# Patient Record
Sex: FEMALE | Race: WHITE | NOT HISPANIC OR LATINO | Employment: FULL TIME | URBAN - METROPOLITAN AREA
[De-identification: names, ages, dates, MRNs, and addresses within clinical notes are randomized per-mention and may not be internally consistent; named-entity substitution may affect disease eponyms.]

---

## 2018-12-03 ENCOUNTER — OFFICE VISIT (OUTPATIENT)
Dept: OBGYN | Facility: CLINIC | Age: 28
End: 2018-12-03
Payer: COMMERCIAL

## 2018-12-03 VITALS
DIASTOLIC BLOOD PRESSURE: 90 MMHG | HEIGHT: 64 IN | BODY MASS INDEX: 29.53 KG/M2 | TEMPERATURE: 98.4 F | SYSTOLIC BLOOD PRESSURE: 130 MMHG | WEIGHT: 173 LBS | HEART RATE: 101 BPM

## 2018-12-03 DIAGNOSIS — R03.0 ELEVATED BLOOD PRESSURE READING WITHOUT DIAGNOSIS OF HYPERTENSION: ICD-10-CM

## 2018-12-03 DIAGNOSIS — Z01.419 ENCOUNTER FOR GYNECOLOGICAL EXAMINATION WITHOUT ABNORMAL FINDING: Primary | ICD-10-CM

## 2018-12-03 DIAGNOSIS — Z12.4 SCREENING FOR MALIGNANT NEOPLASM OF CERVIX: ICD-10-CM

## 2018-12-03 PROCEDURE — 87491 CHLMYD TRACH DNA AMP PROBE: CPT | Performed by: OBSTETRICS & GYNECOLOGY

## 2018-12-03 PROCEDURE — G0145 SCR C/V CYTO,THINLAYER,RESCR: HCPCS | Performed by: OBSTETRICS & GYNECOLOGY

## 2018-12-03 PROCEDURE — 87591 N.GONORRHOEAE DNA AMP PROB: CPT | Performed by: OBSTETRICS & GYNECOLOGY

## 2018-12-03 PROCEDURE — 99385 PREV VISIT NEW AGE 18-39: CPT | Performed by: OBSTETRICS & GYNECOLOGY

## 2018-12-03 NOTE — PROGRESS NOTES
OB GYN CLINIC VISIT  12/3/2018  CC: annual exam    HPI:  Chanel is a 28 year old  female who presents for annual exam.     Menses are regular q 28-30 days and normal lasting 4 days.  Menses flow: normal and light.  Patient's last menstrual period was 2018.. Using condoms/withdrawal for contraception. Would be okay with pregnancy at this time. She is currently considering pregnancy. Plans to start trying in summer.   Besides routine health maintenance, she has no other health concerns today.  She and her  are trying to lose weight.  Started meal planning and have a plan to increase activity and exercise.    Menses regular. Had IUD 0074-5217. Tried OCPs.     GYNECOLOGIC HISTORY:  Menarche: 16  Age at first intercourse: 18 Number of lifetime partners: more than 6  Chanel is sexually active with 1male partner(s) and is currently in monogamous relationship with .    History sexually transmitted infections:No STD history  STI testing offered?  Accepted  JOSE JUAN exposure: No  History of abnormal Pap smear: NO - age 21-29 PAP every 3 years recommended  Family history of breast CA: No  Family history of uterine/ovarian CA: No  Family history of colon CA: No    HEALTH MAINTENANCE:  Cholesterol: (No results found for: CHOL History of abnormal lipids: No  Mammo: 0 . History of abnormal Mammo: Not applicable, 0.  Regular Self Breast Exams: No  Calcium/Vitamin D intake: source:   Adequate? Yes  TSH: (No results found for: TSH )  Pap; (No results found for: PAP )    HISTORY:  Obstetric History       T0      L0     SAB0   TAB0   Ectopic0   Multiple0   Live Births0         No past medical history on file.  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  Social History     Marital status:      Spouse name: N/A     Number of children: N/A     Years of education: N/A     Social History Main Topics     Smoking status: Former Smoker     Types: Cigarettes     Smokeless tobacco: Never Used     Alcohol use Yes     Drug use: Yes     Sexual activity: Yes     Partners: Male     Other Topics Concern     None     Social History Narrative     None     No current outpatient prescriptions on file.   No Known Allergies    Past medical, surgical, social and family history were reviewed and updated in EPIC.    ROS:   C:     NEGATIVE for fever, chills, change in weight  I:       NEGATIVE for worrisome rashes, moles or lesions  E:     NEGATIVE for vision changes or irritation  E/M: NEGATIVE for ear, mouth and throat problems  R:     NEGATIVE for significant cough or SOB  CV:   NEGATIVE for chest pain, palpitations or peripheral edema  GI:     NEGATIVE for nausea, abdominal pain, heartburn, or change in bowel habits  :   NEGATIVE for frequency, dysuria, hematuria, vaginal discharge, or irregular bleeding  M:     NEGATIVE for significant arthralgias or myalgia  N:      NEGATIVE for weakness, dizziness or paresthesias  E:      NEGATIVE for temperature intolerance, skin/hair changes  P:      NEGATIVE for changes in mood or affect.    EXAM:  /90 (BP Location: Left arm, Patient Position: Sitting, Cuff Size: Adult Regular)  Pulse 101  Temp 98.4  F (36.9  C) (Oral)  Ht 5' 4\" (1.626 m)  Wt 173 lb (78.5 kg)  LMP 11/12/2018  BMI 29.7 kg/m2   BMI: Body mass index is 29.7 kg/(m^2).  Constitutional: healthy, alert and no distress  Head: Normocephalic. No masses, lesions, tenderness or abnormalities  Neck: Neck supple. Trachea midline. " No adenopathy. Thyroid symmetric, normal size.   Cardiovascular: RRR.   Respiratory: Negative.   Breast: No nodularity, asymmetry or nipple discharge bilaterally.  Gastrointestinal: Abdomen soft, non-tender, non-distended. No masses, organomegaly.  :  Vulva:  No external lesions, normal female hair distribution, no inguinal adenopathy.    Urethra:  Midline, non-tender, well supported, no discharge  Vagina:  Moist, pink, no abnormal discharge, no lesions  Uterus:  Normal size, non-tender, freely mobile  Ovaries:  No masses appreciated, non-tender, mobile  Rectal Exam: deferred  Musculoskeletal: extremities normal  Skin: raised 5mm dark mole on top of left foot, no suspicious lesions or rashes  Psychiatric: Affect appropriate, cooperative,mentation appears normal.     COUNSELING:   Reviewed preventive health counseling, as reflected in patient instructions       Regular exercise       Healthy diet/nutrition       Contraception       Family planning       Safe sex practices/STD prevention   reports that she has quit smoking. Her smoking use included Cigarettes. She has never used smokeless tobacco.    Body mass index is 29.7 kg/(m^2).  Weight management plan: Discussed healthy diet and exercise guidelines  FRAX Risk Assessment    ASSESSMENT:  28 year old female with satisfactory annual exam    1. Encounter for gynecological examination without abnormal finding  Recommend patient start taking prenatal vitamins prior to trying to conceive  - Pap imaged thin layer screen reflex to HPV if ASCUS - recommend age 25 - 29  - NEISSERIA GONORRHOEA PCR  - CHLAMYDIA TRACHOMATIS PCR    2. Screening for malignant neoplasm of cervix  - Pap imaged thin layer screen reflex to HPV if ASCUS - recommend age 25 - 29    3. Elevated blood pressure reading without diagnosis of hypertension  Patient plans to work on diet and increase exercise.  She will check her blood pressure at work.      Brenda Hooks MD

## 2018-12-03 NOTE — MR AVS SNAPSHOT
"              After Visit Summary   12/3/2018    Chanel Vázquez    MRN: [id number]           Patient Information     Date Of Birth          1990        Visit Information        Provider Department      12/3/2018 12:30 PM Brenda Hooks MD [facility] [place]        Today's Diagnoses     Encounter for gynecological examination without abnormal finding    -  1    Screening for malignant neoplasm of cervix        Elevated blood pressure reading without diagnosis of hypertension           Follow-ups after your visit        Who to contact     If you have questions or need follow up information about today's clinic visit or your schedule please contact [facility] [place] at [phone].  Normal or non-critical lab and imaging results will be communicated to you by [employer], letter or phone within 4 business days after the clinic has received the results. If you do not hear from us within 7 days, please contact the clinic through [employer] or phone. If you have a critical or abnormal lab result, we will notify you by phone as soon as possible.  Submit refill requests through [employer] or call your pharmacy and they will forward the refill request to us. Please allow 3 business days for your refill to be completed.          Additional Information About Your Visit        MyChart Information     [employer] lets you send messages to your doctor, view your test results, renew your prescriptions, schedule appointments and more. To sign up, go to [email or web address] . Click on \"Log in\" on the left side of the screen, which will take you to the Welcome page. Then click on \"Sign up Now\" on the right side of the page.     You will be asked to enter the access code listed below, as well as some personal information. Please follow the directions to create your username and password.     Your access code is: [email or web address]  Expires: 3/3/2019  2:27 PM     Your access code will  in 90 days. If you " "need help or a new code, please call your [facility] clinic or [phone].        Care EveryWhere ID     This is your Care EveryWhere ID. This could be used by other organizations to access your [facility] medical records  [id number]        Your Vitals Were     Pulse Temperature Height Last Period BMI (Body Mass Index)       101 98.4  F (36.9  C) (Oral) 5' 4\" (1.626 m) 11/12/2018 29.7 kg/m2        Blood Pressure from Last 3 Encounters:   12/03/18 130/90    Weight from Last 3 Encounters:   12/03/18 173 lb (78.5 kg)   07/29/02 78 lb (35.4 kg) (56 %)*     * Growth percentiles are based on [facility] 2-20 Years data.              We Performed the Following     CHLAMYDIA TRACHOMATIS PCR     NEISSERIA GONORRHOEA PCR     Pap imaged thin layer screen reflex to HPV if ASCUS - recommend age 25 - 29        Primary Care Provider Fax #    Physician No Ref-Primary [phone]       No address on file        Equal Access to Services     ELIJAH MEREDITH : Hadii aad ku hadasho Soomaali, waaxda luqadaha, qaybta kaalmada adeegyada, hanny gaviria . So [facility] [phone].    ATENCIÓN: Si habla español, tiene a longoria disposición servicios gratuitos de asistencia lingüística. Llame al [phone].    We comply with applicable federal civil rights laws and [place] laws. We do not discriminate on the basis of race, color, national origin, age, disability, sex, sexual orientation, or gender identity.            Thank you!     Thank you for choosing [facility]  [place] your [place]. Our goal is always to provide you with excellent care. Hearing back from our patients is one way we can continue to improve our services. Please take a few minutes to complete the written survey that you may receive in the mail after your visit with us. Thank you!             Your Updated Medication List - Protect others around you: Learn how to safely use, store and throw away your medicines at www.disposemymeds.org.      Notice  As of " 12/3/2018  2:27 PM    You have not been prescribed any medications.

## 2018-12-03 NOTE — LETTER
December 6, 2018      Chanel Vázquez  [address] [place] [address]        Dear ,    We are writing to inform you of your test results.    Your test results fall within the expected range(s) or remain unchanged from previous results.  Please continue with current treatment plan.    Resulted Orders   Pap imaged thin layer screen reflex to HPV if ASCUS - recommend age 25 - 29   Result Value Ref Range    PAP NIL     Copath Report         Patient Name: CHANEL VÁZQUEZ  MR#: [id number]  Specimen #: [id number]  Collected: 12/3/2018  Received: 12/4/2018  Reported: 12/5/2018 10:32  Ordering Phy(s): NANY SMITH    For improved result formatting, select 'View Enhanced Report Format' under   Linked Documents section.    SPECIMEN/STAIN PROCESS:  Pap imaged thin layer prep screening (Surepath, FocalPoint with guided   screening)       Pap-Cyto x 1, Pap with reflex to HPV if ASCUS x 1    SOURCE: Cervical, endocervical  ----------------------------------------------------------------   Pap imaged thin layer prep screening (Surepath, FocalPoint with guided   screening)  SPECIMEN ADEQUACY:  Satisfactory for evaluation.  -Transformation zone component present.    CYTOLOGIC INTERPRETATION:    Negative for intraepithelial lesion or malignancy    Electronically signed out by:  SHIRA Luu  (ASCP)    Processed and screened at [facility]    CLINICAL HISTORY:  LMP : 11/12/2018    Papanicolaou Test Limitations:  Cervical cytology is a screening test with   limited sensitivity; regular  screening is critical for cancer prevention; Pap tests are primarily   effective for the diagnosis/prevention of  squamous cell carcinoma, not adenocarcinomas or other cancers.    TESTING LAB LOCATION:  [address]  [phone]    COLLECTION SITE:  Client:  [facility],  [place]  Location: [facility] (B)     NEISSERIA GONORRHOEA PCR   Result Value Ref Range    Specimen Descrip Cervix     N Gonorrhea PCR Negative NEG^Negative      Comment:      Negative for N. gonorrhoeae rRNA by transcription mediated amplification.  A negative result by transcription mediated amplification does not preclude   the presence of N. gonorrhoeae infection because results are dependent on   proper and adequate collection, absence of inhibitors, and sufficient rRNA to   be detected.     CHLAMYDIA TRACHOMATIS PCR   Result Value Ref Range    Specimen Description Cervix     Chlamydia Trachomatis PCR Negative NEG^Negative      Comment:      Negative for C. trachomatis rRNA by transcription mediated amplification.  A negative result by transcription mediated amplification does not preclude   the presence of C. trachomatis infection because results are dependent on   proper and adequate collection, absence of inhibitors, and sufficient rRNA to   be detected.         If you have any questions or concerns, please call the clinic at the number listed above.       Sincerely,        Brenda Hooks MD

## 2018-12-03 NOTE — LETTER
December 6, 2018      Chanel Gurpreet  [address] [place] [address]    Dear ,      I am happy to inform you that your recent cervical cancer screening test (PAP smear) was normal.      Preventative screenings such as this help to ensure your health for years to come. You should repeat a pap smear in 3 years, unless otherwise directed.      You will still need to return to the clinic every year for your annual exam and other preventive tests.     If you have additional questions regarding this result, please call our registered nurse, Belén at [phone].      Sincerely,      Brenda Hooks MD/[facility]

## 2018-12-05 LAB
C TRACH DNA SPEC QL NAA+PROBE: NEGATIVE
COPATH REPORT: NORMAL
N GONORRHOEA DNA SPEC QL NAA+PROBE: NEGATIVE
PAP: NORMAL
SPECIMEN SOURCE: NORMAL
SPECIMEN SOURCE: NORMAL

## 2019-01-22 ENCOUNTER — OFFICE VISIT (OUTPATIENT)
Dept: OPTOMETRY | Facility: CLINIC | Age: 29
End: 2019-01-22
Payer: COMMERCIAL

## 2019-01-22 DIAGNOSIS — H52.223 REGULAR ASTIGMATISM OF BOTH EYES: Primary | ICD-10-CM

## 2019-01-22 PROCEDURE — 92004 COMPRE OPH EXAM NEW PT 1/>: CPT | Performed by: OPTOMETRIST

## 2019-01-22 PROCEDURE — 92015 DETERMINE REFRACTIVE STATE: CPT | Performed by: OPTOMETRIST

## 2019-01-22 RX ORDER — MULTIVITAMIN
1 TABLET ORAL DAILY
COMMUNITY
End: 2019-10-31

## 2019-01-22 ASSESSMENT — EXTERNAL EXAM - LEFT EYE: OS_EXAM: NORMAL

## 2019-01-22 ASSESSMENT — VISUAL ACUITY
METHOD: SNELLEN - LINEAR
OS_SC: 20/20
OD_SC+: -1
OD_SC: 20/30
OS_SC+: -1
OS_SC: 20/30
OD_SC: 20/20

## 2019-01-22 ASSESSMENT — CONF VISUAL FIELD
OD_NORMAL: 1
METHOD: COUNTING FINGERS
OS_NORMAL: 1

## 2019-01-22 ASSESSMENT — REFRACTION_MANIFEST
OD_CYLINDER: +0.50
OS_SPHERE: -1.00
OD_SPHERE: -1.00
OD_SPHERE: -0.75
OS_CYLINDER: +0.75
OS_CYLINDER: +0.50
METHOD_AUTOREFRACTION: 1
OD_CYLINDER: +0.50
OS_AXIS: 015
OD_AXIS: 153
OS_SPHERE: -1.00
OS_AXIS: 1
OD_AXIS: 155

## 2019-01-22 ASSESSMENT — KERATOMETRY
OS_AXISANGLE2_DEGREES: 179
OD_AXISANGLE2_DEGREES: 161
OD_K2POWER_DIOPTERS: 41.75
OS_K2POWER_DIOPTERS: 41.50
OS_K1POWER_DIOPTERS: 41.75
OD_K1POWER_DIOPTERS: 42.00

## 2019-01-22 ASSESSMENT — EXTERNAL EXAM - RIGHT EYE: OD_EXAM: NORMAL

## 2019-01-22 ASSESSMENT — REFRACTION_WEARINGRX
OD_SPHERE: -0.75
OS_SPHERE: -1.00
OS_AXIS: 007
OD_CYLINDER: +0.50
OD_AXIS: 180
OS_CYLINDER: +0.75
SPECS_TYPE: SVL

## 2019-01-22 ASSESSMENT — CUP TO DISC RATIO
OD_RATIO: 0.3
OS_RATIO: 0.3

## 2019-01-22 ASSESSMENT — SLIT LAMP EXAM - LIDS
COMMENTS: NORMAL
COMMENTS: NORMAL

## 2019-01-22 ASSESSMENT — TONOMETRY
OD_IOP_MMHG: 18
IOP_METHOD: APPLANATION
OS_IOP_MMHG: 18

## 2019-01-22 NOTE — PROGRESS NOTES
Chief Complaint   Patient presents with     Annual Eye Exam    New to Eye Dept       Last Eye Exam: At least 3 years ago  Dilated Previously: Yes    What are you currently using to see?  Glasses, wears randomly for driving and distance tasks        Distance Vision Acuity: Noticed gradual change in both eyes    Near Vision Acuity: Satisfied with vision while reading and using computer unaided    Eye Comfort: good  Do you use eye drops? 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       OBJECTIVE: See Ophthalmology exam    ASSESSMENT:    ICD-10-CM    1. Regular astigmatism of both eyes H52.223 REFRACTION     EYE EXAM (SIMPLE-NONBILLABLE)      PLAN:     Patient Instructions   Patient was advised of today's exam findings.  Optional to fill new glasses prescription, minimal change  Return in 1-2 years for eye exam    Sherly Childs O.D.  [facility]   [address] [place], [place] [id number] [phone]

## 2019-01-22 NOTE — PATIENT INSTRUCTIONS
Patient was advised of today's exam findings.  Optional to fill new glasses prescription, minimal change  Return in 1-2 years for eye exam    Sherly Childs O.D.  [facility]   [address] [place] [address] [phone]

## 2019-07-24 ENCOUNTER — OFFICE VISIT (OUTPATIENT)
Dept: OBGYN | Facility: CLINIC | Age: 29
End: 2019-07-24
Payer: COMMERCIAL

## 2019-07-24 VITALS
WEIGHT: 174 LBS | BODY MASS INDEX: 29.87 KG/M2 | SYSTOLIC BLOOD PRESSURE: 122 MMHG | TEMPERATURE: 97.9 F | DIASTOLIC BLOOD PRESSURE: 83 MMHG | HEART RATE: 64 BPM

## 2019-07-24 DIAGNOSIS — Z01.419 ENCOUNTER FOR GYNECOLOGICAL EXAMINATION WITHOUT ABNORMAL FINDING: Primary | ICD-10-CM

## 2019-07-24 DIAGNOSIS — Z31.69 ENCOUNTER FOR PRECONCEPTION CONSULTATION: ICD-10-CM

## 2019-07-24 PROCEDURE — 36415 COLL VENOUS BLD VENIPUNCTURE: CPT | Performed by: OBSTETRICS & GYNECOLOGY

## 2019-07-24 PROCEDURE — 99395 PREV VISIT EST AGE 18-39: CPT | Performed by: OBSTETRICS & GYNECOLOGY

## 2019-07-24 PROCEDURE — 86706 HEP B SURFACE ANTIBODY: CPT | Performed by: OBSTETRICS & GYNECOLOGY

## 2019-07-24 PROCEDURE — 86765 RUBEOLA ANTIBODY: CPT | Performed by: OBSTETRICS & GYNECOLOGY

## 2019-07-24 PROCEDURE — 86762 RUBELLA ANTIBODY: CPT | Performed by: OBSTETRICS & GYNECOLOGY

## 2019-07-24 NOTE — PROGRESS NOTES
GYN CLINIC VISIT  2019   CC: annual exam    Chanel is a 29 year old  female who presents for annual exam.     Menses are regular q 28-30 days and normal lasting 4 days.  Menses flow: normal and light.  Patient's last menstrual period was 2019.. Using none for contraception.  She is currently considering pregnancy. IUD removed 3+ years ago. Tried OCP for a few months, did not like it. Now planning for pregnancy. States she was found to not have immunity to Hep B, then got the immunization series but never got rechecked. Works in PICU. Wants to get titers for rubella and measles prior to conceiving.   Besides routine health maintenance, she has no other health concerns today .  GYNECOLOGIC HISTORY:  Menarche: 16  Age at first intercourse: 18 Number of lifetime partners:+6  Chanel is sexually active with 1male partner(s) and is currently in monogamous relationship.    History sexually transmitted infections:No STD history  STI testing offered?  Declined  JOSE JUAN exposure: No  History of abnormal Pap smear: NO - age 21-29 PAP every 3 years recommended  Family history of breast CA: No  Family history of uterine/ovarian CA: No    Family history of colon CA: No    HEALTH MAINTENANCE:  Cholesterol: (No results found for: CHOL History of abnormal lipids: No  Mammo: no . History of abnormal Mammo: Not applicable.  Regular Self Breast Exams: No  Calcium/Vitamin D intake: source:  dairy Adequate? Yes  TSH: (No results found for: TSH )  Pap; (  Lab Results   Component Value Date    PAP NIL 2018    )    HISTORY:  OB History    Para Term  AB Living   0 0 0 0 0 0   SAB TAB Ectopic Multiple Live Births   0 0 0 0 0     History reviewed. No pertinent past medical history.  Past Surgical History:   Procedure Laterality Date     AS REPAIR CRUCIATE LIGAMENT,KNEE  2010     ELBOW SURGERY       Family History   Problem Relation Age of Onset     Depression Mother      Anxiety Disorder Mother      Asthma  Father      Arthritis Father      Depression Brother      Anxiety Disorder Brother      Hyperlipidemia Brother      Diabetes Maternal Grandmother      Diabetes Maternal Grandfather      Heart Disease Maternal Grandfather      Diabetes Paternal Grandmother      Cancer Other         Paternal Grandfather     Glaucoma No family hx of      Macular Degeneration No family hx of      Social History   Works as RN in PICU at [employer]  Socioeconomic History     Marital status:      Spouse name: None     Number of children: None     Years of education: None     Highest education level: None   Occupational History     None   Social Needs     Financial resource strain: None     Food insecurity:     Worry: None     Inability: None     Transportation needs:     Medical: None     Non-medical: None   Tobacco Use     Smoking status: Former Smoker     Types: Cigarettes     Smokeless tobacco: Never Used   Substance and Sexual Activity     Alcohol use: Yes     Drug use: Yes     Sexual activity: Yes     Partners: Male   Lifestyle     Physical activity:     Days per week: None     Minutes per session: None     Stress: None   Relationships     Social connections:     Talks on phone: None     Gets together: None     Attends [employer] service: None     Active member of club or organization: None     Attends meetings of clubs or organizations: None     Relationship status: None     Intimate partner violence:     Fear of current or ex partner: None     Emotionally abused: None     Physically abused: None     Forced sexual activity: None   Other Topics Concern     None   Social History Narrative     None       Current Outpatient Medications:      multiple vitamin  tablet TABS, Take 1 tablet by mouth daily, Disp: , Rfl:    No Known Allergies    Past medical, surgical, social and family history were reviewed and updated in EPIC.    ROS:   C:     NEGATIVE for fever, chills, change in weight  I:       NEGATIVE for worrisome rashes, moles or  lesions  E:     NEGATIVE for vision changes or irritation  E/M: NEGATIVE for ear, mouth and throat problems  R:     NEGATIVE for significant cough or SOB  CV:   NEGATIVE for chest pain, palpitations or peripheral edema  GI:     NEGATIVE for nausea, abdominal pain, heartburn, or change in bowel habits  :   NEGATIVE for frequency, dysuria, hematuria, vaginal discharge, or irregular bleeding  M:     NEGATIVE for significant arthralgias or myalgia  N:      NEGATIVE for weakness, dizziness or paresthesias  E:      NEGATIVE for temperature intolerance, skin/hair changes  P:      NEGATIVE for changes in mood or affect.    EXAM:  /83   Pulse 64   Temp 97.9  F (36.6  C) (Oral)   Wt 78.9 kg (174 lb)   LMP 07/21/2019   BMI 29.87 kg/m     BMI: Body mass index is 29.87 kg/m .  Constitutional: healthy, alert and no distress  Head: Normocephalic. No masses, lesions, tenderness or abnormalities  Neck: Neck supple. Trachea midline. No adenopathy. Thyroid symmetric, normal size.   Cardiovascular: RRR.   Respiratory: Negative.   Breast: No nodularity, asymmetry or nipple discharge bilaterally.  Gastrointestinal: Abdomen soft, non-tender, non-distended. No masses, organomegaly.  :  Vulva:  No external lesions, normal female hair distribution, no inguinal adenopathy.    Urethra:  Midline, non-tender, well supported, no discharge  Vagina:  Moist, pink, no abnormal discharge, no lesions  Uterus:  Normal size, anteverted , non-tender, freely mobile  Ovaries:  No masses appreciated, non-tender, mobile  Rectal Exam: deferred  Musculoskeletal: extremities normal  Skin: no suspicious lesions or rashes  Psychiatric: Affect appropriate, cooperative,mentation appears normal.     COUNSELING:   Reviewed preventive health counseling, as reflected in patient instructions       Regular exercise       Healthy diet/nutrition       Family planning   reports that she has quit smoking. Her smoking use included cigarettes. She has never used  smokeless tobacco.    Body mass index is 29.87 kg/m .    FRAX Risk Assessment    ASSESSMENT:  29 year old female with satisfactory annual exam  1. Encounter for gynecological examination without abnormal finding  Last pap NIL on 12/3/18. Due for repeat in 2021.    2. Encounter for preconception consultation  Discussed fertile window and timing of intercourse. Patient already tracking her cycle. Recommend she start PNV prior to conceiving. Recommend she avoid alcohol and travel to [place] areas.   - Hepatitis B Surface Antibody  - Rubella Antibody IgG Quantitative  - Rubeola Antibody IgG    Brenda Hooks MD

## 2019-07-25 LAB
HBV SURFACE AB SERPL IA-ACNC: 21.54 M[IU]/ML
MEV IGG SER QL IA: 1.7 AI (ref 0–0.8)
RUBV IGG SERPL IA-ACNC: 11 IU/ML

## 2019-10-07 ENCOUNTER — PRENATAL OFFICE VISIT (OUTPATIENT)
Dept: NURSING | Facility: CLINIC | Age: 29
End: 2019-10-07
Payer: COMMERCIAL

## 2019-10-07 VITALS
SYSTOLIC BLOOD PRESSURE: 114 MMHG | TEMPERATURE: 98 F | HEART RATE: 73 BPM | BODY MASS INDEX: 28.85 KG/M2 | HEIGHT: 64 IN | WEIGHT: 169 LBS | DIASTOLIC BLOOD PRESSURE: 80 MMHG

## 2019-10-07 DIAGNOSIS — Z23 NEED FOR TDAP VACCINATION: ICD-10-CM

## 2019-10-07 DIAGNOSIS — Z34.00 SUPERVISION OF NORMAL FIRST PREGNANCY: Primary | ICD-10-CM

## 2019-10-07 LAB
ABO + RH BLD: NORMAL
ABO + RH BLD: NORMAL
ALBUMIN UR-MCNC: NEGATIVE MG/DL
APPEARANCE UR: CLEAR
BILIRUB UR QL STRIP: NEGATIVE
BLD GP AB SCN SERPL QL: NORMAL
BLOOD BANK CMNT PATIENT-IMP: NORMAL
COLOR UR AUTO: YELLOW
ERYTHROCYTE [DISTWIDTH] IN BLOOD BY AUTOMATED COUNT: 11.9 % (ref 10–15)
GLUCOSE UR STRIP-MCNC: NEGATIVE MG/DL
HBV SURFACE AG SERPL QL IA: NONREACTIVE
HCG UR QL: POSITIVE
HCT VFR BLD AUTO: 39.4 % (ref 35–47)
HGB BLD-MCNC: 13.5 G/DL (ref 11.7–15.7)
HGB UR QL STRIP: NEGATIVE
HIV 1+2 AB+HIV1 P24 AG SERPL QL IA: NONREACTIVE
KETONES UR STRIP-MCNC: NEGATIVE MG/DL
LEUKOCYTE ESTERASE UR QL STRIP: ABNORMAL
MCH RBC QN AUTO: 31.6 PG (ref 26.5–33)
MCHC RBC AUTO-ENTMCNC: 34.3 G/DL (ref 31.5–36.5)
MCV RBC AUTO: 92 FL (ref 78–100)
NITRATE UR QL: NEGATIVE
PH UR STRIP: 6.5 PH (ref 5–7)
PLATELET # BLD AUTO: 171 10E9/L (ref 150–450)
RBC # BLD AUTO: 4.27 10E12/L (ref 3.8–5.2)
SOURCE: ABNORMAL
SP GR UR STRIP: <=1.005 (ref 1–1.03)
SPECIMEN EXP DATE BLD: NORMAL
UROBILINOGEN UR STRIP-ACNC: 0.2 EU/DL (ref 0.2–1)
WBC # BLD AUTO: 6.8 10E9/L (ref 4–11)

## 2019-10-07 PROCEDURE — 36415 COLL VENOUS BLD VENIPUNCTURE: CPT | Performed by: ADVANCED PRACTICE MIDWIFE

## 2019-10-07 PROCEDURE — 86900 BLOOD TYPING SEROLOGIC ABO: CPT | Performed by: ADVANCED PRACTICE MIDWIFE

## 2019-10-07 PROCEDURE — 81025 URINE PREGNANCY TEST: CPT | Performed by: ADVANCED PRACTICE MIDWIFE

## 2019-10-07 PROCEDURE — 85027 COMPLETE CBC AUTOMATED: CPT | Performed by: ADVANCED PRACTICE MIDWIFE

## 2019-10-07 PROCEDURE — 99207 ZZC NO CHARGE NURSE ONLY: CPT

## 2019-10-07 PROCEDURE — 86780 TREPONEMA PALLIDUM: CPT | Performed by: ADVANCED PRACTICE MIDWIFE

## 2019-10-07 PROCEDURE — 87389 HIV-1 AG W/HIV-1&-2 AB AG IA: CPT | Performed by: ADVANCED PRACTICE MIDWIFE

## 2019-10-07 PROCEDURE — 86901 BLOOD TYPING SEROLOGIC RH(D): CPT | Performed by: ADVANCED PRACTICE MIDWIFE

## 2019-10-07 PROCEDURE — 87086 URINE CULTURE/COLONY COUNT: CPT | Performed by: ADVANCED PRACTICE MIDWIFE

## 2019-10-07 PROCEDURE — 81003 URINALYSIS AUTO W/O SCOPE: CPT | Performed by: ADVANCED PRACTICE MIDWIFE

## 2019-10-07 PROCEDURE — 87340 HEPATITIS B SURFACE AG IA: CPT | Performed by: ADVANCED PRACTICE MIDWIFE

## 2019-10-07 PROCEDURE — 86762 RUBELLA ANTIBODY: CPT | Performed by: ADVANCED PRACTICE MIDWIFE

## 2019-10-07 PROCEDURE — 86850 RBC ANTIBODY SCREEN: CPT | Performed by: ADVANCED PRACTICE MIDWIFE

## 2019-10-07 SDOH — SOCIAL STABILITY: SOCIAL INSECURITY: WITHIN THE LAST YEAR, HAVE YOU BEEN AFRAID OF YOUR PARTNER OR EX-PARTNER?: NO

## 2019-10-07 SDOH — SOCIAL STABILITY: SOCIAL INSECURITY
WITHIN THE LAST YEAR, HAVE YOU BEEN KICKED, HIT, SLAPPED, OR OTHERWISE PHYSICALLY HURT BY YOUR PARTNER OR EX-PARTNER?: NO

## 2019-10-07 SDOH — SOCIAL STABILITY: SOCIAL INSECURITY
WITHIN THE LAST YEAR, HAVE TO BEEN RAPED OR FORCED TO HAVE ANY KIND OF SEXUAL ACTIVITY BY YOUR PARTNER OR EX-PARTNER?: NO

## 2019-10-07 SDOH — HEALTH STABILITY: PHYSICAL HEALTH: ON AVERAGE, HOW MANY DAYS PER WEEK DO YOU ENGAGE IN MODERATE TO STRENUOUS EXERCISE (LIKE A BRISK WALK)?: 6 DAYS

## 2019-10-07 SDOH — SOCIAL STABILITY: SOCIAL INSECURITY: WITHIN THE LAST YEAR, HAVE YOU BEEN HUMILIATED OR EMOTIONALLY ABUSED IN OTHER WAYS BY YOUR PARTNER OR EX-PARTNER?: NO

## 2019-10-07 SDOH — SOCIAL STABILITY: SOCIAL NETWORK: ARE YOU MARRIED, WIDOWED, DIVORCED, SEPARATED, NEVER MARRIED, OR LIVING WITH A PARTNER?: MARRIED

## 2019-10-07 SDOH — HEALTH STABILITY: MENTAL HEALTH
STRESS IS WHEN SOMEONE FEELS TENSE, NERVOUS, ANXIOUS, OR CAN'T SLEEP AT NIGHT BECAUSE THEIR MIND IS TROUBLED. HOW STRESSED ARE YOU?: NOT AT ALL

## 2019-10-07 SDOH — HEALTH STABILITY: PHYSICAL HEALTH: ON AVERAGE, HOW MANY MINUTES DO YOU ENGAGE IN EXERCISE AT THIS LEVEL?: 60 MIN

## 2019-10-07 ASSESSMENT — MIFFLIN-ST. JEOR: SCORE: 1486.58

## 2019-10-07 NOTE — PROGRESS NOTES
Important Information for Provider:     Patient presents for new ob teaching and labs, first pregnancy. Denies any problems at this time. Declined first trimester screening. Handouts reviewed and given. Has NOB with CNM 10/31/19    Caffeine intake/servings daily - 0  Calcium intake/servings daily - 3  Exercise 5 times weekly - describe ; cardio, precautions given  Sunscreen used - Yes  Seatbelts used - Yes  Guns stored in the home - No  Self Breast Exam - Yes  Pap test up to date -  Yes  Eye exam up to date -  Yes  Dental exam up to date -  Yes  Immunizations reviewed and up to date - Yes  Abuse: Current or Past (Physical, Sexual or Emotional) - No  Do you feel safe in your environment - Yes  Do you cope well with stress - Yes  Do you suffer from insomnia - No        Prenatal OB Questionnaire  Patient supplied answers from flow sheet for:  Prenatal OB Questionnaire.  Past Medical History  Diabetes?: No  Hypertension : No  Heart disease, mitral valve prolapse or rheumatic fever?: No  An autoimmune disease such as lupus or rheumatoid arthritis?: No  Kidney disease or urinary tract infection?: No  Epilepsy, seizures or spells?: No  Migraine headaches?: No  A stroke or loss of function or sensation?: No  Any other neurological problems?: No  Have you ever been treated for depression?: No  Are you having problems with crying spells or loss of self-esteem?: No  Have you ever required psychiatric care?: No  Have you ever had hepatitis, liver disease or jaundice?: No  Have you been treated for blood clots in your veins, deep vein thromosis, inflammation in the veins, thrombosis, phlebitis, pulmonary embolism or varicosities?: No  Have you had excessive bleeding after surgery or dental work?: No  Do you bleed more than other women after a cut or scratch?: No  Do you have a history of anemia?: No  Have you ever had thyroid problems or taken thyroid medication?: No   Do you have any endocrine problems?: No  Have you ever been  in a major accident or suffered serious trauma?: No  Within the last year, has anyone hit, slapped, kicked or otherwise hurt you?: No  In the last year, has anyone forced you to have sex when you didn't want to?: No    Past Medical History 2   Have you ever received a blood transfusion?: No  Would you refuse a blood transfusion if a doctor judged it to be medically necessary?: No   If you answered Yes, would you rather die than receive a blood transfusion?: No  If you answered Yes, is this for Roman Catholic reasons?: No  Does anyone in your home smoke?: No  Do you use tobacco products?: No  Do you drink beer, wine or hard liquor?: No  Do you use any of the following: marijuana, speed, cocaine, heroin, hallucinogens or other drugs?: No   Is your blood type Rh negative?: No  Have you ever had abnormal antibodies in your blood?: No  Have you ever had asthma?: No  Have you ever had tuberculosis?: No  Do you have any allergies to drugs or over-the-counter medications?: No  Allergies: Dust Mites, Aspartame, Ethanol, Venlafaxine, Hydrochloride, Sertraline: No  Have you had any breast problems?: No  Have you ever ?: No  Have you had any gynecological surgical procedures such as cervical conization, a LEEP procedure, laser treatment, cryosurgery of the cervix or a dilation and curettage, etc?: No  Have you ever had any other surgical procedures?: No  Have you been hospitalized for a nonsurgical reason excluding normal delivery?: No  Have you ever had any anesthetic complications?: No  Have you ever had an abnormal pap smear?: No    Past Medical History (Continued)  Do you have a history of abnormalities of the uterus?: No  Did your mother take JOSE JUAN or any other hormones when she was pregnant with you?: No  Did it take you more than a year to become pregnant?: No  Have you ever been evaluated or treated for infertility?: No  Is there a history of medical problems in your family, which you feel may be important to this  pregnancy?: No  Do you have any other problems we have not asked about which you feel may be important to this pregnancy?: No    Symptoms since last menstrual period  Do you have any of the following symptoms: abdominal pain, blood in stools or urine, chest pain, shortness of breath, coughing or vomiting up blood, your heart racing or skipping beats, nausea and vomiting, pain on urination or vaginal discharge or bleed: (!) Yes, fatigue  Will the patient be 35 years old or older at the time of delivery?: No    Has the patient, baby's father or anyone in either family had:  Thalassemia (Italian, Greek, Mediterranean or  background only) and an MCV result less than 80?: No  Neural tube defect such as meningomyelocele, spina bifida or anencephaly?: anencephaly ' second paternal cousin  Congenital heart defect?: No  Down's Syndrome?: No  Galdino-Sachs disease (Episcopalian, Cajun, Wallisian-Vincentian)?: No  Sickle cell disease or trait ()?: No  Hemophilia or other inherited problems of blood?: No  Muscular dystrophy?: No  Cystic fibrosis?: No  Glascock's chorea?: No  Mental retardation/autism?: 's paternal cousin has autism  If yes, was the person tested for fragile X?: No  Any other inherited genetic or chromosomal disorder?: No  Maternal metabolic disorder (e.g Insulin-dependent diabetes, PKU)?: No  A child with birth defects not listed above?: No  Recurrent pregnancy loss or stillbirth?: No   Has the patient had any medications/street drugs/alcohol since her last menstrual period?: No  Does the patient or baby's father have any other genetic risks?: No    Infection History   Do you object to being tested for Hepatitis B?: No  Do you object to being tested for HIV?: No   Do you feel that you are at high risk for coming in contact with the AIDS virus?: No  Have you ever been treated for tuberculosis?: No  Have you ever had a positive skin test for tuberculosis?: No  Do you live with someone who has  tuberculosis?: No  Have you ever been exposed to tuberculosis?: No  Do you have genital herpes?: No  Does your partner have genital herpes?: No  Have you had a viral illness since your last period?: No  Do you know if you are a genital group B streptococcus carrier?: No  Have you had chicken pox/varicella?: No   Have you been vaccinated against chicken Pox?: (!) Yes  Have you had any other infectious diseases?: No      Allergies as of 10/7/2019:    Allergies as of 10/07/2019     (No Known Allergies)       Current medications are:  Current Outpatient Medications   Medication Sig Dispense Refill     multiple vitamin  tablet TABS Take 1 tablet by mouth daily           Early ultrasound screening tool:    Does patient have irregular periods?  No  Did patient use hormonal birth control in the three months prior to positive urine pregnancy test? No  Is the patient breastfeeding?  No  Is the patient 10 weeks or greater at time of education visit?  No

## 2019-10-08 LAB
BACTERIA SPEC CULT: NORMAL
RUBV IGG SERPL IA-ACNC: 10 IU/ML
SPECIMEN SOURCE: NORMAL
T PALLIDUM AB SER QL: NONREACTIVE

## 2019-10-31 ENCOUNTER — PRENATAL OFFICE VISIT (OUTPATIENT)
Dept: MIDWIFE SERVICES | Facility: CLINIC | Age: 29
End: 2019-10-31
Payer: COMMERCIAL

## 2019-10-31 VITALS
WEIGHT: 167.4 LBS | HEART RATE: 93 BPM | TEMPERATURE: 97.1 F | SYSTOLIC BLOOD PRESSURE: 120 MMHG | BODY MASS INDEX: 28.73 KG/M2 | DIASTOLIC BLOOD PRESSURE: 80 MMHG

## 2019-10-31 DIAGNOSIS — Z34.01 ENCOUNTER FOR SUPERVISION OF LOW-RISK FIRST PREGNANCY IN FIRST TRIMESTER: Primary | ICD-10-CM

## 2019-10-31 DIAGNOSIS — Z23 NEED FOR TDAP VACCINATION: ICD-10-CM

## 2019-10-31 PROCEDURE — 99207 ZZC FIRST OB VISIT: CPT | Performed by: ADVANCED PRACTICE MIDWIFE

## 2019-10-31 RX ORDER — PRENATAL VIT/IRON FUM/FOLIC AC 27MG-0.8MG
1 TABLET ORAL DAILY
COMMUNITY
End: 2023-07-14

## 2019-10-31 NOTE — PROGRESS NOTES
Chanel Vázquez is a NOB here with  Valentino.  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   One episode of bleeding last week and some decrease in appetite noted.  No emesis.  Discussed prevention of constipation in 1st trimester.  Taking gummie PNV so will add Fe in 2nd trimester if continue on gunnies.  Discussed genetic testing options and will consider but leaning to US only at 18-20 weeks.    Bedside US for date/viability as FHT not auscultated with doppler.  CRL + 10 4/7 consistant with LMP.  Labs reviewed and WNL.   ASSESSMENT: 10w2d   PLAN: RTC in 5 wks for QUAD screen and PNV.     TAWANA Vázquez is a 29 year old   who is not a previous CNM patient.  She presents for a new OB Visit.         Patient's last menstrual period was 08/20/2019..  Estimated Date of Delivery: May 26, 2020.  Estimated Date of Delivery: May 26, 2020  correspond with Patient's last menstrual period was 08/20/2019.  She has had bleeding since her LMP.  The bleeding  was bright red, in small, on 1 occasions, and was not accompanied by cramping...  This was a planned pregnancy.   FOB is Valentino who is in good health.  FOB IS actively involved in relationship with Chanel Vázquez and this pregnancy.        Current medications are:    Current Outpatient Medications:      Prenatal Vit-Fe Fumarate-FA (PRENATAL MULTIVITAMIN W/IRON) 27-0.8 MG tablet, Take 1 tablet by mouth daily, Disp: , Rfl:      =========================================    INFECTION HISTORY  HIV: no  Hepatitis B: no  Hepatitis C: no  Tuberculosis: no  Last PPD   Herpes self: no  Herpes partner:  no  Chlamydia:  no  Gonorrhea:  no  HPV: no  BV:  no  Trichomoniasis:  no  Syphilis:  no  Chicken Pox:  yes  ============================================    PERSONAL/SOCIAL HISTORY  Lives lives with their spouse.  Exercise Habits:  aerobic and walking 4-7 days per week.  Employment: Full time.  Her job  involves moderate activity with moderate potential for toxic exposure.  Travel plans are none planned. Additional items: None  =============================================    REVIEW OF SYSTEMS  CONSTITUTIONAL:  She denies fever, chills and viral infections since her last LMP.  There is mild fatigue.  Weigh loss has not occurred..  DIET: Appetite is increase.  Eats a Regular diet.    Recommend to gain 25 pounds with her pregnancy.  SKIN: Denies changes and suspicious moles or lesions.  HEAD: No headache since LMP  denies dizziness and fainting.  ENT: Denies blurred vision, hearing loss, tinnitus, frequent colds, epistaxis, hoarseness and tooth pain.    ENDOCRINE: Denies heat and cold intolerance, and polydipsia.    BREASTS:  Denies tenderness since LMP.  Denies discharge and lumps.  She does not do SBE on a monthly basis.  HEART/LUNGS: Denies dyspnea, wheezing, coughing and chest pain.  HEMATOLOGIC: Denies tendency to bruise and history of blood clots.  GASTROINTESTINAL: Denies heartburn, indigestion and change of color in stools.  She has had mild nausea..  Constipation hasbeen a problem since LMP.  She denies hemorrhoids.  Denies rectal bleeding.   GENITOURINARY:  Denies urgency, dysuria and hematuria.  Has frequency of urination since LMP.  She does not experience stress incontinence.  MUSCULOSKELETAL: Denies joint stiffness, pain and restricted motion.  NEUROLOGIC:  Denies seizures, weakness and fainting.  PSYCHIATRIC:  Denies mood changes  Has no previous psychiatric history or mental health treatment.  ===========================================    PHYSICAL EXAM  GENERAL:  29 year old pleasant pregnant female, alert, cooperative and well groomed.  SKIN:  Warm and dry, without lesions or tenderness.    HEAD: Symmetrical features.  EYES:  PERRLA, wears no corective lenses.  EARS: Tympanic membranes gray, translucent and intact.  NOSE: No flaring, patent  MOUTH:  Buccal mucosa pink, moist without lesions.  Teeth  in good repair.    NECK:  Thyroid without enlargement and nodules.  Lymph nodes not palpable.   LUNGS:  Clear to auscultation.  BREAST:  Symmetrical without lesions or nodes.  Nipples everted.  Areolas symmetric.  No palpable axillary nodes.  HEART:  RRR without murmur.  ABDOMEN: Soft without masses or tenderness.  No CVA tenderness.  Uterus not palpable.  No scars noted..  MUSCULOSKELETAL:  Full range of motion.  GENITALIA: Secondary female hair growth pattern is normal.  [place] and [place] glands are without tenderness or inflammation.  Perineum without lesions.    VAGINA:  Pink, normal ruga and discharge.  CERVIX:  Anterior, smooth, without discharge or CMT and nulliparous os, firm/ closed 5 cm long.  UTERUS: Retroverted, nontender 10 weeks in size.  ADNEXA: Unable to assess  RECTAL:  Normal appearance.  Digital exam deferred.  PELVIS:  Arch; wide . Sacrum; deep. Spines;blunt.  Side walls; straight. Type; gynecoid  Pelvis proven to -pelvis not tested.  EXTREMITIES:  2+/2+ DTR, No edema. No significant varicosities.  ===================================================    PLAN:  GC/Chlamydia screening: Declined  NOB Labs as appropriate for patient.     consult for US for AMA patients.  Pap as indicated.  Instructed on use of triage nurse line and contacting the on call CNM after hours in an emergency.      Genetic Screening testing reviewed:  Multiple Marker Screening (QUAD Test) :  which is performed between 15 and 20 wks and combines the patients age, and measurement of MSAFP, hCG, uE3 and Inhibin A from the maternal serum.  This screen detects 70% of Down Syndrome and 60% of Trisomy 18 infants with a 5% screen positive rate.   Patient will have QUAD screen drawn: Doesn't know - Reviewed.    Genetic Ultrasound which will be performed at 18-20 wks gestation will discover aneuploid markers in 60% of fetuses with Down Syndrome with 40% appearing normal by US.    Discussed the risks, benefits, side effects  and alternative therapies for current prescribed and OTC medications.    Will return to the clinic in 5 weeks for her next routine prenatal check.  Will call to be seen sooner if problems arise.    Rai PERDOMO, PERCYM

## 2019-10-31 NOTE — NURSING NOTE
"Chief Complaint   Patient presents with     Prenatal Care     10+2       Initial /80   Pulse 93   Temp 97.1  F (36.2  C) (Oral)   Wt 75.9 kg (167 lb 6.4 oz)   LMP 2019   BMI 28.73 kg/m   Estimated body mass index is 28.73 kg/m  as calculated from the following:    Height as of 10/7/19: 1.626 m (5' 4\").    Weight as of this encounter: 75.9 kg (167 lb 6.4 oz).  BP completed using cuff size: regular    Questioned patient about current smoking habits.  Pt. Former smoker          The following HM Due: NONE      The following patient reported/Care Every where data was sent to:  P ABSTRACT QUALITY INITIATIVES [[id number]]        patient has appointment for today  Emelia Mars                "

## 2019-11-04 ENCOUNTER — HEALTH MAINTENANCE LETTER (OUTPATIENT)
Age: 29
End: 2019-11-04

## 2019-12-06 ENCOUNTER — PRENATAL OFFICE VISIT (OUTPATIENT)
Dept: MIDWIFE SERVICES | Facility: CLINIC | Age: 29
End: 2019-12-06
Payer: COMMERCIAL

## 2019-12-06 VITALS
WEIGHT: 170 LBS | TEMPERATURE: 97.8 F | HEART RATE: 84 BPM | SYSTOLIC BLOOD PRESSURE: 114 MMHG | DIASTOLIC BLOOD PRESSURE: 71 MMHG | BODY MASS INDEX: 29.18 KG/M2

## 2019-12-06 DIAGNOSIS — Z34.81 ENCOUNTER FOR SUPERVISION OF OTHER NORMAL PREGNANCY, FIRST TRIMESTER: ICD-10-CM

## 2019-12-06 PROCEDURE — 99207 ZZC PRENATAL VISIT: CPT | Performed by: ADVANCED PRACTICE MIDWIFE

## 2019-12-06 NOTE — PROGRESS NOTES
15w3d  Feels well, working nights dealing well.   U/S for fetal survey next visit.  Doing better with constipation issues, herbal handout on constipation given.   rtc in 4 weeks jayden

## 2019-12-06 NOTE — PATIENT INSTRUCTIONS
CONSTIPATION  -WATER, WATER, WATER; 2.5 liters per (4 pints) of water per day  -increase vitamin B and E, and potassium, calcium, magnesium supplements  -eat lots of beans, nuts, whole grains, oats, barley, figs, apricots, rhubarb, apples, raisins, molasses honey, parsley, bran, prunes or prune juice, maple syrup, fresh dark green vegetables, fresh and dried fruits (especially apricots and grapes)  -eat popcorn every night  -eat 2-3 salads per day  -1-2 tablespoons of olive oil daily  -add a pinch of cayenne pepper to foods  - decrease red meat intake, refined white flour products (such as white rice, bread, pasta)  -Lemon juice in warm water   hour before breakfast  -1 Tsp. honey in hot water every morning before breakfast  -Lactobacilli or Acidopholus.  -Strong tea (infusion) of:   -chamomile -rosemary -psyllium seeds   -lemon balm -lavender -marshmallow root   -Senna leaf -cascara -dandelion root coffee   -alfalfa -dandelion   -fennel seeds -licorice root tea  Mild laxative juice:  1 cup pineapple juice   cup grape juice    cup aloe vera juice blend ingredients in a  (Elizabeth p.192)  Prepared by Zeynep Masters CNM

## 2020-01-07 ENCOUNTER — ANESTHESIA EVENT (OUTPATIENT)
Dept: OBGYN | Facility: CLINIC | Age: 30
End: 2020-01-07
Payer: COMMERCIAL

## 2020-01-07 ENCOUNTER — TRANSCRIBE ORDERS (OUTPATIENT)
Dept: MATERNAL FETAL MEDICINE | Facility: CLINIC | Age: 30
End: 2020-01-07

## 2020-01-07 ENCOUNTER — OFFICE VISIT (OUTPATIENT)
Dept: MATERNAL FETAL MEDICINE | Facility: CLINIC | Age: 30
End: 2020-01-07
Attending: ADVANCED PRACTICE MIDWIFE
Payer: COMMERCIAL

## 2020-01-07 ENCOUNTER — PRENATAL OFFICE VISIT (OUTPATIENT)
Dept: MIDWIFE SERVICES | Facility: CLINIC | Age: 30
End: 2020-01-07
Attending: ADVANCED PRACTICE MIDWIFE
Payer: COMMERCIAL

## 2020-01-07 ENCOUNTER — HOSPITAL ENCOUNTER (OUTPATIENT)
Facility: CLINIC | Age: 30
Setting detail: OBSERVATION
Discharge: HOME | End: 2020-01-08
Attending: OBSTETRICS & GYNECOLOGY | Admitting: OBSTETRICS & GYNECOLOGY
Payer: COMMERCIAL

## 2020-01-07 ENCOUNTER — ANESTHESIA (OUTPATIENT)
Dept: OBGYN | Facility: CLINIC | Age: 30
End: 2020-01-07
Payer: COMMERCIAL

## 2020-01-07 ENCOUNTER — ANCILLARY PROCEDURE (OUTPATIENT)
Dept: ULTRASOUND IMAGING | Facility: CLINIC | Age: 30
End: 2020-01-07
Attending: ADVANCED PRACTICE MIDWIFE
Payer: COMMERCIAL

## 2020-01-07 ENCOUNTER — PRE VISIT (OUTPATIENT)
Dept: MATERNAL FETAL MEDICINE | Facility: CLINIC | Age: 30
End: 2020-01-07

## 2020-01-07 ENCOUNTER — HOSPITAL ENCOUNTER (OUTPATIENT)
Dept: ULTRASOUND IMAGING | Facility: CLINIC | Age: 30
Discharge: HOME | End: 2020-01-07
Attending: ADVANCED PRACTICE MIDWIFE | Admitting: OBSTETRICS & GYNECOLOGY
Payer: COMMERCIAL

## 2020-01-07 VITALS
DIASTOLIC BLOOD PRESSURE: 75 MMHG | WEIGHT: 175 LBS | SYSTOLIC BLOOD PRESSURE: 119 MMHG | BODY MASS INDEX: 30.04 KG/M2 | HEART RATE: 81 BPM

## 2020-01-07 DIAGNOSIS — Z3A.20 20 WEEKS GESTATION OF PREGNANCY: ICD-10-CM

## 2020-01-07 DIAGNOSIS — O26.90 PREGNANCY RELATED CONDITION, ANTEPARTUM: Primary | ICD-10-CM

## 2020-01-07 DIAGNOSIS — Z34.81 ENCOUNTER FOR SUPERVISION OF OTHER NORMAL PREGNANCY, FIRST TRIMESTER: ICD-10-CM

## 2020-01-07 DIAGNOSIS — O34.32 CERVICAL INSUFFICIENCY IN PREGNANCY, ANTEPARTUM, SECOND TRIMESTER: ICD-10-CM

## 2020-01-07 DIAGNOSIS — O26.879 SHORT CERVIX AFFECTING PREGNANCY: Primary | ICD-10-CM

## 2020-01-07 DIAGNOSIS — O26.90 PREGNANCY RELATED CONDITION, ANTEPARTUM: ICD-10-CM

## 2020-01-07 DIAGNOSIS — O34.32 CERVICAL FUNNELING AFFECTING PREGNANCY IN SECOND TRIMESTER: ICD-10-CM

## 2020-01-07 DIAGNOSIS — O09.92 HIGH-RISK PREGNANCY, SECOND TRIMESTER: Primary | ICD-10-CM

## 2020-01-07 DIAGNOSIS — O26.872 SHORT CERVICAL LENGTH DURING PREGNANCY IN SECOND TRIMESTER: Primary | ICD-10-CM

## 2020-01-07 LAB
ABO + RH BLD: NORMAL
ABO + RH BLD: NORMAL
ALBUMIN UR-MCNC: NEGATIVE MG/DL
APPEARANCE UR: CLEAR
BASOPHILS # BLD AUTO: 0 10E9/L (ref 0–0.2)
BASOPHILS NFR BLD AUTO: 0.1 %
BILIRUB UR QL STRIP: NEGATIVE
BLD GP AB SCN SERPL QL: NORMAL
BLOOD BANK CMNT PATIENT-IMP: NORMAL
COLOR UR AUTO: YELLOW
DIFFERENTIAL METHOD BLD: NORMAL
EOSINOPHIL # BLD AUTO: 0 10E9/L (ref 0–0.7)
EOSINOPHIL NFR BLD AUTO: 0.3 %
ERYTHROCYTE [DISTWIDTH] IN BLOOD BY AUTOMATED COUNT: 12.4 % (ref 10–15)
GLUCOSE UR STRIP-MCNC: NEGATIVE MG/DL
HCT VFR BLD AUTO: 37.5 % (ref 35–47)
HGB BLD-MCNC: 12.7 G/DL (ref 11.7–15.7)
HGB UR QL STRIP: NEGATIVE
IMM GRANULOCYTES # BLD: 0 10E9/L (ref 0–0.4)
IMM GRANULOCYTES NFR BLD: 0.3 %
KETONES UR STRIP-MCNC: 40 MG/DL
LEUKOCYTE ESTERASE UR QL STRIP: ABNORMAL
LYMPHOCYTES # BLD AUTO: 1.8 10E9/L (ref 0.8–5.3)
LYMPHOCYTES NFR BLD AUTO: 16.7 %
MCH RBC QN AUTO: 31.9 PG (ref 26.5–33)
MCHC RBC AUTO-ENTMCNC: 33.9 G/DL (ref 31.5–36.5)
MCV RBC AUTO: 94 FL (ref 78–100)
MONOCYTES # BLD AUTO: 0.5 10E9/L (ref 0–1.3)
MONOCYTES NFR BLD AUTO: 5 %
MUCOUS THREADS #/AREA URNS LPF: PRESENT /LPF
NEUTROPHILS # BLD AUTO: 8.2 10E9/L (ref 1.6–8.3)
NEUTROPHILS NFR BLD AUTO: 77.6 %
NITRATE UR QL: NEGATIVE
NRBC # BLD AUTO: 0 10*3/UL
NRBC BLD AUTO-RTO: 0 /100
PH UR STRIP: 7 PH (ref 5–7)
PLATELET # BLD AUTO: 160 10E9/L (ref 150–450)
RBC # BLD AUTO: 3.98 10E12/L (ref 3.8–5.2)
RBC #/AREA URNS AUTO: <1 /HPF (ref 0–2)
SOURCE: ABNORMAL
SP GR UR STRIP: 1.01 (ref 1–1.03)
SPECIMEN EXP DATE BLD: NORMAL
SPECIMEN SOURCE: NORMAL
SQUAMOUS #/AREA URNS AUTO: 3 /HPF (ref 0–1)
UROBILINOGEN UR STRIP-MCNC: NORMAL MG/DL (ref 0–2)
WBC # BLD AUTO: 10.5 10E9/L (ref 4–11)
WBC #/AREA URNS AUTO: 2 /HPF (ref 0–5)
WET PREP SPEC: NORMAL

## 2020-01-07 PROCEDURE — 76816 OB US FOLLOW-UP PER FETUS: CPT | Performed by: OBSTETRICS & GYNECOLOGY

## 2020-01-07 PROCEDURE — 87086 URINE CULTURE/COLONY COUNT: CPT | Performed by: OBSTETRICS & GYNECOLOGY

## 2020-01-07 PROCEDURE — 37000008 ZZH ANESTHESIA TECHNICAL FEE, 1ST 30 MIN: Performed by: OBSTETRICS & GYNECOLOGY

## 2020-01-07 PROCEDURE — 86901 BLOOD TYPING SEROLOGIC RH(D): CPT | Performed by: STUDENT IN AN ORGANIZED HEALTH CARE EDUCATION/TRAINING PROGRAM

## 2020-01-07 PROCEDURE — 36000047 ZZH SURGERY LEVEL 1 EA 15 ADDTL MIN - UMMC: Performed by: OBSTETRICS & GYNECOLOGY

## 2020-01-07 PROCEDURE — 25000128 H RX IP 250 OP 636: Performed by: ANESTHESIOLOGY

## 2020-01-07 PROCEDURE — 25800030 ZZH RX IP 258 OP 636: Performed by: ANESTHESIOLOGY

## 2020-01-07 PROCEDURE — 25000128 H RX IP 250 OP 636: Performed by: STUDENT IN AN ORGANIZED HEALTH CARE EDUCATION/TRAINING PROGRAM

## 2020-01-07 PROCEDURE — 36000045 ZZH SURGERY LEVEL 1 1ST 30 MIN - UMMC: Performed by: OBSTETRICS & GYNECOLOGY

## 2020-01-07 PROCEDURE — 25800030 ZZH RX IP 258 OP 636: Performed by: STUDENT IN AN ORGANIZED HEALTH CARE EDUCATION/TRAINING PROGRAM

## 2020-01-07 PROCEDURE — 27210794 ZZH OR GENERAL SUPPLY STERILE: Performed by: OBSTETRICS & GYNECOLOGY

## 2020-01-07 PROCEDURE — 40000169 ZZH STATISTIC PRE-PROCEDURE ASSESSMENT I: Performed by: OBSTETRICS & GYNECOLOGY

## 2020-01-07 PROCEDURE — 86850 RBC ANTIBODY SCREEN: CPT | Performed by: STUDENT IN AN ORGANIZED HEALTH CARE EDUCATION/TRAINING PROGRAM

## 2020-01-07 PROCEDURE — 25000132 ZZH RX MED GY IP 250 OP 250 PS 637: Performed by: STUDENT IN AN ORGANIZED HEALTH CARE EDUCATION/TRAINING PROGRAM

## 2020-01-07 PROCEDURE — 87491 CHLMYD TRACH DNA AMP PROBE: CPT | Performed by: STUDENT IN AN ORGANIZED HEALTH CARE EDUCATION/TRAINING PROGRAM

## 2020-01-07 PROCEDURE — 12000001 ZZH R&B MED SURG/OB UMMC

## 2020-01-07 PROCEDURE — 76817 TRANSVAGINAL US OBSTETRIC: CPT | Performed by: OBSTETRICS & GYNECOLOGY

## 2020-01-07 PROCEDURE — 71000012 ZZH RECOVERY PHASE 1 LEVEL 1 FIRST HR: Performed by: OBSTETRICS & GYNECOLOGY

## 2020-01-07 PROCEDURE — 76811 OB US DETAILED SNGL FETUS: CPT

## 2020-01-07 PROCEDURE — 86900 BLOOD TYPING SEROLOGIC ABO: CPT | Performed by: STUDENT IN AN ORGANIZED HEALTH CARE EDUCATION/TRAINING PROGRAM

## 2020-01-07 PROCEDURE — 25000132 ZZH RX MED GY IP 250 OP 250 PS 637

## 2020-01-07 PROCEDURE — 25000128 H RX IP 250 OP 636: Performed by: NURSE ANESTHETIST, CERTIFIED REGISTERED

## 2020-01-07 PROCEDURE — 37000009 ZZH ANESTHESIA TECHNICAL FEE, EACH ADDTL 15 MIN: Performed by: OBSTETRICS & GYNECOLOGY

## 2020-01-07 PROCEDURE — 87591 N.GONORRHOEAE DNA AMP PROB: CPT | Performed by: STUDENT IN AN ORGANIZED HEALTH CARE EDUCATION/TRAINING PROGRAM

## 2020-01-07 PROCEDURE — 25000125 ZZHC RX 250: Performed by: STUDENT IN AN ORGANIZED HEALTH CARE EDUCATION/TRAINING PROGRAM

## 2020-01-07 PROCEDURE — 81001 URINALYSIS AUTO W/SCOPE: CPT | Performed by: STUDENT IN AN ORGANIZED HEALTH CARE EDUCATION/TRAINING PROGRAM

## 2020-01-07 PROCEDURE — 85025 COMPLETE CBC W/AUTO DIFF WBC: CPT | Performed by: STUDENT IN AN ORGANIZED HEALTH CARE EDUCATION/TRAINING PROGRAM

## 2020-01-07 PROCEDURE — 99207 ZZC PRENATAL VISIT: CPT | Performed by: ADVANCED PRACTICE MIDWIFE

## 2020-01-07 PROCEDURE — 87210 SMEAR WET MOUNT SALINE/INK: CPT | Performed by: STUDENT IN AN ORGANIZED HEALTH CARE EDUCATION/TRAINING PROGRAM

## 2020-01-07 RX ORDER — LIDOCAINE 40 MG/G
CREAM TOPICAL
Status: COMPLETED | OUTPATIENT
Start: 2020-01-07 | End: 2020-01-07

## 2020-01-07 RX ORDER — INDOMETHACIN 25 MG/1
50 CAPSULE ORAL EVERY 8 HOURS
Status: DISCONTINUED | OUTPATIENT
Start: 2020-01-07 | End: 2020-01-08 | Stop reason: HOSPADM

## 2020-01-07 RX ORDER — ONDANSETRON 2 MG/ML
4 INJECTION INTRAMUSCULAR; INTRAVENOUS EVERY 30 MIN PRN
Status: DISCONTINUED | OUTPATIENT
Start: 2020-01-07 | End: 2020-01-08 | Stop reason: HOSPADM

## 2020-01-07 RX ORDER — CEFAZOLIN SODIUM 1 G/3ML
1 INJECTION, POWDER, FOR SOLUTION INTRAMUSCULAR; INTRAVENOUS SEE ADMIN INSTRUCTIONS
Status: DISCONTINUED | OUTPATIENT
Start: 2020-01-07 | End: 2020-01-07

## 2020-01-07 RX ORDER — LIDOCAINE 40 MG/G
CREAM TOPICAL
Status: DISCONTINUED | OUTPATIENT
Start: 2020-01-07 | End: 2020-01-08 | Stop reason: HOSPADM

## 2020-01-07 RX ORDER — BUPIVACAINE HYDROCHLORIDE 7.5 MG/ML
INJECTION, SOLUTION INTRASPINAL PRN
Status: DISCONTINUED | OUTPATIENT
Start: 2020-01-07 | End: 2020-01-07

## 2020-01-07 RX ORDER — CITRIC ACID/SODIUM CITRATE 334-500MG
30 SOLUTION, ORAL ORAL ONCE
Status: DISCONTINUED | OUTPATIENT
Start: 2020-01-07 | End: 2020-01-08 | Stop reason: HOSPADM

## 2020-01-07 RX ORDER — SODIUM CHLORIDE, SODIUM LACTATE, POTASSIUM CHLORIDE, CALCIUM CHLORIDE 600; 310; 30; 20 MG/100ML; MG/100ML; MG/100ML; MG/100ML
INJECTION, SOLUTION INTRAVENOUS CONTINUOUS
Status: DISCONTINUED | OUTPATIENT
Start: 2020-01-07 | End: 2020-01-08 | Stop reason: HOSPADM

## 2020-01-07 RX ORDER — ONDANSETRON 2 MG/ML
4 INJECTION INTRAMUSCULAR; INTRAVENOUS EVERY 6 HOURS PRN
Status: DISCONTINUED | OUTPATIENT
Start: 2020-01-07 | End: 2020-01-08 | Stop reason: HOSPADM

## 2020-01-07 RX ORDER — ONDANSETRON 2 MG/ML
INJECTION INTRAMUSCULAR; INTRAVENOUS PRN
Status: DISCONTINUED | OUTPATIENT
Start: 2020-01-07 | End: 2020-01-07

## 2020-01-07 RX ORDER — CITRIC ACID/SODIUM CITRATE 334-500MG
SOLUTION, ORAL ORAL
Status: COMPLETED
Start: 2020-01-07 | End: 2020-01-07

## 2020-01-07 RX ORDER — ONDANSETRON 4 MG/1
4 TABLET, ORALLY DISINTEGRATING ORAL EVERY 30 MIN PRN
Status: DISCONTINUED | OUTPATIENT
Start: 2020-01-07 | End: 2020-01-08 | Stop reason: HOSPADM

## 2020-01-07 RX ORDER — FENTANYL CITRATE 50 UG/ML
25-50 INJECTION, SOLUTION INTRAMUSCULAR; INTRAVENOUS
Status: DISCONTINUED | OUTPATIENT
Start: 2020-01-07 | End: 2020-01-08 | Stop reason: HOSPADM

## 2020-01-07 RX ORDER — INDOMETHACIN 25 MG/1
25 CAPSULE ORAL EVERY 6 HOURS
Status: DISCONTINUED | OUTPATIENT
Start: 2020-01-08 | End: 2020-01-07

## 2020-01-07 RX ORDER — CEFAZOLIN SODIUM 1 G/3ML
1 INJECTION, POWDER, FOR SOLUTION INTRAMUSCULAR; INTRAVENOUS EVERY 8 HOURS
Status: DISCONTINUED | OUTPATIENT
Start: 2020-01-07 | End: 2020-01-08 | Stop reason: HOSPADM

## 2020-01-07 RX ORDER — INDOMETHACIN 25 MG/1
50 CAPSULE ORAL ONCE
Status: COMPLETED | OUTPATIENT
Start: 2020-01-07 | End: 2020-01-07

## 2020-01-07 RX ORDER — CEFAZOLIN SODIUM 2 G/100ML
2 INJECTION, SOLUTION INTRAVENOUS
Status: COMPLETED | OUTPATIENT
Start: 2020-01-07 | End: 2020-01-07

## 2020-01-07 RX ORDER — NALOXONE HYDROCHLORIDE 0.4 MG/ML
.1-.4 INJECTION, SOLUTION INTRAMUSCULAR; INTRAVENOUS; SUBCUTANEOUS
Status: DISCONTINUED | OUTPATIENT
Start: 2020-01-07 | End: 2020-01-08 | Stop reason: HOSPADM

## 2020-01-07 RX ORDER — MEPERIDINE HYDROCHLORIDE 25 MG/ML
12.5 INJECTION INTRAMUSCULAR; INTRAVENOUS; SUBCUTANEOUS
Status: DISCONTINUED | OUTPATIENT
Start: 2020-01-07 | End: 2020-01-08 | Stop reason: HOSPADM

## 2020-01-07 RX ADMIN — ONDANSETRON 4 MG: 2 INJECTION INTRAMUSCULAR; INTRAVENOUS at 18:18

## 2020-01-07 RX ADMIN — SODIUM CHLORIDE, POTASSIUM CHLORIDE, SODIUM LACTATE AND CALCIUM CHLORIDE: 600; 310; 30; 20 INJECTION, SOLUTION INTRAVENOUS at 18:52

## 2020-01-07 RX ADMIN — RANITIDINE 50 MG: 25 INJECTION, SOLUTION INTRAMUSCULAR; INTRAVENOUS at 19:13

## 2020-01-07 RX ADMIN — SODIUM CHLORIDE, POTASSIUM CHLORIDE, SODIUM LACTATE AND CALCIUM CHLORIDE: 600; 310; 30; 20 INJECTION, SOLUTION INTRAVENOUS at 18:50

## 2020-01-07 RX ADMIN — INDOMETHACIN 50 MG: 25 CAPSULE ORAL at 18:50

## 2020-01-07 RX ADMIN — SODIUM CITRATE AND CITRIC ACID MONOHYDRATE 30 ML: 500; 334 SOLUTION ORAL at 16:47

## 2020-01-07 RX ADMIN — BUPIVACAINE HYDROCHLORIDE IN DEXTROSE 1.6 ML: 7.5 INJECTION, SOLUTION SUBARACHNOID at 17:41

## 2020-01-07 RX ADMIN — SODIUM CHLORIDE, POTASSIUM CHLORIDE, SODIUM LACTATE AND CALCIUM CHLORIDE: 600; 310; 30; 20 INJECTION, SOLUTION INTRAVENOUS at 17:03

## 2020-01-07 RX ADMIN — CEFAZOLIN SODIUM 2 G: 2 INJECTION, SOLUTION INTRAVENOUS at 17:01

## 2020-01-07 RX ADMIN — LIDOCAINE: 40 CREAM TOPICAL at 15:10

## 2020-01-07 RX ADMIN — SODIUM CHLORIDE, POTASSIUM CHLORIDE, SODIUM LACTATE AND CALCIUM CHLORIDE: 600; 310; 30; 20 INJECTION, SOLUTION INTRAVENOUS at 15:34

## 2020-01-07 ASSESSMENT — MIFFLIN-ST. JEOR: SCORE: 1529.67

## 2020-01-07 NOTE — PROGRESS NOTES
"Please see \"Imaging\" tab under \"Chart Review\" for details of today's ultrasound.    Alexys Chacko M.D.  Specialist in Maternal-Fetal Medicine     "

## 2020-01-07 NOTE — PROGRESS NOTES
20w0d  Chanel is here with Valentino to review result of fetal survey. Ultrasound tech did not complete full fetal survey because she noted that the cervix was shortened with a 17mm funnel. [facility] was contacted immediately, and they will see Chanel this afternoon for comprehensive ultrasound. Discussed shortened cervix with Chanel and Valentino. Recommend that she maintain pelvic rest, and decreased activity today until appointment with MFM at 2:15pm. If she notes bleeding, leaking of fluid, or pelvic pressure, recommend coming immediately to the hospital.  Liam Eddy CNM

## 2020-01-07 NOTE — ANESTHESIA PREPROCEDURE EVALUATION
"Anesthesia Pre-Procedure Evaluation    Patient: Chanel Vázquez   MRN:     [id number] Gender:   female   Age:    29 year old :      1990        Preoperative Diagnosis: * No pre-op diagnosis entered *   Procedure(s):  CERCLAGE, CERVIX, VAGINAL APPROACH     No past medical history on file.   Past Surgical History:   Procedure Laterality Date     AS REPAIR CRUCIATE LIGAMENT,KNEE  2010     ELBOW SURGERY                     PHYSICAL EXAM:   Mental Status/Neuro: A/A/O   Airway: Facies: Feasible  Mallampati: I  Mouth/Opening: Full  TM distance: > 6 cm  Neck ROM: Full   Respiratory: Auscultation: CTAB     Resp. Rate: Normal     Resp. Effort: Normal      CV: Rhythm: Regular  Rate: Age appropriate  Heart: Normal Sounds  Edema: None   Comments:      Dental: Normal Dentition                LABS:  CBC:   Lab Results   Component Value Date    WBC 10.5 2020    WBC 6.8 10/07/2019    HGB 12.7 2020    HGB 13.5 10/07/2019    HCT 37.5 2020    HCT 39.4 10/07/2019     2020     10/07/2019     BMP: No results found for: NA, POTASSIUM, CHLORIDE, CO2, BUN, CR, GLC  COAGS: No results found for: PTT, INR, FIBR  POC:   Lab Results   Component Value Date    HCG Positive (A) 10/07/2019     OTHER: No results found for: PH, LACT, A1C, ANOOP, PHOS, MAG, ALBUMIN, PROTTOTAL, ALT, AST, GGT, ALKPHOS, BILITOTAL, BILIDIRECT, LIPASE, AMYLASE, STEPHANIE, TSH, T4, T3, CRP, SED     Preop Vitals    BP Readings from Last 3 Encounters:   20 120/75   20 119/75   19 114/71    Pulse Readings from Last 3 Encounters:   20 83   20 81   19 84      Resp Readings from Last 3 Encounters:   20 20   02 18    SpO2 Readings from Last 3 Encounters:   No data found for SpO2      Temp Readings from Last 1 Encounters:   20 36.8  C (98.3  F) (Oral)    Ht Readings from Last 1 Encounters:   20 1.651 m (5' 5\")      Wt Readings from Last 1 Encounters:   20 79.4 kg (175 lb)    " "Estimated body mass index is 29.12 kg/m  as calculated from the following:    Height as of this encounter: 1.651 m (5' 5\").    Weight as of this encounter: 79.4 kg (175 lb).     LDA:  Peripheral IV 01/07/20 Left Hand (Active)   Number of days: 0        Assessment:   ASA SCORE: 2            Plan:   Anes. Type:  MAC; Spinal   Pre-Medication: None   Induction:  N/a   Airway: Native Airway   Access/Monitoring: PIV   Maintenance: N/a     Postop Plan:   Postop Pain: Regional  Postop Sedation/Airway: Not planned  Disposition: Inpatient/Admit     PONV Management: Adult Risk Factors: Female   Prevention: Ondansetron     CONSENT: Direct conversation   Plan and risks discussed with: Patient   Blood Products: Consent Deferred (Minimal Blood Loss)                   Ambrose Todd MD  "

## 2020-01-07 NOTE — ANESTHESIA PROCEDURE NOTES
Spinal/LP Procedure Note    Spinal Block  Staff:     Anesthesiologist:  Arya Hollis MD  Location: OB  Procedure Start/Stop Times:      1/7/2020 5:17 PM    patient identified, IV checked, site marked, risks and benefits discussed, informed consent, monitors and equipment checked, pre-op evaluation, at physician/surgeon's request and post-op pain management      Correct Patient: Yes      Correct Position: Yes      Correct Site: Yes      Correct Procedure: Yes      Correct Laterality:  Yes    Site Marked:  Yes  Procedure:     Procedure:  Intrathecal    ASA:  2    Position:  Sitting    Sterile Prep: chloraprep      Insertion site:  L2-3    Approach:  Midline    Needle Type:  Quincke    Needle gauge (G):  25    Local Skin Infiltration:  1% lidocaine    amount (ml):  3    Needle Length (in):  3.5    Introducer used: Yes      Introducer gauge:  20 G    Attempts:  1    Redirects:  0    CSF:  Clear    Paresthesias:  No    Time injected:  17:21

## 2020-01-07 NOTE — LETTER
[facility]  [address]  [place] [address]  [phone]    2020    Re: Chanel Vázquez  [address] [place] [address]  [phone] (home)     : 1990      To Whom It May Concern:      Chanel Vázquez was hospitalized from 2020 until 2020. She may return to work on 2020 with light duties only and limited activity for at least 60 days.        Sincerely,        Amy Schumer, MD  20

## 2020-01-07 NOTE — OP NOTE
Operative Note: Cervical Cerclage         Pre-Op Diagnosis:   1) Single intrauterine pregnancy at 20w0d by LMP c/w 10w4d US  2) Cervical insufficiency by physical exam         Post-Op Diagnosis:   1) Same         Procedure:   1) Damian cervical cerclage, 2 sutures (knots at 1 o'clock position)         Surgeons:   Attending: Ronny Rosario MD  Fellow: Selma Hua MD, MD         Anesthesia:   Spinal          Estimated Blood Loss:   10 cc         Findings:   1) Large ectropion circumferentially around cervix  2) Cervix visually dilated to 1 cm prior to the procedure with mucous protruding through.  3) Cervix closed following the procedure  2) Fetal heart tones confirmed by doptone following the procedure         Specimens:   1) None         Complications:   1) None apparent          History:     Chanel Vázquez is a 29 year oldy.o.  at 20w0d by LMP c/w 10w4d US who was admitted for evaluation and management of shortened cervix with active funneling of membranes.  Infectious work-up was negative. There was no evidence of  contractions or labor.  After counseling regarding these findings, the patient has decided opted to proceed with Damian cerclage.         Details of Procedure:     After administration of spinal anesthesia the patient was placed in the dorsal lithotomy position and prepped and draped in the usual fashion.  A weighted speculum was placed into the vagina and right angle retractors were used to visualize the cervix.  The vagina and cervix were copiously cleansed with betadine solution. The bladder was then straight catheterized. Next, a circumferential stay suture was placed at the external cervix with a #2 Ethilon. Next, a circumferential suture of #2 Ethilon was placed in the usual fashion at the cervicovaginal reflection with knot tied at 1 o'clock position.   A second suture of #2 Ethilon was placed approximately 1cm proximal to the first stitch and tied at the 12 o'clock  position.  Both sutures were securely tied down and digital exam confirmed that the cervix was closed.  The stay suture was then removed, the cervix was examined and found to be tightly closed.     The bladder was drained of clear urine and a rectal exam confirmed that no sutures were present in the rectum.  The cervix and vaginal vault were inspected and noted to be free of injury and hemostatic.      The instruments were removed from the vagina. She tolerated her spinal anesthesia well without incident. She was subsequently transferred to the recovery room in satisfactory condition.     Sponge and needle counts were correct at the close of the case x 2.    Selma Hua MD  Maternal Fetal Medicine Fellow  1/7/2020 6:24 PM       Physician Attestation   I was present for the entire procedure of cervical cerclage.     Ronny Rosairo  Date of Service (when I saw the patient): 01/07/20

## 2020-01-07 NOTE — NURSING NOTE
D: Recommendation per Dr. Rosario is for patient to report to labor and delivery for cerclage placement. Labor and delivery charge notified. Patient left clinic ambulatory.   Yelena Steinberg RN

## 2020-01-07 NOTE — PLAN OF CARE
Chanel arrived with her , Valentino, from [facility] for assessment for cervical cerclage due to shortened cervix.  Admission completed, oriented to room, and procedure. Consents were signed, prepped for cerclage, and handoff given to Marva London RN who assumes care at 1600

## 2020-01-07 NOTE — H&P
[facility]  OB History and Physical      Chanel Vázquez MRN# [id number]   Age: 29 year old YOB: 1990     CC:  Shortened cervix    HPI:  Ms. Chanel Vázquez is a 29 year old  at 20w0d by LMP c/w 10w4d US, who presents to L&D for evaluation of shortened cervical length.  Her pregnancy has otherwise been uncomplicated.    She was seen today at the [facility] office for her routine anatomy scan. Unfortunately, at that time, her cervix was noted to be shortened to 0.6-0.8 cm with evidence of funneling.  After discussion of options, she elected to proceed to L&D for further evaluation and possible cerclage placement.    On arrival, she states she has otherwise been feeling well. She denies any fevers/chills, headaches, changes in vision, chest pain, shortness of breath, dysuria cramping, leakage of fluid, or vaginal bleeding.    Prenatal Labs:   Lab Results   Component Value Date    ABO A 10/07/2019    RH Pos 10/07/2019    AS Neg 10/07/2019    HEPBANG Nonreactive 10/07/2019    CHPCRT Negative 2018    GCPCRT Negative 2018    HGB 13.5 10/07/2019       OB History  OB History    Para Term  AB Living   1 0 0 0 0 0   SAB TAB Ectopic Multiple Live Births   0 0 0 0 0      # Outcome Date GA Lbr Simone/2nd Weight Sex Delivery Anes PTL Lv   1 Current                PMHx:   No past medical history on file.     PSHx:   Past Surgical History:   Procedure Laterality Date     AS REPAIR CRUCIATE LIGAMENT,KNEE  2010     ELBOW SURGERY       Meds:   Medications Prior to Admission   Medication Sig Dispense Refill Last Dose     Prenatal Vit-Fe Fumarate-FA (PRENATAL MULTIVITAMIN W/IRON) 27-0.8 MG tablet Take 1 tablet by mouth daily   2020 at 2100     progesterone (PROMETRIUM) 200 MG capsule Place 1 capsule (200 mg) vaginally daily 30 capsule 3      Allergies:  No Known Allergies     FmHx:   Family History   Problem Relation Age of Onset     Depression Mother      Anxiety  Disorder Mother      Asthma Father      Arthritis Father      Depression Brother      Anxiety Disorder Brother      Hyperlipidemia Brother      Diabetes Maternal Grandmother      Diabetes Maternal Grandfather      Heart Disease Maternal Grandfather      Diabetes Paternal Grandmother      Cancer Other         Paternal Grandfather     SocHx: She denies any tobacco, alcohol, or other drug use during this pregnancy.    ROS:   Complete 10-point ROS negative except as noted in HPI    Physical Exam:  Vit:   Patient Vitals for the past 4 hrs:   BP Temp Temp src Pulse Resp   20 1439 120/75 98.3  F (36.8  C) Oral 83 20      Gen: Well-appearing, NAD, comfortable  CV: Regular rate  Pulm: Normal respiratory effort  Abd: Soft, gravid, non-tender  Ext: no LE edema b/l  Cx: Deferred         [place]op[place]es: 140s  [place]: 0 contractions in 10 minutes      Assessment/Plan  Ms. Chanel Vázquez is a 29 year old  at 20w0d by LMP c/w 10w4d US, who presents to L&D for evaluation of shortened cervical length.  Her pregnancy has otherwise been uncomplicated.    # Shortened cervix + Funneling of membranes  - Evaluation for infectious etiology leading to shortened cervix, including T&S, CBC, wet prep, GC/CT, and UA.  - Continuous tocometry at this time to evaluate for evidence of  contractions/labor  - Discussed various etiologies for cervical shortening.  - Discussed risks of a cerclage including but not limited to: bleeding (including placental), infection (including chorioamnionitis), damage to surrounding structures including but not limited to bowel, bladder, cervix, risk of PPROM, failure of cerclage to prevent pre-viable, elizabeth-viable or  birth, possible increased need for  delivery.  - Surgical consent signed.NPO at this time for possible cerclage this evening  - Anesthesia alerted. No plans for antibiotics unless membranes visualized.    The patient was discussed with Dr. Rosairo who is in agreement with the  treatment plan.    Selma Hua MD  Maternal Fetal Medicine Fellow  2020 2:54 PM      Physician Attestation   I, Ronny Rosario MD, saw this patient with the resident and agree with the resident/fellow's findings and plan of care as documented in the note.      I personally reviewed vital signs, medications, labs, imaging and EFM.    Key findings:  In summary, Chanel Vázquez is a  at 20w0d admitted due to incidentally noted cervical shortening at time of routine US today.  Chanel states she has overall been feeling well and denies any symptoms of contractions, LOF or vaginal discharge.  We reviewed the finding of very shortened cervix on today's US.  We reviewed that generally, cervical shortening < 25 mm is a risk factor for PTB and that this risk may be reduced with the use of vaginal progesterone 200 mg PV q hs.  However, we also reviewed that there is some data suggesting that women with a very short cervix ( < 10 mm), may also benefit from the intervention of cervical cerclage.  We did also review the other differential diagnoses for the US finding, including possibility of previable labor/miscarriage, possibility of intrauterine infection, or that the cervical shortening is a risk factor for  delivery, that may not be ameliorated with cerclage placement. ?We had a long discussion regarding the risks, benefits and alternatives of continued expectant management with vaginal progesterone vs proceeding with rescue cerclage placement today. Chanel opted to proceed with cerclage placement today.?See note for details.    Ronny Rosario MD    Time Spent on this Encounter   I spent 40 minutes on the unit/floor managing the care of Chanel Vázquez. Over 50% of my time was spent on the following:   - Counseling the patient and/or family regarding: diagnostic results, prognosis, risks and benefits of treatment options, recommended follow-up and prevention of disease  - Coordination of care with the:  nurse and patient     In summary, Chanel Vázquez is a  at 20w0d admitted due to incidentally noted cervical shortening at time of routine US today.  Chanel states she has overall been feeling well and denies any symptoms of contractions, LOF or vaginal discharge.  We reviewed the finding of very shortened cervix on today's US.  We reviewed that generally, cervical shortening < 25 mm is a risk factor for PTB and that this risk may be reduced with the use of vaginal progesterone 200 mg PV q hs.  However, we also reviewed that there is some data suggesting that women with a very short cervix ( < 10 mm), may also benefit from the intervention of cervical cerclage.  We did also review the other differential diagnoses for the US finding, including possibility of previable labor/miscarriage, possibility of intrauterine infection, or that the cervical shortening is a risk factor for  delivery, that may not be ameliorated with cerclage placement. ?We had a long discussion regarding the risks, benefits and alternatives of continued expectant management with vaginal progesterone vs proceeding with rescue cerclage placement today. Chanel opted to proceed with cerclage placement today.?See note for details.    Ronny Rosario MD

## 2020-01-08 ENCOUNTER — HOSPITAL ENCOUNTER (INPATIENT)
Dept: ULTRASOUND IMAGING | Facility: CLINIC | Age: 30
End: 2020-01-08
Payer: COMMERCIAL

## 2020-01-08 ENCOUNTER — HOSPITAL ENCOUNTER (OUTPATIENT)
Facility: CLINIC | Age: 30
Setting detail: OBSERVATION
End: 2020-01-08
Admitting: OBSTETRICS & GYNECOLOGY
Payer: COMMERCIAL

## 2020-01-08 VITALS
SYSTOLIC BLOOD PRESSURE: 107 MMHG | HEIGHT: 65 IN | TEMPERATURE: 97.6 F | DIASTOLIC BLOOD PRESSURE: 68 MMHG | OXYGEN SATURATION: 95 % | HEART RATE: 59 BPM | WEIGHT: 175 LBS | RESPIRATION RATE: 16 BRPM | BODY MASS INDEX: 29.16 KG/M2

## 2020-01-08 PROBLEM — Z03.75 SUSPECTED SHORTENING OF CERVIX NOT FOUND: Status: ACTIVE | Noted: 2020-01-08

## 2020-01-08 LAB
BACTERIA SPEC CULT: NORMAL
C TRACH DNA SPEC QL NAA+PROBE: NEGATIVE
Lab: NORMAL
N GONORRHOEA DNA SPEC QL NAA+PROBE: NEGATIVE
SPECIMEN SOURCE: NORMAL

## 2020-01-08 PROCEDURE — 25000132 ZZH RX MED GY IP 250 OP 250 PS 637: Performed by: STUDENT IN AN ORGANIZED HEALTH CARE EDUCATION/TRAINING PROGRAM

## 2020-01-08 PROCEDURE — 76817 TRANSVAGINAL US OBSTETRIC: CPT

## 2020-01-08 PROCEDURE — G0378 HOSPITAL OBSERVATION PER HR: HCPCS

## 2020-01-08 PROCEDURE — 25000132 ZZH RX MED GY IP 250 OP 250 PS 637: Performed by: OBSTETRICS & GYNECOLOGY

## 2020-01-08 PROCEDURE — 25000128 H RX IP 250 OP 636: Performed by: STUDENT IN AN ORGANIZED HEALTH CARE EDUCATION/TRAINING PROGRAM

## 2020-01-08 RX ORDER — FAMOTIDINE 10 MG
10 TABLET ORAL 2 TIMES DAILY
Status: DISCONTINUED | OUTPATIENT
Start: 2020-01-08 | End: 2020-01-08 | Stop reason: HOSPADM

## 2020-01-08 RX ORDER — DOCUSATE SODIUM 100 MG/1
100 CAPSULE, LIQUID FILLED ORAL 2 TIMES DAILY PRN
Qty: 60 CAPSULE | Refills: 0 | Status: SHIPPED | OUTPATIENT
Start: 2020-01-08 | End: 2020-01-08

## 2020-01-08 RX ORDER — ACETAMINOPHEN 325 MG/1
325-650 TABLET ORAL EVERY 6 HOURS PRN
Qty: 50 TABLET | Refills: 0 | Status: SHIPPED | OUTPATIENT
Start: 2020-01-08 | End: 2020-01-08

## 2020-01-08 RX ADMIN — CEFAZOLIN 1 G: 1 INJECTION, POWDER, FOR SOLUTION INTRAMUSCULAR; INTRAVENOUS at 08:52

## 2020-01-08 RX ADMIN — INDOMETHACIN 50 MG: 25 CAPSULE ORAL at 02:52

## 2020-01-08 RX ADMIN — FAMOTIDINE 10 MG: 10 TABLET, COATED ORAL at 10:15

## 2020-01-08 RX ADMIN — RANITIDINE 50 MG: 25 INJECTION, SOLUTION INTRAMUSCULAR; INTRAVENOUS at 02:53

## 2020-01-08 RX ADMIN — CEFAZOLIN 1 G: 1 INJECTION, POWDER, FOR SOLUTION INTRAMUSCULAR; INTRAVENOUS at 01:03

## 2020-01-08 RX ADMIN — INDOMETHACIN 50 MG: 25 CAPSULE ORAL at 11:19

## 2020-01-08 NOTE — PLAN OF CARE
Data: Today is hospital day one. Maternal status stable. Fetal assessment is normal  Action: Continue with plan of care, which is discharge patient home today. Patient encouraged to move extremities while in bed.  Response: Patient coping will discharged home and will follow up with provider.

## 2020-01-08 NOTE — PLAN OF CARE
Pt s/p cerclage placement. VSS, afebrile. Pt feeling some cramping overnight, but not keeping her awake. TOCO on overnight, some irritability noted. Interference with centricity overnight, paper strip stored to be sent to medical records for documentation. Pt denies any bleeding or LOF. She has voided overnight w/o difficulty, tolerating food/drink and ambulating independently. IV abx, indocin and zantac given overnight. Will proceed with present cares.

## 2020-01-08 NOTE — DISCHARGE SUMMARY
[facility] Discharge Summary    Chanel Vázquez MRN# [id number]   Age: 29 year old YOB: 1990     Date of Admission:  2020  Date of Discharge::  20  Admitting Physician:  Ronny Rosario MD  Discharge Physician:  Ronny Rosario MD          Admission Diagnoses:   -IUP at 20w0d  -Cervical insufficiency          Discharge Diagnosis:     - IUP at 20w1d  - Same as above          Procedures:     Procedure(s): - Damian cerclage placement            Medications Prior to Admission:     Medications Prior to Admission   Medication Sig Dispense Refill Last Dose     Prenatal Vit-Fe Fumarate-FA (PRENATAL MULTIVITAMIN W/IRON) 27-0.8 MG tablet Take 1 tablet by mouth daily   2020 at 2100     progesterone (PROMETRIUM) 200 MG capsule Place 1 capsule (200 mg) vaginally daily 30 capsule 3              Discharge Medications:        Review of your medicines      CONTINUE these medicines which have NOT CHANGED      Dose / Directions   prenatal multivitamin w/iron 27-0.8 MG tablet      Dose:  1 tablet  Take 1 tablet by mouth daily  Refills:  0     progesterone 200 MG capsule  Commonly known as:  PROMETRIUM  Used for:  Short cervix affecting pregnancy      Dose:  200 mg  Place 1 capsule (200 mg) vaginally daily  Quantity:  30 capsule  Refills:  3                    Consultations:   Anesthesia          Brief Admission History   Chanel Vázquez is a 29 year oldy.o.  at 20w0d by LMP c/w 10w4d US who was admitted for evaluation and management of shortened cervix with active funneling of membranes.  Infectious work-up was negative. There was no evidence of  contractions or labor.  After counseling regarding these findings, the patient has decided opted to proceed with Damian cerclage.         Hospital Course:       On discharge, her pain was well controlled.   Vaginal bleeding was very scant.   Voiding without difficulty.  Ambulating well and tolerating a normal diet.  No fever.              Discharge Instructions and Follow-Up:     Discharge diet: Regular   Discharge activity: Pelvic rest, avoid heavy lifting, avoiding standing for prolonged periods of time.   Discharge follow-up: With your ob/gyn within the next 1 week or if any concerns arise           Discharge Disposition:     Discharged to home in stable condition      Amy Schumer, MD  Obstetrics and Gynecology, PGY-3  01/08/20 3:29 PM    Physician Attestation   I, Ronny Rosario, saw and evaluated this patient prior to discharge.  I discussed the patient with the resident/fellow and agree with plan of care as documented in the note.      I personally reviewed vital signs, medications, labs and imaging.    I personally spent 30 minutes on discharge activities.    Ronny Rosario MD  Date of Service (when I saw the patient): 1/8/20

## 2020-01-08 NOTE — PLAN OF CARE
Dr. Rosario assessed patient at bedside and stated patient could discharge home. Patient instructed to report change in fetal movement, vaginal leaking of fluid or bleeding, abdominal pain, or any concerns related to the pregnancy to her nurse/physician. Patient verbalized understanding of education and verbalized agreement with plan. Discharged ambulatory at 1550.

## 2020-01-08 NOTE — PLAN OF CARE
Data: Chanel Vázquez transferred to [facility] via cart  at 1953.   Response: Patient tolerated transfer and is stable.

## 2020-01-08 NOTE — ANESTHESIA CARE TRANSFER NOTE
Patient: Chanel Vázquez    Procedure(s):  CERCLAGE, CERVIX, VAGINAL APPROACH    Diagnosis: * No pre-op diagnosis entered *  Diagnosis Additional Information: No value filed.    Anesthesia Type:   MAC, Spinal     Note:  Airway :Room Air  Patient transferred to:Labor and Delivery  Handoff Report: Identifed the Patient, Identified the Reponsible Provider, Reviewed the pertinent medical history, Discussed the surgical course, Reviewed Intra-OP anesthesia mangement and issues during anesthesia, Set expectations for post-procedure period and Allowed opportunity for questions and acknowledgement of understanding      Vitals: (Last set prior to Anesthesia Care Transfer)    CRNA VITALS  1/7/2020 1749 - 1/7/2020 1824      1/7/2020             EKG:  Sinus rhythm                Electronically Signed By: CONOR Corcoran CRNA  January 7, 2020  6:24 PM

## 2020-01-08 NOTE — PROGRESS NOTES
Brief Progress Note    Went to see how Chanel is doing this afternoon. Reports she is feeling well. Scant pink discharge when she wipes. Occasional very low cramp, nothing consistent and nothing felt in the abdomen. Pain well controlled.    Vitals:    20 2305 20 2309 20 2347 20 0315   BP: (!) 87/50 106/61 108/59 107/68   BP Location:       Pulse:       Resp: 16 16 16 16   Temp:   97.7  F (36.5  C) 97.6  F (36.4  C)   TempSrc:   Oral Oral   SpO2:       Weight:       Height:         General -comforatble, in NAD  Abd - soft, non-tender    A/P: Chanel is a 30 yo  at 20w1d POD#1 s/p Damian cerclage for shortened cervix with funneling.  - s/p 24 hours IV ancef, indomethacin with GI protection  - no signs of labor or further bleeding  - plan to discharge to home this evening  - work note provided for light duties when returns next week  - reviewed avoiding straining. Pt will  colace if has constipation.  - no further questions or concerns    Amy Schumer, MD  Ob/Gyn PGY-3  20 3:33 PM

## 2020-01-08 NOTE — PROGRESS NOTES
"[facility]   Antepartum Note        S: Patient reports she is feeling well today. Reports no vaginal bleeding overnight. Denies leaking fluid, contractions.  Did have some mild cramping overnight that has resolved.  +FM.     O:   Temp:  [97.5  F (36.4  C)-98.3  F (36.8  C)] 97.6  F (36.4  C)  Pulse:  [59-83] 59  Heart Rate:  [60-65] 60  Resp:  [14-27] 16  BP: ()/(50-75) 107/68  SpO2:  [94 %-99 %] 95 %     Gen:      Resting comfortably, NAD  Abd:      Soft, gravid, non-tender to palpation  Ext:       non-tender, trace LE edema b/l    US: Please see \"Imaging\" tab under \"Chart Review\" for details of today's visit.     FHT:  Baseline 130's bpm, moderate to marked variability, present accels, occasional variable decels  [place]: No ctx in 10 minutes      Component      Latest Ref Rng & Units 1/7/2020 1/7/2020           2:45 PM  2:45 PM   WBC      4.0 - 11.0 10e9/L     RBC Count      3.8 - 5.2 10e12/L     Hemoglobin      11.7 - 15.7 g/dL     Hematocrit      35.0 - 47.0 %     MCV      78 - 100 fl     MCH      26.5 - 33.0 pg     MCHC      31.5 - 36.5 g/dL     RDW      10.0 - 15.0 %     Platelet Count      150 - 450 10e9/L     Diff Method           % Neutrophils      %     % Lymphocytes      %     % Monocytes      %     % Eosinophils      %     % Basophils      %     % Immature Granulocytes      %     Nucleated RBCs      0 /100     Absolute Neutrophil      1.6 - 8.3 10e9/L     Absolute Lymphocytes      0.8 - 5.3 10e9/L     Absolute Monocytes      0.0 - 1.3 10e9/L     Absolute Eosinophils      0.0 - 0.7 10e9/L     Absolute Basophils      0.0 - 0.2 10e9/L     Abs Immature Granulocytes      0 - 0.4 10e9/L     Absolute Nucleated RBC           Color Urine           Appearance Urine           Glucose Urine      NEG:Negative mg/dL     Bilirubin Urine      NEG:Negative     Ketones Urine      NEG:Negative mg/dL     Specific Gravity Urine      1.003 - 1.035     Blood Urine      NEG:Negative     pH " Urine      5.0 - 7.0 pH     Protein Albumin Urine      NEG:Negative mg/dL     Urobilinogen mg/dL      0.0 - 2.0 mg/dL     Nitrite Urine      NEG:Negative     Leukocyte Esterase Urine      NEG:Negative     Source           WBC Urine      0 - 5 /HPF     RBC Urine      0 - 2 /HPF     Squamous Epithelial /HPF Urine      0 - 1 /HPF     Mucous Urine      NEG:Negative /LPF     ABO           RH(D)           Antibody Screen           Test Valid Only At           Specimen Expires           Specimen Description       Vagina    Wet Prep       Moderate . . . No motile Trichomonas seen   Special Requests           Culture Micro             Component      Latest Ref Rng & Units 1/7/2020 1/7/2020           2:45 PM  2:45 PM   WBC      4.0 - 11.0 10e9/L     RBC Count      3.8 - 5.2 10e12/L     Hemoglobin      11.7 - 15.7 g/dL     Hematocrit      35.0 - 47.0 %     MCV      78 - 100 fl     MCH      26.5 - 33.0 pg     MCHC      31.5 - 36.5 g/dL     RDW      10.0 - 15.0 %     Platelet Count      150 - 450 10e9/L     Diff Method           % Neutrophils      %     % Lymphocytes      %     % Monocytes      %     % Eosinophils      %     % Basophils      %     % Immature Granulocytes      %     Nucleated RBCs      0 /100     Absolute Neutrophil      1.6 - 8.3 10e9/L     Absolute Lymphocytes      0.8 - 5.3 10e9/L     Absolute Monocytes      0.0 - 1.3 10e9/L     Absolute Eosinophils      0.0 - 0.7 10e9/L     Absolute Basophils      0.0 - 0.2 10e9/L     Abs Immature Granulocytes      0 - 0.4 10e9/L     Absolute Nucleated RBC           Color Urine           Appearance Urine           Glucose Urine      NEG:Negative mg/dL     Bilirubin Urine      NEG:Negative     Ketones Urine      NEG:Negative mg/dL     Specific Gravity Urine      1.003 - 1.035     Blood Urine      NEG:Negative     pH Urine      5.0 - 7.0 pH     Protein Albumin Urine      NEG:Negative mg/dL     Urobilinogen mg/dL      0.0 - 2.0 mg/dL     Nitrite Urine      NEG:Negative      Leukocyte Esterase Urine      NEG:Negative     Source           WBC Urine      0 - 5 /HPF     RBC Urine      0 - 2 /HPF     Squamous Epithelial /HPF Urine      0 - 1 /HPF     Mucous Urine      NEG:Negative /LPF     ABO           RH(D)           Antibody Screen           Test Valid Only At           Specimen Expires           Specimen Description           Wet Prep       No yeast seen No clue cells seen   Special Requests           Culture Micro             Component      Latest Ref Rng & Units 1/7/2020           3:15 PM   WBC      4.0 - 11.0 10e9/L    RBC Count      3.8 - 5.2 10e12/L    Hemoglobin      11.7 - 15.7 g/dL    Hematocrit      35.0 - 47.0 %    MCV      78 - 100 fl    MCH      26.5 - 33.0 pg    MCHC      31.5 - 36.5 g/dL    RDW      10.0 - 15.0 %    Platelet Count      150 - 450 10e9/L    Diff Method          % Neutrophils      %    % Lymphocytes      %    % Monocytes      %    % Eosinophils      %    % Basophils      %    % Immature Granulocytes      %    Nucleated RBCs      0 /100    Absolute Neutrophil      1.6 - 8.3 10e9/L    Absolute Lymphocytes      0.8 - 5.3 10e9/L    Absolute Monocytes      0.0 - 1.3 10e9/L    Absolute Eosinophils      0.0 - 0.7 10e9/L    Absolute Basophils      0.0 - 0.2 10e9/L    Abs Immature Granulocytes      0 - 0.4 10e9/L    Absolute Nucleated RBC          Color Urine       Yellow   Appearance Urine       Clear   Glucose Urine      NEG:Negative mg/dL Negative   Bilirubin Urine      NEG:Negative Negative   Ketones Urine      NEG:Negative mg/dL 40 (A)   Specific Gravity Urine      1.003 - 1.035 1.015   Blood Urine      NEG:Negative Negative   pH Urine      5.0 - 7.0 pH 7.0   Protein Albumin Urine      NEG:Negative mg/dL Negative   Urobilinogen mg/dL      0.0 - 2.0 mg/dL Normal   Nitrite Urine      NEG:Negative Negative   Leukocyte Esterase Urine      NEG:Negative Moderate (A)   Source       Clean catch urine   WBC Urine      0 - 5 /HPF 2   RBC Urine      0 - 2 /HPF <1    Squamous Epithelial /HPF Urine      0 - 1 /HPF 3 (H)   Mucous Urine      NEG:Negative /LPF Present (A)   ABO          RH(D)          Antibody Screen          Test Valid Only At          Specimen Expires          Specimen Description       Midstream Urine   Wet Prep          Special Requests       Specimen received in preservative   Culture Micro       PENDING     Component      Latest Ref Rng & Units 1/7/2020           3:30 PM   WBC      4.0 - 11.0 10e9/L 10.5   RBC Count      3.8 - 5.2 10e12/L 3.98   Hemoglobin      11.7 - 15.7 g/dL 12.7   Hematocrit      35.0 - 47.0 % 37.5   MCV      78 - 100 fl 94   MCH      26.5 - 33.0 pg 31.9   MCHC      31.5 - 36.5 g/dL 33.9   RDW      10.0 - 15.0 % 12.4   Platelet Count      150 - 450 10e9/L 160   Diff Method       Automated Method   % Neutrophils      % 77.6   % Lymphocytes      % 16.7   % Monocytes      % 5.0   % Eosinophils      % 0.3   % Basophils      % 0.1   % Immature Granulocytes      % 0.3   Nucleated RBCs      0 /100 0   Absolute Neutrophil      1.6 - 8.3 10e9/L 8.2   Absolute Lymphocytes      0.8 - 5.3 10e9/L 1.8   Absolute Monocytes      0.0 - 1.3 10e9/L 0.5   Absolute Eosinophils      0.0 - 0.7 10e9/L 0.0   Absolute Basophils      0.0 - 0.2 10e9/L 0.0   Abs Immature Granulocytes      0 - 0.4 10e9/L 0.0   Absolute Nucleated RBC       0.0   Color Urine          Appearance Urine          Glucose Urine      NEG:Negative mg/dL    Bilirubin Urine      NEG:Negative    Ketones Urine      NEG:Negative mg/dL    Specific Gravity Urine      1.003 - 1.035    Blood Urine      NEG:Negative    pH Urine      5.0 - 7.0 pH    Protein Albumin Urine      NEG:Negative mg/dL    Urobilinogen mg/dL      0.0 - 2.0 mg/dL    Nitrite Urine      NEG:Negative    Leukocyte Esterase Urine      NEG:Negative    Source          WBC Urine      0 - 5 /HPF    RBC Urine      0 - 2 /HPF    Squamous Epithelial /HPF Urine      0 - 1 /HPF    Mucous Urine      NEG:Negative /LPF    ABO       A   RH(D)        Pos   Antibody Screen       Neg   Test Valid Only At       [facility]   Specimen Expires       01/10/2020   Specimen Description          Wet Prep          Special Requests          Culture Micro               Assessment/Plan:  Chanel Vázquez is a  at 20w1d s/p exam indicated cerclage placement yesterday 20.     Cervical insufficiency  - S/P Rescue cerclage yesterday  - Cervix appears closed on TV US today  - Patient with no apparent post-operative complications and both maternal and fetal status are stable and reassuring  - Continue vaginal progesterone 200 mg PV q hs     Ppx  - encouraged ambulation, SCD for when in bed     Dispo: to home today pending no change in status.  Follow up at [facility] in 1 week for TV US (we will call Chanel with this appointment) and if stable, no further TV US are recommended.    Ronny Rosario MD    Time Spent on this Encounter   I spent 15 minutes on the unit/floor managing the care of Chanel Vázquez. Over 50% of my time was spent on the following:   - Counseling the patient and/or family regarding: diagnostic results, prognosis, risks and benefits of treatment options and recommended follow-up  - Coordination of care with the: nurse    Chanel Vázquez is a  at 20w1d admitted for exam indicated cerclage placement, which was successfully placed yesterday.  Plan to complete course of indocin and ancef x 24 hours and if meets criteria for discharge home, anticipate discharge home this PM.        Ronny Rosario MD

## 2020-01-08 NOTE — PLAN OF CARE
Patient here for cervical cerclage for short cervix, funneling and 1-2 cm dilation. VSS, denies leaking of fluid, vaginal bleeding. No contractions seen on toco, patient does report uterine cramping, using heat packs for comfort. Patient has been able to eat and drink normally, has been able to ambulate to the bathroom to void, but unable to fully empty her bladder. Ancef is ordered Q 8 hours, indocin 50 mg ordered Q 8 hours and zantac BID.   baseline per doppler. Denies needs at this time, will continue to monitor.

## 2020-01-09 ENCOUNTER — TELEPHONE (OUTPATIENT)
Dept: MATERNAL FETAL MEDICINE | Facility: CLINIC | Age: 30
End: 2020-01-09

## 2020-01-09 NOTE — UTILIZATION REVIEW
"Admission Status; Secondary Review Determination     Under the authority of the Utilization Management Committee, the utilization review process indicated a secondary review on the above patient. The review outcome is based on review of the medical records, discussions with staff, and applying clinical experience noted on the date of the review.     (X) Observation Status Appropriate - This patient does not meet hospital inpatient criteria and is placed in observation status. If this patient's primary payer is Medicare and was admitted as an inpatient, Condition Code 44 should be used and patient status changed to \"observation\".     RATIONALE FOR DETERMINATION:   29 year old  at 20w0d by LMP c/w 10w4d US who was admitted for evaluation and management of shortened cervix with active funneling of membranes.  Infectious work-up was negative. There was no evidence of  contractions or labor.  After counseling regarding these findings, the patient opted to proceed with Damian cerclage which was successfully performed on 20.      The severity of illness, intensity of service provided, expected LOS and risk for adverse outcome make the care appropriate for further observation; however, doesn't meet criteria for hospital inpatient admission. This was communicated to attending physician Dr. Schumer who concurred with this determination.    The information on this document is developed by the utilization review team in order for the business office to ensure compliance. This only denotes the appropriateness of proper admission status and does not reflect the quality of care rendered.     The definitions of Inpatient Status and Observation Status used in making the determination above are those provided in the CMS Coverage Manual, Chapter 1 and Chapter 6, section 70.4.     Sincerely,     Collin Roth  Physician Advisor  Utilization Management  [facility]            "

## 2020-01-09 NOTE — TELEPHONE ENCOUNTER
Writer called and spoke with Chanel regarding negative urine results. Chanel reports she is doing well post cerclage. She denies cramping or bleeding. She says she is doing well.  Brenda Wolfe RN

## 2020-01-10 NOTE — ANESTHESIA POSTPROCEDURE EVALUATION
Anesthesia POST Procedure Evaluation    Patient: Chanel Vázquez   MRN:     [id number] Gender:   female   Age:    29 year old :      1990        Preoperative Diagnosis: * No pre-op diagnosis entered *   Procedure(s):  CERCLAGE, CERVIX, VAGINAL APPROACH   Postop Comments: No value filed.       Anesthesia Type:  Not documented  MAC, Spinal    JZG FV AN POST EVALUATION    Last Anesthesia Record Vitals:  CRNA VITALS  2020 1749 - 2020 1849      2020             EKG:  Sinus rhythm          Last PACU Vitals:  Vitals Value Taken Time   /68 2020  8:24 AM   Temp 36.4  C (97.6  F) 2020  3:15 AM   Pulse 59 2020  7:25 PM   Resp 16 2020  3:15 AM   SpO2 95 % 2020  9:30 PM   Temp src     NIBP     Pulse     SpO2     Resp     Temp     Ht Rate     Temp 2     Vitals shown include unvalidated device data.      Electronically Signed By: Ambrose Todd MD, 2020, 7:14 PM

## 2020-01-13 DIAGNOSIS — O35.9XX0 SUSPECTED FETAL ABNORMALITY AFFECTING MANAGEMENT OF MOTHER, ANTEPARTUM, SINGLE OR UNSPECIFIED FETUS: Primary | ICD-10-CM

## 2020-01-14 ENCOUNTER — PRENATAL OFFICE VISIT (OUTPATIENT)
Dept: OBGYN | Facility: CLINIC | Age: 30
End: 2020-01-14
Payer: COMMERCIAL

## 2020-01-14 ENCOUNTER — HOSPITAL ENCOUNTER (OUTPATIENT)
Dept: ULTRASOUND IMAGING | Facility: CLINIC | Age: 30
Discharge: HOME | End: 2020-01-14
Attending: OBSTETRICS & GYNECOLOGY | Admitting: OBSTETRICS & GYNECOLOGY
Payer: COMMERCIAL

## 2020-01-14 ENCOUNTER — OFFICE VISIT (OUTPATIENT)
Dept: MATERNAL FETAL MEDICINE | Facility: CLINIC | Age: 30
End: 2020-01-14
Attending: OBSTETRICS & GYNECOLOGY
Payer: COMMERCIAL

## 2020-01-14 VITALS
TEMPERATURE: 97.4 F | HEART RATE: 89 BPM | WEIGHT: 176 LBS | SYSTOLIC BLOOD PRESSURE: 118 MMHG | BODY MASS INDEX: 29.29 KG/M2 | DIASTOLIC BLOOD PRESSURE: 74 MMHG

## 2020-01-14 DIAGNOSIS — O09.92 HIGH-RISK PREGNANCY IN SECOND TRIMESTER: Primary | ICD-10-CM

## 2020-01-14 DIAGNOSIS — O34.32 CERVICAL INSUFFICIENCY DURING PREGNANCY IN SECOND TRIMESTER, ANTEPARTUM: Primary | ICD-10-CM

## 2020-01-14 DIAGNOSIS — O34.32 CERVICAL CERCLAGE SUTURE PRESENT IN SECOND TRIMESTER: ICD-10-CM

## 2020-01-14 DIAGNOSIS — O26.879 SHORT CERVIX AFFECTING PREGNANCY: ICD-10-CM

## 2020-01-14 DIAGNOSIS — O26.872 SHORT CERVICAL LENGTH DURING PREGNANCY IN SECOND TRIMESTER: ICD-10-CM

## 2020-01-14 PROBLEM — O34.30 CERVICAL CERCLAGE SUTURE PRESENT: Status: ACTIVE | Noted: 2020-01-14

## 2020-01-14 PROCEDURE — 76817 TRANSVAGINAL US OBSTETRIC: CPT

## 2020-01-14 PROCEDURE — 99207 ZZC PRENATAL VISIT: CPT | Performed by: OBSTETRICS & GYNECOLOGY

## 2020-01-14 NOTE — PROGRESS NOTES
21w0d  Previous CNM patient who had exam indicated cerclage on 1/7/2020 due to cervical funneling on FAS.  Has been recommended to do vaginal progesterone as well.  No issues with that.  Just had follow-up US with MFM and per patient, results were reassuring.  Has follow-up US to complete survey, but no other cervical follow-up.  Reviewed MD group and call schedule.  Discussed resident involvement--she's undecided at this point.  Revisit discussion at next visit.  RTC 4w for GCT and PNV.  Brenda Greer MD

## 2020-01-16 NOTE — PROGRESS NOTES
"Please see \"Imaging\" tab under \"Chart Review\" for details of today's visit.    Ronny Rosario    "

## 2020-01-21 ENCOUNTER — HOSPITAL ENCOUNTER (OUTPATIENT)
Facility: CLINIC | Age: 30
End: 2020-01-21
Admitting: OBSTETRICS & GYNECOLOGY
Payer: COMMERCIAL

## 2020-01-29 ENCOUNTER — HOSPITAL ENCOUNTER (OUTPATIENT)
Dept: ULTRASOUND IMAGING | Facility: CLINIC | Age: 30
Discharge: HOME | End: 2020-01-29
Attending: OBSTETRICS & GYNECOLOGY | Admitting: OBSTETRICS & GYNECOLOGY
Payer: COMMERCIAL

## 2020-01-29 ENCOUNTER — OFFICE VISIT (OUTPATIENT)
Dept: MATERNAL FETAL MEDICINE | Facility: CLINIC | Age: 30
End: 2020-01-29
Attending: OBSTETRICS & GYNECOLOGY
Payer: COMMERCIAL

## 2020-01-29 DIAGNOSIS — O34.32 CERVICAL INSUFFICIENCY DURING PREGNANCY IN SECOND TRIMESTER, ANTEPARTUM: Primary | ICD-10-CM

## 2020-01-29 DIAGNOSIS — O35.9XX0 SUSPECTED FETAL ABNORMALITY AFFECTING MANAGEMENT OF MOTHER, ANTEPARTUM, SINGLE OR UNSPECIFIED FETUS: ICD-10-CM

## 2020-01-29 PROCEDURE — 76816 OB US FOLLOW-UP PER FETUS: CPT

## 2020-01-29 NOTE — PROGRESS NOTES
Please refer to ultrasound report under 'Imaging' Studies of 'Chart Review' tabs.    Zane Lassiter M.D.

## 2020-02-11 ENCOUNTER — TELEPHONE (OUTPATIENT)
Dept: OBGYN | Facility: CLINIC | Age: 30
End: 2020-02-11

## 2020-02-11 ENCOUNTER — PRENATAL OFFICE VISIT (OUTPATIENT)
Dept: OBGYN | Facility: CLINIC | Age: 30
End: 2020-02-11
Payer: COMMERCIAL

## 2020-02-11 VITALS
SYSTOLIC BLOOD PRESSURE: 132 MMHG | WEIGHT: 186 LBS | TEMPERATURE: 96.6 F | HEART RATE: 86 BPM | DIASTOLIC BLOOD PRESSURE: 85 MMHG | BODY MASS INDEX: 30.95 KG/M2

## 2020-02-11 DIAGNOSIS — O09.92 HIGH-RISK PREGNANCY IN SECOND TRIMESTER: Primary | ICD-10-CM

## 2020-02-11 DIAGNOSIS — O34.32 CERVICAL CERCLAGE SUTURE PRESENT IN SECOND TRIMESTER: ICD-10-CM

## 2020-02-11 DIAGNOSIS — R73.09 ABNORMAL GLUCOSE: ICD-10-CM

## 2020-02-11 LAB
GLUCOSE 1H P 50 G GLC PO SERPL-MCNC: 140 MG/DL (ref 60–129)
HGB BLD-MCNC: 12.6 G/DL (ref 11.7–15.7)

## 2020-02-11 PROCEDURE — 36415 COLL VENOUS BLD VENIPUNCTURE: CPT | Performed by: OBSTETRICS & GYNECOLOGY

## 2020-02-11 PROCEDURE — 99207 ZZC PRENATAL VISIT: CPT | Performed by: OBSTETRICS & GYNECOLOGY

## 2020-02-11 PROCEDURE — 00000218 ZZHCL STATISTIC OBHBG - HEMOGLOBIN: Performed by: OBSTETRICS & GYNECOLOGY

## 2020-02-11 PROCEDURE — 82950 GLUCOSE TEST: CPT | Performed by: OBSTETRICS & GYNECOLOGY

## 2020-02-11 NOTE — PROGRESS NOTES
25w0d  Doing well since last visit.  Feeling FM, but somewhat blunted due to anterior placenta.  Cervix appeared like it may be dilated on last ultrasound, but speculum exam was performed at the time the cervix visually closed.  Has no further ultrasound scheduled.  No bleeding or leaking fluid.  Reviewed weight gain, about 3 pounds ahead.  Discussed as weather warms up and she gets further along in pregnancy, will be able to be slightly more active.  Discussed cerclage removal at 36w, either in the clinic or in triage.  Has double Damian.  GCT today.  RTC 4w.  Brenda Greer MD

## 2020-02-17 DIAGNOSIS — O09.92 HIGH-RISK PREGNANCY IN SECOND TRIMESTER: ICD-10-CM

## 2020-02-17 DIAGNOSIS — R73.09 ABNORMAL GLUCOSE: ICD-10-CM

## 2020-02-17 PROCEDURE — 36415 COLL VENOUS BLD VENIPUNCTURE: CPT | Performed by: OBSTETRICS & GYNECOLOGY

## 2020-02-17 PROCEDURE — 82952 GTT-ADDED SAMPLES: CPT | Performed by: OBSTETRICS & GYNECOLOGY

## 2020-02-17 PROCEDURE — 82951 GLUCOSE TOLERANCE TEST (GTT): CPT | Performed by: OBSTETRICS & GYNECOLOGY

## 2020-02-18 LAB
GLUCOSE 1H P 100 G GLC PO SERPL-MCNC: 125 MG/DL (ref 60–179)
GLUCOSE 2H P 100 G GLC PO SERPL-MCNC: 135 MG/DL (ref 60–154)
GLUCOSE 3H P 100 G GLC PO SERPL-MCNC: 110 MG/DL (ref 60–139)
GLUCOSE P FAST SERPL-MCNC: 81 MG/DL (ref 60–94)

## 2020-03-10 ENCOUNTER — PRENATAL OFFICE VISIT (OUTPATIENT)
Dept: OBGYN | Facility: CLINIC | Age: 30
End: 2020-03-10
Payer: COMMERCIAL

## 2020-03-10 VITALS
TEMPERATURE: 97.8 F | BODY MASS INDEX: 31.45 KG/M2 | DIASTOLIC BLOOD PRESSURE: 80 MMHG | HEART RATE: 87 BPM | SYSTOLIC BLOOD PRESSURE: 114 MMHG | WEIGHT: 189 LBS

## 2020-03-10 DIAGNOSIS — O09.93 HIGH-RISK PREGNANCY IN THIRD TRIMESTER: Primary | ICD-10-CM

## 2020-03-10 PROCEDURE — 99207 ZZC PRENATAL VISIT: CPT | Performed by: OBSTETRICS & GYNECOLOGY

## 2020-03-10 NOTE — PROGRESS NOTES
29w0d  Doing well since last visit.  No unusual cramping, bleeding, or leaking fluid.  Baby active.  Wondering if she will be able to go to work when she nears 34 weeks.  Is currently on light duty, but that is limited to a total of 12 weeks.  Usually does 12-hour shifts.  Discussed trying to return at the end of her 12 weeks, limiting to 8-hour shifts.  Encouraged adequate hydration and monitoring of symptoms.  If she were to become very crampy, have abnormal discharge or vaginal bleeding, would reconsider.  We will send in work restriction form for us to fill out.  RTC 2w  Brenda Greer MD

## 2020-03-22 ENCOUNTER — RESULTS ONLY (OUTPATIENT)
Dept: LAB | Age: 30
End: 2020-03-22

## 2020-03-22 ENCOUNTER — OFFICE VISIT (OUTPATIENT)
Dept: URGENT CARE | Facility: URGENT CARE | Age: 30
End: 2020-03-22
Payer: COMMERCIAL

## 2020-03-22 ENCOUNTER — MYC MEDICAL ADVICE (OUTPATIENT)
Dept: OBGYN | Facility: CLINIC | Age: 30
End: 2020-03-22

## 2020-03-22 DIAGNOSIS — Z20.822 SUSPECTED COVID-19 VIRUS INFECTION: Primary | ICD-10-CM

## 2020-03-22 NOTE — PATIENT INSTRUCTIONS
Please use the information at the end of this document to sign up for [facility] where you can get your results and a message about those results sent to you through the Yext application. If you do not have mychart we will call you with your results but it may take longer.    Regardless of if you have been tested or not:  Patient who have symptoms (cough, fever, or shortness of breath), need to isolate for 7 days from when symptoms started OR 72 hours after fever resolves (without fever reducing medications) AND improvement of respiratory symptoms (whichever is longer).      Isolate yourself at home (in own room/own bathroom if possible)    Do Not allow any visitors    Do Not go to work or school    Do Not go to Judaism,  centers, shopping, or other public places.    Do Not shake hands.    Avoid close and intimate contact with others (hugging, kissing).    Follow CDC recommendations for household cleaning of frequently touched services.     After the initial 7 days, continue to isolate yourself from household members as much as possible. To continue decrease the risk of community spread and exposure, you and any members of your household should limit activities in public for 14 days after starting home isolation.     You can reference the following CDC link for helpful home isolation/care tips:  https://www.cdc.gov/coronavirus/2019-ncov/downloads/10Things.pdf    Protect Others:    Cover Your Mouth and Nose with a mask, disposable tissue or wash cloth to avoid spreading germs to others.    Wash your hands and face frequently with soap and water    Call Back If: Breathing difficulty develops or you become worse.    For more information about COVID19 and options for caring for yourself at home, please visit the CDC website at https://www.cdc.gov/coronavirus/2019-ncov/about/steps-when-sick.html  For more options for care at [facility], please visit our website at  [email or web address]

## 2020-03-23 LAB — COVID-19 VIRUS PCR TO MAYO - RESULT: ABNORMAL

## 2020-03-23 NOTE — TELEPHONE ENCOUNTER
"Technically patients are only candidates for phone visits if they are low risk.  Since this patient has a cerclage and due to cervical shortening she risks out of phone visits.  Please take her through the official clinic screening form to determine whether or not she is allowed to come in for a clinic visit.  If she is not allowed to come in for a clinic visit due to that algorithm, then we can do a phone visit for that reason, but we cannot automatically put a phone visit in for someone who is NOT a low risk pregnancy.     ThanksShira    Modified Low-Risk Prenatal Care   ? Women who are low-risk, that is those who have no medical, fetal or obstetric co-morbidities and no prior poor obstetrical outcome can be offered this modified schedule to reduce their risk of infection with COVID-19 after counseling on the risks and benefits.     ? Women who risk-out during the pregnancy or postpartum should be reverted back to routine prenatal care.   ? Women who call with urgent issues should be screened by phone per the system guidelines and offered an in-person evaluation if warranted.     ? Patient education should happen during virtual visits, if possible, so that in person visits can be kept brief.   ? During virtual visits patient should be asked questions, such as obstetric symptoms or symptoms of preeclampsia, so that in-person visits can be arranged, if indicated.  These visits must be documented.     ? The schedule can be tailored, as needed, to the individual patient.     ? Regarding protocols for virtual visits, COVID-19 screening, and triaging patients, please always refer to the [employer] intranet.   ? Please see the next page for the proposed schedule.  \"     "

## 2020-03-23 NOTE — TELEPHONE ENCOUNTER
Patient has already had GCT/hgb done, but upcoming note says to KEEP IN PERSON. I'm assuming okayt o switch to phone visit given symptoms, but wanted to verify. Please advise.  Nohemi Hidalgo RN

## 2020-03-24 ENCOUNTER — PRENATAL OFFICE VISIT (OUTPATIENT)
Dept: OBGYN | Facility: CLINIC | Age: 30
End: 2020-03-24
Payer: COMMERCIAL

## 2020-03-24 VITALS — WEIGHT: 187.4 LBS | BODY MASS INDEX: 31.18 KG/M2

## 2020-03-24 DIAGNOSIS — O34.32 CERVICAL CERCLAGE SUTURE PRESENT IN SECOND TRIMESTER: ICD-10-CM

## 2020-03-24 DIAGNOSIS — O09.93 HIGH-RISK PREGNANCY IN THIRD TRIMESTER: Primary | ICD-10-CM

## 2020-03-24 PROCEDURE — 99207 ZZC PRENATAL VISIT: CPT | Performed by: OBSTETRICS & GYNECOLOGY

## 2020-03-24 NOTE — PROGRESS NOTES
Phone visit due to COVID19.      Start time: 11:44  Stop time: 11:55    [facility]  31w0d    Pregnancy complicated by short cervix at 20 wks and a Damian cerclage in place.     CC: no concerns. Normal movement.  Has started to notice mild B-H ctx's. 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   Will be going back to bedside on 4/6/20.  discussed precautions and speaking to nurse manager about minimizing direct  patient contact with advancing pregnancy.  No formal guidelines recommending excusing pregnant HCW's  yet.  discussed timing of cerclage removal at ~ 36-37 weeks.   Discussed COVID pandemic and changes in scheduled clinic visits.   call clinic with concerns. RR

## 2020-03-26 ENCOUNTER — NURSE TRIAGE (OUTPATIENT)
Dept: NURSING | Facility: CLINIC | Age: 30
End: 2020-03-26

## 2020-03-26 NOTE — TELEPHONE ENCOUNTER
31 weeks pregnant and due date is 5/26/2020.  First child.  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  [facility] gave Chanel covid line telephone number.      Additional Information    Negative: Nursing judgment, per information in Reference    Negative: Information only call about a Well Adult (no illness or injury)    Negative: Nursing judgment or information in reference    Negative: Nursing judgment or information in reference    Negative: Nursing judgment or information in reference    Negative: Nursing judgment or information in reference    Negative: Nursing judgment or information in reference    Negative: Nursing judgment or information in reference    Negative: Nursing judgment or information in reference    Negative: Nursing judgment or information in reference    Negative: Nursing judgment or information in reference    Negative: Nursing judgment or information in reference    Negative: Nursing judgment or information in reference    Negative: Nursing judgment or information in reference    Negative: Nursing judgment or information in reference    Nursing judgment or information in reference    Protocols used: NO GUIDELINE [id number]

## 2020-03-27 LAB
SARS-COV-2 RNA SPEC QL NAA+PROBE: NOT DETECTED
SPECIMEN SOURCE: NORMAL

## 2020-04-07 ENCOUNTER — PRENATAL OFFICE VISIT (OUTPATIENT)
Dept: OBGYN | Facility: CLINIC | Age: 30
End: 2020-04-07
Payer: COMMERCIAL

## 2020-04-07 VITALS
SYSTOLIC BLOOD PRESSURE: 111 MMHG | DIASTOLIC BLOOD PRESSURE: 80 MMHG | HEIGHT: 65 IN | HEART RATE: 106 BPM | TEMPERATURE: 97.1 F | BODY MASS INDEX: 32.12 KG/M2 | WEIGHT: 192.8 LBS

## 2020-04-07 DIAGNOSIS — O09.93 HIGH-RISK PREGNANCY IN THIRD TRIMESTER: Primary | ICD-10-CM

## 2020-04-07 DIAGNOSIS — Z23 NEED FOR TDAP VACCINATION: ICD-10-CM

## 2020-04-07 DIAGNOSIS — Z34.81 ENCOUNTER FOR SUPERVISION OF OTHER NORMAL PREGNANCY, FIRST TRIMESTER: ICD-10-CM

## 2020-04-07 DIAGNOSIS — Z03.75 SUSPECTED SHORTENING OF CERVIX NOT FOUND: ICD-10-CM

## 2020-04-07 PROCEDURE — 90715 TDAP VACCINE 7 YRS/> IM: CPT | Performed by: OBSTETRICS & GYNECOLOGY

## 2020-04-07 PROCEDURE — 99207 ZZC PRENATAL VISIT: CPT | Performed by: OBSTETRICS & GYNECOLOGY

## 2020-04-07 PROCEDURE — 90471 IMMUNIZATION ADMIN: CPT | Performed by: OBSTETRICS & GYNECOLOGY

## 2020-04-07 ASSESSMENT — MIFFLIN-ST. JEOR: SCORE: 1605.42

## 2020-04-07 NOTE — PROGRESS NOTES
Doing well.   Good fetal movement.   Recommended daily iron in addition to PNV to avoid anemia during COVID pandemic.  Works in PICU, plans to stop working at 37 weeks, waiting to hear from [employer]. Has BH contractions now.   TDaP today, Rh pos.   Discussed modified prenatal care schedule due to COVID. Will do phone visit in 1-2 weeks, then cerclage removal at 36 weeks. Will do GBS, hgb, BSUS when there for cerclage removal. [facility] with [facility].

## 2020-04-07 NOTE — PROGRESS NOTES
Clinic Administered Medication Documentation      Injectable Medication Documentation    Patient was given Tdap. Prior to medication administration, verified patients identity using patient s name and date of birth. Please see MAR and medication order for additional information. Patient instructed to remain in clinic for 15 minutes and report any adverse reaction to staff immediately .      Was entire vial of medication used? Yes  Vial/Syringe: Syringe  Expiration Date:  4/12/2022  Was this medication supplied by the patient? No

## 2020-04-21 ENCOUNTER — PRENATAL OFFICE VISIT (OUTPATIENT)
Dept: OBGYN | Facility: CLINIC | Age: 30
End: 2020-04-21
Payer: COMMERCIAL

## 2020-04-21 DIAGNOSIS — O09.93 HIGH-RISK PREGNANCY IN THIRD TRIMESTER: Primary | ICD-10-CM

## 2020-04-21 DIAGNOSIS — O34.33 CERVICAL CERCLAGE SUTURE PRESENT IN THIRD TRIMESTER: ICD-10-CM

## 2020-04-21 PROCEDURE — 99207 ZZC PRENATAL VISIT: CPT | Performed by: OBSTETRICS & GYNECOLOGY

## 2020-04-21 NOTE — PROGRESS NOTES
Phone visit for modified prenatal care for COVID.  35w0d  Doing well.  Messaged about potential parvo exposure at work, but that patient ended up being negative.   Doing 8 hour shifts lately, which is going well.  Plans to continue until starting maternity leave on May 4.  Some BH contractions.  Not painful, just tightening at times.  Gets more back pain at work.  No vaginal bleeding or blood tinged discharge.  Baby active.  Has cerclage removal scheduled on L&D on 5/1 with Dr. Hooks.  Discussed that will be next visit, and may have one phone visit after that (depending on cervix after removal) then in person until delivery.    Should have GBS done with cerclage removal.  Brenda Greer MD    Duration of phone call:  13 minutes.

## 2020-04-21 NOTE — PROGRESS NOTES
"Chanel Vázquez is a 30 year old female who is being evaluated via a billable telephone visit.      The patient has been notified of following:     \"This telephone visit will be conducted via a call between you and your physician/provider. We have found that certain health care needs can be provided without the need for a physical exam.  This service lets us provide the care you need with a short phone conversation.  If a prescription is necessary we can send it directly to your pharmacy.  If lab work is needed we can place an order for that and you can then stop by our lab to have the test done at a later time.    Telephone visits are billed at different rates depending on your insurance coverage. During this emergency period, for some insurers they may be billed the same as an in-person visit.  Please reach out to your insurance provider with any questions.    If during the course of the call the physician/provider feels a telephone visit is not appropriate, you will not be charged for this service.\"    Patient has given verbal consent for Telephone visit?  Yes    How would you like to obtain your AVS? MyChart          "

## 2020-04-21 NOTE — PATIENT INSTRUCTIONS
The best way to prevent an infection is to avoid being exposed to the virus. We recommend that you wash your hands frequently and avoid touching your eyes, nose or mouth.  Practice social distancing by staying home as much as possible, avoiding gatherings and minimize trips for essentials. You should especially avoid any contact with people who are sick. It is possible that people who have the virus have little or no symptoms which is why social distancing is so important to avoid spreading the virus. Clean frequently touched surfaces daily. If you do start to have symptoms such as cough, fever or mild shortness of breath, you should stay home for at least 14 days.  If your symptoms are worrisome or severe or you are scheduled during this time to have a clinic visit you should call us at [phone].    Due to anticipated shortage of blood products we recommend all pregnant women take an iron supplement (ferrous gluconate or ferrous sulfate) in addition to a prenatal vitamin.     The hospital has strict visitor restrictions at this time for your safety. Please be aware that visitor restrictions are subject to change. You are allowed to have one healthy adult visitor during your hospital stay, it must be the same person the entire time and they must stay with you at the hospital the entire time (they are not allowed to come and go).     For information about Covid-19 and pregnancy we look to the center for disease control, the CDC. You can look at this information online at:   https://www.cdc.gov/coronavirus/2019-ncov/hcp/pregnant-women-faq.html

## 2020-04-27 ENCOUNTER — TELEPHONE (OUTPATIENT)
Dept: SCHEDULING | Facility: CLINIC | Age: 30
End: 2020-04-27

## 2020-04-27 ENCOUNTER — TELEPHONE (OUTPATIENT)
Dept: OBGYN | Facility: CLINIC | Age: 30
End: 2020-04-27

## 2020-04-27 DIAGNOSIS — O34.33 CERVICAL CERCLAGE SUTURE PRESENT IN THIRD TRIMESTER: Primary | ICD-10-CM

## 2020-04-28 ENCOUNTER — TELEPHONE (OUTPATIENT)
Dept: SCHEDULING | Facility: CLINIC | Age: 30
End: 2020-04-28

## 2020-05-01 ENCOUNTER — HOSPITAL ENCOUNTER (INPATIENT)
Facility: CLINIC | Age: 30
LOS: 5 days | Discharge: HOME HEALTH | End: 2020-05-06
Attending: OBSTETRICS & GYNECOLOGY | Admitting: OBSTETRICS & GYNECOLOGY
Payer: COMMERCIAL

## 2020-05-01 DIAGNOSIS — Z34.81 ENCOUNTER FOR SUPERVISION OF OTHER NORMAL PREGNANCY, FIRST TRIMESTER: Primary | ICD-10-CM

## 2020-05-01 PROBLEM — O60.00 PRETERM LABOR: Status: ACTIVE | Noted: 2020-05-01

## 2020-05-01 PROBLEM — Z36.89 ENCOUNTER FOR TRIAGE IN PREGNANT PATIENT: Status: ACTIVE | Noted: 2020-05-01

## 2020-05-01 LAB
ABO + RH BLD: NORMAL
ABO + RH BLD: NORMAL
ALT SERPL W P-5'-P-CCNC: 28 U/L (ref 0–50)
AST SERPL W P-5'-P-CCNC: 27 U/L (ref 0–45)
BLD GP AB SCN SERPL QL: NORMAL
BLOOD BANK CMNT PATIENT-IMP: NORMAL
CREAT SERPL-MCNC: 0.69 MG/DL (ref 0.52–1.04)
CREAT UR-MCNC: 45 MG/DL
ERYTHROCYTE [DISTWIDTH] IN BLOOD BY AUTOMATED COUNT: 12.4 % (ref 10–15)
GFR SERPL CREATININE-BSD FRML MDRD: >90 ML/MIN/{1.73_M2}
HCT VFR BLD AUTO: 37.3 % (ref 35–47)
HGB BLD-MCNC: 12.8 G/DL (ref 11.7–15.7)
MCH RBC QN AUTO: 31.4 PG (ref 26.5–33)
MCHC RBC AUTO-ENTMCNC: 34.3 G/DL (ref 31.5–36.5)
MCV RBC AUTO: 91 FL (ref 78–100)
PLATELET # BLD AUTO: 139 10E9/L (ref 150–450)
PROT UR-MCNC: 0.07 G/L
PROT/CREAT 24H UR: 0.14 G/G CR (ref 0–0.2)
RBC # BLD AUTO: 4.08 10E12/L (ref 3.8–5.2)
SARS-COV-2 PCR COMMENT: NORMAL
SARS-COV-2 RNA SPEC QL NAA+PROBE: NEGATIVE
SARS-COV-2 RNA SPEC QL NAA+PROBE: NORMAL
SPECIMEN EXP DATE BLD: NORMAL
SPECIMEN SOURCE: NORMAL
SPECIMEN SOURCE: NORMAL
WBC # BLD AUTO: 8.6 10E9/L (ref 4–11)

## 2020-05-01 PROCEDURE — 84156 ASSAY OF PROTEIN URINE: CPT | Performed by: STUDENT IN AN ORGANIZED HEALTH CARE EDUCATION/TRAINING PROGRAM

## 2020-05-01 PROCEDURE — 36415 COLL VENOUS BLD VENIPUNCTURE: CPT | Performed by: STUDENT IN AN ORGANIZED HEALTH CARE EDUCATION/TRAINING PROGRAM

## 2020-05-01 PROCEDURE — 12000001 ZZH R&B MED SURG/OB UMMC

## 2020-05-01 PROCEDURE — 86901 BLOOD TYPING SEROLOGIC RH(D): CPT | Performed by: STUDENT IN AN ORGANIZED HEALTH CARE EDUCATION/TRAINING PROGRAM

## 2020-05-01 PROCEDURE — 0UCC7ZZ EXTIRPATION OF MATTER FROM CERVIX, VIA NATURAL OR ARTIFICIAL OPENING: ICD-10-PCS | Performed by: OBSTETRICS & GYNECOLOGY

## 2020-05-01 PROCEDURE — 85027 COMPLETE CBC AUTOMATED: CPT | Performed by: STUDENT IN AN ORGANIZED HEALTH CARE EDUCATION/TRAINING PROGRAM

## 2020-05-01 PROCEDURE — 87635 SARS-COV-2 COVID-19 AMP PRB: CPT | Performed by: STUDENT IN AN ORGANIZED HEALTH CARE EDUCATION/TRAINING PROGRAM

## 2020-05-01 PROCEDURE — 96374 THER/PROPH/DIAG INJ IV PUSH: CPT

## 2020-05-01 PROCEDURE — 99213 OFFICE O/P EST LOW 20 MIN: CPT | Mod: GC | Performed by: OBSTETRICS & GYNECOLOGY

## 2020-05-01 PROCEDURE — 84460 ALANINE AMINO (ALT) (SGPT): CPT | Performed by: STUDENT IN AN ORGANIZED HEALTH CARE EDUCATION/TRAINING PROGRAM

## 2020-05-01 PROCEDURE — 87653 STREP B DNA AMP PROBE: CPT | Performed by: STUDENT IN AN ORGANIZED HEALTH CARE EDUCATION/TRAINING PROGRAM

## 2020-05-01 PROCEDURE — 25000128 H RX IP 250 OP 636: Performed by: STUDENT IN AN ORGANIZED HEALTH CARE EDUCATION/TRAINING PROGRAM

## 2020-05-01 PROCEDURE — 86850 RBC ANTIBODY SCREEN: CPT | Performed by: STUDENT IN AN ORGANIZED HEALTH CARE EDUCATION/TRAINING PROGRAM

## 2020-05-01 PROCEDURE — G0463 HOSPITAL OUTPT CLINIC VISIT: HCPCS | Mod: 25

## 2020-05-01 PROCEDURE — 86780 TREPONEMA PALLIDUM: CPT | Performed by: STUDENT IN AN ORGANIZED HEALTH CARE EDUCATION/TRAINING PROGRAM

## 2020-05-01 PROCEDURE — 82565 ASSAY OF CREATININE: CPT | Performed by: STUDENT IN AN ORGANIZED HEALTH CARE EDUCATION/TRAINING PROGRAM

## 2020-05-01 PROCEDURE — 59899 UNLISTED PX MAT CARE&DLVR: CPT

## 2020-05-01 PROCEDURE — 96372 THER/PROPH/DIAG INJ SC/IM: CPT

## 2020-05-01 PROCEDURE — 86900 BLOOD TYPING SEROLOGIC ABO: CPT | Performed by: STUDENT IN AN ORGANIZED HEALTH CARE EDUCATION/TRAINING PROGRAM

## 2020-05-01 PROCEDURE — 59025 FETAL NON-STRESS TEST: CPT

## 2020-05-01 PROCEDURE — 84450 TRANSFERASE (AST) (SGOT): CPT | Performed by: STUDENT IN AN ORGANIZED HEALTH CARE EDUCATION/TRAINING PROGRAM

## 2020-05-01 RX ORDER — BETAMETHASONE SODIUM PHOSPHATE AND BETAMETHASONE ACETATE 3; 3 MG/ML; MG/ML
12 INJECTION, SUSPENSION INTRA-ARTICULAR; INTRALESIONAL; INTRAMUSCULAR; SOFT TISSUE EVERY 24 HOURS
Status: COMPLETED | OUTPATIENT
Start: 2020-05-01 | End: 2020-05-02

## 2020-05-01 RX ORDER — FENTANYL CITRATE 50 UG/ML
50-100 INJECTION, SOLUTION INTRAMUSCULAR; INTRAVENOUS
Status: DISCONTINUED | OUTPATIENT
Start: 2020-05-01 | End: 2020-05-05

## 2020-05-01 RX ORDER — ONDANSETRON 2 MG/ML
4 INJECTION INTRAMUSCULAR; INTRAVENOUS EVERY 6 HOURS PRN
Status: DISCONTINUED | OUTPATIENT
Start: 2020-05-01 | End: 2020-05-05

## 2020-05-01 RX ORDER — OXYTOCIN/0.9 % SODIUM CHLORIDE 30/500 ML
100-340 PLASTIC BAG, INJECTION (ML) INTRAVENOUS CONTINUOUS PRN
Status: DISCONTINUED | OUTPATIENT
Start: 2020-05-01 | End: 2020-05-05

## 2020-05-01 RX ORDER — OXYTOCIN 10 [USP'U]/ML
10 INJECTION, SOLUTION INTRAMUSCULAR; INTRAVENOUS
Status: DISCONTINUED | OUTPATIENT
Start: 2020-05-01 | End: 2020-05-05

## 2020-05-01 RX ORDER — ACETAMINOPHEN 325 MG/1
650 TABLET ORAL EVERY 4 HOURS PRN
Status: DISCONTINUED | OUTPATIENT
Start: 2020-05-01 | End: 2020-05-05

## 2020-05-01 RX ORDER — FENTANYL CITRATE 50 UG/ML
100 INJECTION, SOLUTION INTRAMUSCULAR; INTRAVENOUS ONCE
Status: COMPLETED | OUTPATIENT
Start: 2020-05-01 | End: 2020-05-01

## 2020-05-01 RX ORDER — OXYCODONE AND ACETAMINOPHEN 5; 325 MG/1; MG/1
1 TABLET ORAL
Status: DISCONTINUED | OUTPATIENT
Start: 2020-05-01 | End: 2020-05-05

## 2020-05-01 RX ORDER — IBUPROFEN 800 MG/1
800 TABLET, FILM COATED ORAL
Status: COMPLETED | OUTPATIENT
Start: 2020-05-01 | End: 2020-05-05

## 2020-05-01 RX ORDER — SODIUM CHLORIDE, SODIUM LACTATE, POTASSIUM CHLORIDE, CALCIUM CHLORIDE 600; 310; 30; 20 MG/100ML; MG/100ML; MG/100ML; MG/100ML
INJECTION, SOLUTION INTRAVENOUS CONTINUOUS
Status: DISCONTINUED | OUTPATIENT
Start: 2020-05-01 | End: 2020-05-05

## 2020-05-01 RX ORDER — CARBOPROST TROMETHAMINE 250 UG/ML
250 INJECTION, SOLUTION INTRAMUSCULAR
Status: DISCONTINUED | OUTPATIENT
Start: 2020-05-01 | End: 2020-05-05

## 2020-05-01 RX ORDER — PENICILLIN G POTASSIUM 5000000 [IU]/1
5 INJECTION, POWDER, FOR SOLUTION INTRAMUSCULAR; INTRAVENOUS ONCE
Status: COMPLETED | OUTPATIENT
Start: 2020-05-01 | End: 2020-05-01

## 2020-05-01 RX ORDER — NALOXONE HYDROCHLORIDE 0.4 MG/ML
.1-.4 INJECTION, SOLUTION INTRAMUSCULAR; INTRAVENOUS; SUBCUTANEOUS
Status: DISCONTINUED | OUTPATIENT
Start: 2020-05-01 | End: 2020-05-04

## 2020-05-01 RX ORDER — METHYLERGONOVINE MALEATE 0.2 MG/ML
200 INJECTION INTRAVENOUS
Status: DISCONTINUED | OUTPATIENT
Start: 2020-05-01 | End: 2020-05-05

## 2020-05-01 RX ADMIN — FENTANYL CITRATE 100 MCG: 0.05 INJECTION, SOLUTION INTRAMUSCULAR; INTRAVENOUS at 12:55

## 2020-05-01 RX ADMIN — PENICILLIN G POTASSIUM 5 MILLION UNITS: 5000000 POWDER, FOR SOLUTION INTRAMUSCULAR; INTRAPLEURAL; INTRATHECAL; INTRAVENOUS at 19:55

## 2020-05-01 RX ADMIN — BETAMETHASONE SODIUM PHOSPHATE AND BETAMETHASONE ACETATE 12 MG: 3; 3 INJECTION, SUSPENSION INTRA-ARTICULAR; INTRALESIONAL; INTRAMUSCULAR at 14:20

## 2020-05-01 NOTE — PLAN OF CARE
Dr. Solares in to assess cervix, cervix is now 6 cms. Pt is eloy every 2-5 minutes, Chanel states they are not getting stronger or even uncomfortable, but just regular. Baby with moderate variability and accels. Plan is to transfer pt to room 458 and continue to monitor overnight.and to start penicillin for unknown GBS status when patient starts to feel uncomfortable or there is more cervical change. Will continue to monitor.

## 2020-05-01 NOTE — H&P
[facility]  History and physical     CC:       Cerclage removal     HPI:      Ms. Chanel Vázquez is a 30 year old  at 36w3d by LMP c/w 10w4d US who presents for scheduled cerclage removal. She denies contractions, leaking fluid, vaginal bleeding.  + Good fetal movement. Has been feeling well, started maternity leave yesterday. Denies HA, vision changes, CP, difficulty breathing, abdominal pain.     Obstetric Complications  -Cervical insufficiency with Damian cerclage placed at 20w1d  -Failed GCT, passed GTT    Past Medical History  History reviewed. No pertinent past medical history.    Past Surgical History  Past Surgical History:   Procedure Laterality Date     AS REPAIR CRUCIATE LIGAMENT,KNEE       CERCLAGE CERVICAL N/A 2020    Procedure: CERCLAGE, CERVIX, VAGINAL APPROACH;  Surgeon: Ronny Rosario MD;  Location: [facility] L+[facility]     ELBOW SURGERY       Medications:  Medications Prior to Admission   Medication Sig Dispense Refill Last Dose     Prenatal Vit-Fe Fumarate-FA (PRENATAL MULTIVITAMIN W/IRON) 27-0.8 MG tablet Take 1 tablet by mouth daily   2020 at Unknown time     progesterone (PROMETRIUM) 200 MG capsule Place 1 capsule (200 mg) vaginally daily 30 capsule 3 2020 at Unknown time     Family History  Family History   Problem Relation Age of Onset     Depression Mother      Anxiety Disorder Mother      Asthma Father      Arthritis Father      Depression Brother      Anxiety Disorder Brother      Hyperlipidemia Brother      Diabetes Maternal Grandmother      Diabetes Maternal Grandfather      Heart Disease Maternal Grandfather      Diabetes Paternal Grandmother      Cancer Other         Paternal Grandfather     Social History: . Works as PICU RN. Nonsmoker    ROS: Complete ROS negative except as listed in HPI   O:  Patient Vitals for the past 6 hrs:   BP Temp Temp src Resp   20 1215 (!) 140/68 98.3  F (36.8  C) Oral 16   Gen:      Well-appearing, NAD  CV:        Well perfused  Pulm:    CTAB, no wheezes  Abd:      Soft, gravid, nontender  Ext:       No LE edema b/l    Procedure:  Written informed consent obtained prior to the procedure. After 100mcg of fentanyl given through the IV the patient was positioned supine with feet in stirrups. GBS was collected in the usual fashion. A sterile speculum was then placed in the vagina. It was difficult to visualize cervical os but after application of betadine both sutures were grasped, cut with scissors, and removed intact. Hemostasis was noted. SVE following cerclage removal /-2.     BSUS: Cephalic presentation     FHT:     , moderate phillip, pos accels, no decels  [place]:    Some irritability, following cerclage removal contractions ~3 in 10 minutes     Labs:  GBS pending  COVID pending   2020 12:34   Creatinine 0.69   GFR Estimate >90   GFR Estimate If Black >90   ALT 28   AST 27   Creatinine Urine 45   Protein Random Urine 0.07   Protein Total Urine g/gr Creatinine 0.14   WBC 8.6   Hemoglobin 12.8   Hematocrit 37.3   Platelet Count 139 (L)   RBC Count 4.08   MCV 91   MCH 31.4   MCHC 34.3   RDW 12.4          A/P:  Ms. Chanel Vázquez is a 30 year old  at 36w3d by LMP c/w 10w4d US who presents for scheduled cerclage removal. Following removal of cerclage, cervix noted to be 5cm dilated. Patient evaluated in triage for 2 additional hours and made change to /-2. Recommend admission for further monitoring, prolonged labor evaluation     #Cervical insufficiency with Damian cerclage placed at 20w1d  -Removed at beside without complication     #Advanced cervical dilation  #Rule out  labor  -Cervix changed from 5 to 6 cm following cerclage removal and patient eloy 3 in 10 minutes  -BMZ #1 given for fetal lung maturity. Repeat in 24 hours if still pregnancy  -GBS unknown but  status, given advanced cervical dilation will start PCN   -Patient planning epidural for pain management in  labor    #PNC/FWB:         - Rh pos, rubella immune, failed GCT, passed GTT  - GBS collected today, pending. Treating as above.  - BSUS today, cephalic presentation  - Continuous fetal monitoring    #Elevated BP  -Mild range BP noted on initial vitals in triage, new for patient. Asymptomatic.   -HELLP labs wnl, UPC 0.14     Staffed with Dr. Jessee Solares MD  Ob/Gyn PGY-2  May 1, 2020 8:47 PM    Physician Attestation   I, Brenda Hooks MD, saw this patient with the resident and agree with the resident/fellow's findings and plan of care as documented in the note.      I personally reviewed vital signs, medications, labs, imaging and exam and fetal tracing.    Key findings: 30 year old  at 36w3d here for cerclage removal. After removal cervix found to be 5cm dilated. Patient having non painful contractions. Cervix changed to 6cm dilated. Will admit for observation. Plan bmz for fetal lung maturity, PCN for GBS unknown. Category 1 fetal tracing.     Brenda Hooks MD  Date of Service (when I saw the patient): 20

## 2020-05-01 NOTE — PLAN OF CARE
"Chanel normally feels \"tightenings\" throughout the day and night, as she is waiting in triage she feels the \"tightenings\" are becoming regular but she is not feeling any discomfort or cramping.  [place] shows contractions every 2-6 minutes. Baby with moderate variability and accels. Plan is for Dr. Solares to recheck cervix when available.   "

## 2020-05-01 NOTE — PLAN OF CARE
[facility] for a [facility]cl[facility] removal. Chanel is afebrile. Initial /68 followed by 124/74 and 119/80.  She denies contractions, leaking of fluid, bleeding, headaches, nausea, vomiting, upper epigastric pain, or visual disturbances. Baby is active. Labs were drawn. IV started to give patient fentanyl before the cerclage removal. Cerclaged removed, patient tolerated the procedure well. Cervix after the removal is 5 cms! Plan is to give betamethasone and continue to monitor patient for two hours and then recheck cervix. Pt agrees with plan.

## 2020-05-02 ENCOUNTER — ANESTHESIA (OUTPATIENT)
Dept: OBGYN | Facility: CLINIC | Age: 30
End: 2020-05-02
Payer: COMMERCIAL

## 2020-05-02 ENCOUNTER — ANESTHESIA EVENT (OUTPATIENT)
Dept: OBGYN | Facility: CLINIC | Age: 30
End: 2020-05-02
Payer: COMMERCIAL

## 2020-05-02 LAB
GP B STREP DNA SPEC QL NAA+PROBE: NEGATIVE
SPECIMEN SOURCE: NORMAL
T PALLIDUM AB SER QL: NONREACTIVE

## 2020-05-02 PROCEDURE — 99231 SBSQ HOSP IP/OBS SF/LOW 25: CPT | Performed by: OBSTETRICS & GYNECOLOGY

## 2020-05-02 PROCEDURE — 12000001 ZZH R&B MED SURG/OB UMMC

## 2020-05-02 PROCEDURE — 25000128 H RX IP 250 OP 636: Performed by: STUDENT IN AN ORGANIZED HEALTH CARE EDUCATION/TRAINING PROGRAM

## 2020-05-02 PROCEDURE — 25800030 ZZH RX IP 258 OP 636: Performed by: STUDENT IN AN ORGANIZED HEALTH CARE EDUCATION/TRAINING PROGRAM

## 2020-05-02 RX ORDER — HYDROXYZINE HYDROCHLORIDE 50 MG/1
50-100 TABLET, FILM COATED ORAL
Status: DISCONTINUED | OUTPATIENT
Start: 2020-05-02 | End: 2020-05-05

## 2020-05-02 RX ADMIN — Medication 2.5 MILLION UNITS: at 08:01

## 2020-05-02 RX ADMIN — BETAMETHASONE SODIUM PHOSPHATE AND BETAMETHASONE ACETATE 12 MG: 3; 3 INJECTION, SUSPENSION INTRA-ARTICULAR; INTRALESIONAL; INTRAMUSCULAR at 14:38

## 2020-05-02 RX ADMIN — Medication 2.5 MILLION UNITS: at 04:07

## 2020-05-02 RX ADMIN — Medication 2.5 MILLION UNITS: at 00:00

## 2020-05-02 NOTE — PROVIDER NOTIFICATION
05/01/20 1944   Provider Notification   Provider Name/Title Dr. Solares   Method of Notification In Department   Notification Reason Other (Comment)   Plan to start PCN now per Dr. Solares.

## 2020-05-02 NOTE — PROVIDER NOTIFICATION
05/02/20 0915   Provider Notification   Provider Name/Title Dr. Mark   Method of Notification In Department   Notification Reason Status Update   RN informed Dr. Mark of x3 ctxs on last hour of monitoring, monitors removed for pt to shower. Plan per provider to admit patient. Per Dr. Mark pt can have a break from monitoring for a few hours.

## 2020-05-02 NOTE — PROGRESS NOTES
[facility] PROGRESS NOTE:   May 2, 2020   10:38 AM         SUBJECTIVE:   Patient c/o Nothing.    Contractions: intermittend  Leakage of fluid:  No  Vaginal bleeding:  No  Pain controlled:  Yes           OBJECTIVE:     Vitals:    20 2033 20 0000 20 0408 20 0740   BP: 124/59 119/68 110/72 113/77   Resp: 18 18 18 16   Temp: 98.6  F (37  C) 97.9  F (36.6  C) 97.9  F (36.6  C) 98.5  F (36.9  C)   TempSrc: Oral Oral Oral Oral         NST:  Fetal Heart Rate Tracing:   Baseline: 130  Variability: Moderate  Accels, no decels    Tocometer: q 10-15 minutes    Abdomen:  Gravid, NT  Cervix:   Deferred           LABS:   No results found for this or any previous visit (from the past 12 hour(s)).           ASSESSMENT:   30 year old  at 36w4d with advanced cervical dilation after cerclage removal.   Cervical change from 5-6 cm.   GBS neg  Receiving betamethasone course           PLAN:   Discussed with MFM at safety rounds this morning. They do not recommend discharge home.   Will continue BMZ course and re-evaluate tomorrow and prn today for labor symptoms.   Discontinue penicillin    Halley Wong MD

## 2020-05-02 NOTE — PROGRESS NOTES
[facility] PROGRESS NOTE:   May 2, 2020 3:42 AM         SUBJECTIVE:   Patient reports contractions have spaced out. Had a few sharp pains low in pelvis earlier in the evening but they were quick and did not feel like contractions. No leaking. No bleeding. Active fetal movement. Able to get some sleep on and off.             OBJECTIVE:     Vitals:    20 1545 20 1723 20 2033 20 0000   BP: 120/79 128/88 124/59 119/68   Resp: 16 16 18 18   Temp: 98.2  F (36.8  C) 98.5  F (36.9  C) 98.6  F (37  C) 97.9  F (36.6  C)   TempSrc: Oral Oral Oral Oral         NST:  Fetal Heart Rate Tracin bpm baseline, mod variability, + accels, no decels  Category 1    Tocometer: No contractions    Gen: alert, oriented, no distress  Abdomen:  Gravid, nontender  Cervix: Deferred         LABS:     Recent Results (from the past 12 hour(s))   ABO/Rh type and screen    Collection Time: 20  5:54 PM   Result Value Ref Range    ABO A     RH(D) Pos     Antibody Screen Neg     Test Valid Only At          [facility]    Specimen Expires 2020               ASSESSMENT / PLAN:   30 year old  at 36w4d admitted after cerclage removal for concern for  labor. Cervix changed from 5 to 6cm dilated.    Labor: expectant management  Fetal well being: Category 1 tracing. S/p 1 dose BMZ for fetal lung maturity  GBS: unknown, collected . Receiving PCN for proph.  Pain: expectant      Brenda Hooks MD

## 2020-05-02 NOTE — PLAN OF CARE
VSS. Afebrile. FHT- reactive (see flow sheets). Rare contractions. Denies pain. Slept well overnight. Will shower and stand for a while this morning before SVE.  May discharge home.

## 2020-05-02 NOTE — PROGRESS NOTES
Strip Review Note:    FHT Baseline 125, mod variability, pos accels, no decels  [place]: quiet    A/P  Category I, reactive FHT. continue current plan    Hanna Solares MD PGY2

## 2020-05-02 NOTE — PROGRESS NOTES
[facility]  Strip Review Note      Chanel Vázquez      MRN: [id number]   Age: 30 year old YOB: 1990   Date of Admission: 2020    Objective:  Patient Vitals for the past 24 hrs:   BP Temp Temp src Resp   20 0408 110/72 97.9  F (36.6  C) Oral 18   20 0000 119/68 97.9  F (36.6  C) Oral 18   20 2033 124/59 98.6  F (37  C) Oral 18   20 1723 128/88 98.5  F (36.9  C) Oral 16   20 1545 120/79 98.2  F (36.8  C) Oral 16   20 1245 119/80 -- -- 16   20 1230 124/74 -- -- 16   20 1215 (!) 140/68 98.3  F (36.8  C) Oral 16     FHT: , moderate variability, accels present, no decels  [place]: Uterine irritability without distinct contractions    Assessment/Plan:  Ms. Chanel Vázquez is a 30 year old  at 36w4d by LMP c/w 10w4d US who presents for scheduled cerclage removal. Following removal of cerclage, cervix noted to be 5cm dilated and since then, she made cervical change to 6cm. Patient is admitted for advanced cervical dilation.    Labor:  - Patient made cervical changes, but is not actively eloy.  - S/p BMZ #1 ( 1420)  - Pain: Patient planning epidural for pain management in labor  - Will continue with expectant management and wait till 2nd dose of BMZ.     FWB:  - Category I, reactive    GBS unknown:  GBS collected , pending. S/p PCN loading dose 1955.    Deedee Mark MD ([email or web address])  [facility] OBGYN PGY-2  Personal pager: [phone]  2020 7:21 AM

## 2020-05-03 PROCEDURE — 12000001 ZZH R&B MED SURG/OB UMMC

## 2020-05-03 PROCEDURE — 59025 FETAL NON-STRESS TEST: CPT | Mod: 26 | Performed by: OBSTETRICS & GYNECOLOGY

## 2020-05-03 PROCEDURE — 99231 SBSQ HOSP IP/OBS SF/LOW 25: CPT | Mod: 25 | Performed by: OBSTETRICS & GYNECOLOGY

## 2020-05-03 PROCEDURE — 25000132 ZZH RX MED GY IP 250 OP 250 PS 637: Performed by: OBSTETRICS & GYNECOLOGY

## 2020-05-03 RX ADMIN — HYDROXYZINE HYDROCHLORIDE 100 MG: 50 TABLET, FILM COATED ORAL at 21:49

## 2020-05-03 NOTE — PLAN OF CARE
Pt stable this evening. VSS. Intact, no vg bleeding. FHTs reactive, occ ctx on TID monitoring. SVE 6/90/-2 unchanged from yesterday. 2nd beta given today. No further concerns, will continue with plan of care.

## 2020-05-03 NOTE — PLAN OF CARE
D: Patient had occ contractions with irritability on the monitor although she is not feeling any tightening or pain. Otherwise comfortable and looking forward to moving to a big room for a change in scenery. P: Continue to monitor.

## 2020-05-03 NOTE — PROGRESS NOTES
S; feeling good, no c/o.  She denies cramping, ctx or pain.  Denies VB or LOF.  Good fm.    O: /72   Temp 97.7  F (36.5  C) (Oral)   Resp 16   LMP 08/20/2019     NST: 140, +accels, -decels, mod phillip, reactive  [place]: no ctx    Abd: Gravid, SNT  Ex: no calf tend  SVE: 5/70/-1    A/P: IUP at 36w5d with advanced cervical dilation after cerclage removal.   FWBR, cat 1 tracing   Cervix remains unchanged and no s/sx of labor   She will be 48hrs of BMTZ at 1430 today.  Discussed plan with GAUTAM Wilson and she recommended inpt stay until delivery with plan to induce at 37wks unless she labors prior.  Discussed with patient and  and they are in agreement with plan.      CHAGO ROGER MD

## 2020-05-03 NOTE — PLAN OF CARE
Pt doing well this shift. VSS. Pt states she has occ cramping when up moving around that resolves when she rests or lays down. Denies ROM and vaginal bleeding at this time. Plan to monitor TID for 1hr per Dr. Wong. If pt laboring or increase in discomfort will notify Dr. Wong to SVE. Will continue with plan of care.

## 2020-05-03 NOTE — PLAN OF CARE
VSS, afebrile. Pt resting comfortably overnight. Denies LOF and vaginal bleeding. States that she notices tightening/cramping when moving around and voiding, but the cramping is not keeping her awake overnight. Reports headache this morning, BP WNL, denies vision changes or RUQ pain, no edema or increased reflexes. Offered tylenol, pt declined at this time. Will continue to monitor closely.

## 2020-05-04 LAB
ABO + RH BLD: NORMAL
ABO + RH BLD: NORMAL
BLD GP AB SCN SERPL QL: NORMAL
BLOOD BANK CMNT PATIENT-IMP: NORMAL
ERYTHROCYTE [DISTWIDTH] IN BLOOD BY AUTOMATED COUNT: 12.7 % (ref 10–15)
HCT VFR BLD AUTO: 34.4 % (ref 35–47)
HGB BLD-MCNC: 11.7 G/DL (ref 11.7–15.7)
MCH RBC QN AUTO: 31.5 PG (ref 26.5–33)
MCHC RBC AUTO-ENTMCNC: 34 G/DL (ref 31.5–36.5)
MCV RBC AUTO: 93 FL (ref 78–100)
PLATELET # BLD AUTO: 125 10E9/L (ref 150–450)
RBC # BLD AUTO: 3.71 10E12/L (ref 3.8–5.2)
SPECIMEN EXP DATE BLD: NORMAL
WBC # BLD AUTO: 9.8 10E9/L (ref 4–11)

## 2020-05-04 PROCEDURE — 25000132 ZZH RX MED GY IP 250 OP 250 PS 637: Performed by: STUDENT IN AN ORGANIZED HEALTH CARE EDUCATION/TRAINING PROGRAM

## 2020-05-04 PROCEDURE — 25000125 ZZHC RX 250: Performed by: STUDENT IN AN ORGANIZED HEALTH CARE EDUCATION/TRAINING PROGRAM

## 2020-05-04 PROCEDURE — 25000128 H RX IP 250 OP 636: Performed by: STUDENT IN AN ORGANIZED HEALTH CARE EDUCATION/TRAINING PROGRAM

## 2020-05-04 PROCEDURE — 85027 COMPLETE CBC AUTOMATED: CPT | Performed by: STUDENT IN AN ORGANIZED HEALTH CARE EDUCATION/TRAINING PROGRAM

## 2020-05-04 PROCEDURE — 36415 COLL VENOUS BLD VENIPUNCTURE: CPT | Performed by: STUDENT IN AN ORGANIZED HEALTH CARE EDUCATION/TRAINING PROGRAM

## 2020-05-04 PROCEDURE — 12000001 ZZH R&B MED SURG/OB UMMC

## 2020-05-04 PROCEDURE — 86901 BLOOD TYPING SEROLOGIC RH(D): CPT | Performed by: STUDENT IN AN ORGANIZED HEALTH CARE EDUCATION/TRAINING PROGRAM

## 2020-05-04 PROCEDURE — 86900 BLOOD TYPING SEROLOGIC ABO: CPT | Performed by: STUDENT IN AN ORGANIZED HEALTH CARE EDUCATION/TRAINING PROGRAM

## 2020-05-04 PROCEDURE — 25800030 ZZH RX IP 258 OP 636: Performed by: STUDENT IN AN ORGANIZED HEALTH CARE EDUCATION/TRAINING PROGRAM

## 2020-05-04 PROCEDURE — 86850 RBC ANTIBODY SCREEN: CPT | Performed by: STUDENT IN AN ORGANIZED HEALTH CARE EDUCATION/TRAINING PROGRAM

## 2020-05-04 RX ORDER — NALBUPHINE HYDROCHLORIDE 20 MG/ML
2.5-5 INJECTION, SOLUTION INTRAMUSCULAR; INTRAVENOUS; SUBCUTANEOUS EVERY 6 HOURS PRN
Status: DISCONTINUED | OUTPATIENT
Start: 2020-05-04 | End: 2020-05-06 | Stop reason: HOSPADM

## 2020-05-04 RX ORDER — LIDOCAINE 40 MG/G
CREAM TOPICAL
Status: DISCONTINUED | OUTPATIENT
Start: 2020-05-04 | End: 2020-05-05

## 2020-05-04 RX ORDER — OXYTOCIN/0.9 % SODIUM CHLORIDE 30/500 ML
1-24 PLASTIC BAG, INJECTION (ML) INTRAVENOUS CONTINUOUS
Status: DISCONTINUED | OUTPATIENT
Start: 2020-05-05 | End: 2020-05-05

## 2020-05-04 RX ORDER — LIDOCAINE HYDROCHLORIDE AND EPINEPHRINE 15; 5 MG/ML; UG/ML
INJECTION, SOLUTION EPIDURAL PRN
Status: DISCONTINUED | OUTPATIENT
Start: 2020-05-04 | End: 2024-08-27

## 2020-05-04 RX ORDER — OXYTOCIN 10 [USP'U]/ML
INJECTION, SOLUTION INTRAMUSCULAR; INTRAVENOUS
Status: DISCONTINUED
Start: 2020-05-04 | End: 2020-05-05 | Stop reason: WASHOUT

## 2020-05-04 RX ORDER — EPHEDRINE SULFATE 50 MG/ML
5 INJECTION, SOLUTION INTRAMUSCULAR; INTRAVENOUS; SUBCUTANEOUS
Status: DISCONTINUED | OUTPATIENT
Start: 2020-05-04 | End: 2020-05-06 | Stop reason: HOSPADM

## 2020-05-04 RX ORDER — OXYTOCIN/0.9 % SODIUM CHLORIDE 30/500 ML
PLASTIC BAG, INJECTION (ML) INTRAVENOUS
Status: COMPLETED
Start: 2020-05-04 | End: 2020-05-05

## 2020-05-04 RX ORDER — FENTANYL/BUPIVACAINE/NS/PF 2-1250MCG
PLASTIC BAG, INJECTION (ML) INJECTION CONTINUOUS PRN
Status: DISCONTINUED | OUTPATIENT
Start: 2020-05-04 | End: 2024-08-27

## 2020-05-04 RX ORDER — NALOXONE HYDROCHLORIDE 0.4 MG/ML
.1-.4 INJECTION, SOLUTION INTRAMUSCULAR; INTRAVENOUS; SUBCUTANEOUS
Status: DISCONTINUED | OUTPATIENT
Start: 2020-05-04 | End: 2020-05-05

## 2020-05-04 RX ORDER — MISOPROSTOL 200 UG/1
TABLET ORAL
Status: COMPLETED
Start: 2020-05-04 | End: 2020-05-05

## 2020-05-04 RX ORDER — LIDOCAINE HYDROCHLORIDE 10 MG/ML
INJECTION, SOLUTION EPIDURAL; INFILTRATION; INTRACAUDAL; PERINEURAL
Status: DISCONTINUED
Start: 2020-05-04 | End: 2020-05-05 | Stop reason: WASHOUT

## 2020-05-04 RX ADMIN — SODIUM CHLORIDE, POTASSIUM CHLORIDE, SODIUM LACTATE AND CALCIUM CHLORIDE 1000 ML: 600; 310; 30; 20 INJECTION, SOLUTION INTRAVENOUS at 21:50

## 2020-05-04 RX ADMIN — Medication: at 21:51

## 2020-05-04 RX ADMIN — ACETAMINOPHEN 650 MG: 325 TABLET, FILM COATED ORAL at 06:12

## 2020-05-04 RX ADMIN — SODIUM CHLORIDE, POTASSIUM CHLORIDE, SODIUM LACTATE AND CALCIUM CHLORIDE: 600; 310; 30; 20 INJECTION, SOLUTION INTRAVENOUS at 22:27

## 2020-05-04 RX ADMIN — LIDOCAINE HYDROCHLORIDE,EPINEPHRINE BITARTRATE 3 ML: 15; .005 INJECTION, SOLUTION EPIDURAL; INFILTRATION; INTRACAUDAL; PERINEURAL at 22:06

## 2020-05-04 RX ADMIN — Medication 10 ML/HR: at 22:10

## 2020-05-04 NOTE — PLAN OF CARE
VSS, afebrile. Pt resting comfortably with vistaril overnight. Denies LOF or bleeding, and cramping/contractions. See flowsheets for uterine activity and FHR records. Pt reporting headache this morning upon waking up. Denies vision changes and RUQ pain, BP WNL. Tylenol administered. Will continue to monitor per protocol and plan for IOL at 37 weeks.

## 2020-05-04 NOTE — PLAN OF CARE
Pt without complaint. Afebrile. VSS. Pt denies LOF, vaginal bleeding. Reports rare contraction felt as tightening. Active fetal movement. EFM category 1. Plan for IOL starting at 0000.

## 2020-05-04 NOTE — PROGRESS NOTES
[facility] Labor and Delivery Progress Note    Chanel Vázquez MRN# [id number]   Age: 30 year old YOB: 1990           Subjective:   No complaints.  Nothing is happening.  Baby is moving fine.  Waiting for tomorrow to start IOL.  discussed induction process.  Will begin pitocin then AROM.  Patient hoping for an epidural.    Chart reviewed.  GBS negative.            Objective:     Patient Vitals for the past 24 hrs:   BP Temp Temp src Pulse Resp Oximeter Heart Rate   20 0608 113/69 98.1  F (36.7  C) Oral -- 16 55 bpm   20 2348 99/54 98.6  F (37  C) Oral -- 16 60 bpm   20 1730 125/68 98.4  F (36.9  C) Oral 68 -- --   20 1253 121/70 97.9  F (36.6  C) Oral -- 20 67 bpm         Cervical Exam: deferred    Membranes: Intact     Fetal Heart Rate:    Monitor: external US    Variability: moderate (amplitude range 6 to 25 bpm)    Baseline Rate: normal range    Fetal Heart Rate Tracin,  reactive NST    No contractions on toco.           Assessment:   Chanel Vázquez is a 30 year old  who is 36w6d here with advanced cervical dilation   S/p cerclage removal.   GBS negative           Plan:   Will begin pitocin at 37 wks and plan for AROM once she is eloy.   Plan for epidural for pain control when time comes.     Barbara Ordonez

## 2020-05-05 PROBLEM — Z34.90 PREGNANCY: Status: ACTIVE | Noted: 2020-05-05

## 2020-05-05 PROCEDURE — 25000125 ZZHC RX 250: Performed by: OBSTETRICS & GYNECOLOGY

## 2020-05-05 PROCEDURE — 25000132 ZZH RX MED GY IP 250 OP 250 PS 637: Performed by: STUDENT IN AN ORGANIZED HEALTH CARE EDUCATION/TRAINING PROGRAM

## 2020-05-05 PROCEDURE — 3E0R3BZ INTRODUCTION OF ANESTHETIC AGENT INTO SPINAL CANAL, PERCUTANEOUS APPROACH: ICD-10-PCS | Performed by: OBSTETRICS & GYNECOLOGY

## 2020-05-05 PROCEDURE — 25000132 ZZH RX MED GY IP 250 OP 250 PS 637

## 2020-05-05 PROCEDURE — 25000132 ZZH RX MED GY IP 250 OP 250 PS 637: Performed by: OBSTETRICS & GYNECOLOGY

## 2020-05-05 PROCEDURE — 25000125 ZZHC RX 250: Performed by: STUDENT IN AN ORGANIZED HEALTH CARE EDUCATION/TRAINING PROGRAM

## 2020-05-05 PROCEDURE — 12000001 ZZH R&B MED SURG/OB UMMC

## 2020-05-05 PROCEDURE — 72200001 ZZH LABOR CARE VAGINAL DELIVERY SINGLE

## 2020-05-05 PROCEDURE — 59400 OBSTETRICAL CARE: CPT | Mod: GC | Performed by: OBSTETRICS & GYNECOLOGY

## 2020-05-05 PROCEDURE — 25000125 ZZHC RX 250

## 2020-05-05 PROCEDURE — 10907ZC DRAINAGE OF AMNIOTIC FLUID, THERAPEUTIC FROM PRODUCTS OF CONCEPTION, VIA NATURAL OR ARTIFICIAL OPENING: ICD-10-PCS | Performed by: OBSTETRICS & GYNECOLOGY

## 2020-05-05 PROCEDURE — 00HU33Z INSERTION OF INFUSION DEVICE INTO SPINAL CANAL, PERCUTANEOUS APPROACH: ICD-10-PCS | Performed by: OBSTETRICS & GYNECOLOGY

## 2020-05-05 RX ORDER — NALOXONE HYDROCHLORIDE 0.4 MG/ML
.1-.4 INJECTION, SOLUTION INTRAMUSCULAR; INTRAVENOUS; SUBCUTANEOUS
Status: DISCONTINUED | OUTPATIENT
Start: 2020-05-05 | End: 2020-05-06 | Stop reason: HOSPADM

## 2020-05-05 RX ORDER — AMOXICILLIN 250 MG
2 CAPSULE ORAL 2 TIMES DAILY
Status: DISCONTINUED | OUTPATIENT
Start: 2020-05-05 | End: 2020-05-06 | Stop reason: HOSPADM

## 2020-05-05 RX ORDER — OXYTOCIN/0.9 % SODIUM CHLORIDE 30/500 ML
340 PLASTIC BAG, INJECTION (ML) INTRAVENOUS CONTINUOUS PRN
Status: DISCONTINUED | OUTPATIENT
Start: 2020-05-05 | End: 2020-05-06 | Stop reason: HOSPADM

## 2020-05-05 RX ORDER — CARBOPROST TROMETHAMINE 250 UG/ML
INJECTION, SOLUTION INTRAMUSCULAR
Status: DISCONTINUED
Start: 2020-05-05 | End: 2020-05-05 | Stop reason: HOSPADM

## 2020-05-05 RX ORDER — MODIFIED LANOLIN
OINTMENT (GRAM) TOPICAL
Status: DISCONTINUED | OUTPATIENT
Start: 2020-05-05 | End: 2020-05-06 | Stop reason: HOSPADM

## 2020-05-05 RX ORDER — IBUPROFEN 800 MG/1
800 TABLET, FILM COATED ORAL EVERY 6 HOURS PRN
Status: DISCONTINUED | OUTPATIENT
Start: 2020-05-05 | End: 2020-05-06 | Stop reason: HOSPADM

## 2020-05-05 RX ORDER — HYDROCORTISONE 2.5 %
CREAM (GRAM) TOPICAL 3 TIMES DAILY PRN
Status: DISCONTINUED | OUTPATIENT
Start: 2020-05-05 | End: 2020-05-06 | Stop reason: HOSPADM

## 2020-05-05 RX ORDER — OXYTOCIN 10 [USP'U]/ML
10 INJECTION, SOLUTION INTRAMUSCULAR; INTRAVENOUS
Status: DISCONTINUED | OUTPATIENT
Start: 2020-05-05 | End: 2020-05-06 | Stop reason: HOSPADM

## 2020-05-05 RX ORDER — BISACODYL 10 MG
10 SUPPOSITORY, RECTAL RECTAL DAILY PRN
Status: DISCONTINUED | OUTPATIENT
Start: 2020-05-07 | End: 2020-05-06 | Stop reason: HOSPADM

## 2020-05-05 RX ORDER — ACETAMINOPHEN 325 MG/1
650 TABLET ORAL EVERY 4 HOURS PRN
Status: DISCONTINUED | OUTPATIENT
Start: 2020-05-05 | End: 2020-05-06 | Stop reason: HOSPADM

## 2020-05-05 RX ORDER — CARBOPROST TROMETHAMINE 250 UG/ML
250 INJECTION, SOLUTION INTRAMUSCULAR ONCE
Status: COMPLETED | OUTPATIENT
Start: 2020-05-05 | End: 2020-05-05

## 2020-05-05 RX ORDER — MISOPROSTOL 200 UG/1
TABLET ORAL
Status: DISCONTINUED
Start: 2020-05-05 | End: 2020-05-05 | Stop reason: HOSPADM

## 2020-05-05 RX ORDER — AMOXICILLIN 250 MG
1 CAPSULE ORAL 2 TIMES DAILY
Status: DISCONTINUED | OUTPATIENT
Start: 2020-05-05 | End: 2020-05-06 | Stop reason: HOSPADM

## 2020-05-05 RX ORDER — OXYTOCIN/0.9 % SODIUM CHLORIDE 30/500 ML
100 PLASTIC BAG, INJECTION (ML) INTRAVENOUS CONTINUOUS
Status: DISCONTINUED | OUTPATIENT
Start: 2020-05-05 | End: 2020-05-06 | Stop reason: HOSPADM

## 2020-05-05 RX ADMIN — Medication 2 MILLI-UNITS/MIN: at 00:11

## 2020-05-05 RX ADMIN — IBUPROFEN 800 MG: 800 TABLET, FILM COATED ORAL at 23:51

## 2020-05-05 RX ADMIN — ACETAMINOPHEN 650 MG: 325 TABLET, FILM COATED ORAL at 23:51

## 2020-05-05 RX ADMIN — ACETAMINOPHEN 650 MG: 325 TABLET, FILM COATED ORAL at 20:08

## 2020-05-05 RX ADMIN — IBUPROFEN 800 MG: 800 TABLET, FILM COATED ORAL at 11:25

## 2020-05-05 RX ADMIN — MISOPROSTOL 800 MCG: 200 TABLET ORAL at 04:35

## 2020-05-05 RX ADMIN — CARBOPROST TROMETHAMINE 250 MCG: 250 INJECTION, SOLUTION INTRAMUSCULAR at 07:21

## 2020-05-05 RX ADMIN — IBUPROFEN 800 MG: 800 TABLET, FILM COATED ORAL at 05:09

## 2020-05-05 RX ADMIN — ACETAMINOPHEN 650 MG: 325 TABLET, FILM COATED ORAL at 10:33

## 2020-05-05 RX ADMIN — IBUPROFEN 800 MG: 800 TABLET, FILM COATED ORAL at 17:13

## 2020-05-05 RX ADMIN — ACETAMINOPHEN 650 MG: 325 TABLET, FILM COATED ORAL at 14:00

## 2020-05-05 RX ADMIN — Medication 100 ML/HR: at 07:24

## 2020-05-05 NOTE — ANESTHESIA PROCEDURE NOTES
Epidural Procedure Note  Staff -     Resident/Fellow: Aren Russell MD    Performed By: resident          Location: OB     Procedure start time:  5/4/2020 9:50 PM     Procedure end time:  5/4/2020 10:06 PM   Pre-procedure checklist:   patient identified, IV checked, site marked, risks and benefits discussed, informed consent, monitors and equipment checked, pre-op evaluation, at physician/surgeon's request and post-op pain management      Correct Patient: Yes      Correct Position: Yes      Correct Site: Yes      Correct Procedure: Yes      Correct Laterality:  Yes    Site Marked:  Yes  Procedure:     Procedure:  Epidural catheter    ASA:  2    Diagnosis:  Labor epidural    Position:  Sitting    Sterile Prep: chloraprep      Insertion site:  L3-4    Local skin infiltration:  1% lidocaine    Approach:  Midline    Needle gauge (G):  17    Needle Length (in):  3.5    Block Needle Type:  Neouhvern    Injection Technique:  LORT saline    SALVATORE at (cm):  6    Attempts:  1    Redirects:  1    Catheter gauge (G):  19    Catheter threaded easily: Yes      Threaded to cm at skin:  10    Paresthesias:  No    Aspiration negative for Heme or CSF: Yes      Test dose (mL):  3     Local anesthetic:  Lidocaine 1.5% w/ 1:200,000 epinephrine    Test dose time:  22:06    Test dose negative for signs of intravascular, subdural or intrathecal injection: Yes

## 2020-05-05 NOTE — L&D DELIVERY NOTE
OB Vaginal Delivery Note    Chanel Vázquez MRN# [id number]   Age: 30 year old YOB: 1990       L&D Delivery Note:     Chanel Vázquez is a 30 year old now  who presented at 36w3d for cervlage removal.  Pregnancy notable for advance cervical dilation and cerclage placed at 20 weeks. Cerclage was removed and cervix changed from 5 to 6 cm. Due to advanced dilation she was kept inpatient due to high risk of  delivery. She received a course of BMZ.  Once she was 37 weeks her cervix was reexamined and she was noted to be 9 cm. She was augmented with pitocin and AROM.  She progressed to complete and pushed for about 10 minutes, after which she had a spontaneous vaginal delivery of a viable female infant in SERINA position.  No nuchal cord was noted.  Apgars of 9 and 9 with weight of 2870 grams.  The cord was double clamped after 60 seconds and cut.  A cord segment and cord blood were obtained.  30U of IV pitocin was started.  800 mcg PV misoprostol given The placenta was then delivered using gentle traction and suprapubic pressure.  The uterus was noted to be firm after fundal massage.  The perineum was assessed for lacerations and small hemostatic first degree were noted.   On final examination of the perineum, the laceration was hemostatic without needing a stitch.   Total QBL was 398 ml.  The placenta appeared intact with a 3V umbilical cord.  Dr. Ordonez was present for the entire procedure.     Abi Patterson MD  Obstetrics & Gynecology, PGY-2  2020 5:46 AM      Physician Attestation   I was present for this uncomplicated vaginal delivery.  Patient with advanced cervical dilation after cerclage was removed.  Progressed to 9 cm slowly over 2-3 days. When she became 37 wks her contractions were augmented and she rapidly delivered without complication. I have reviewed and edited the above note and agree with details as noted above. Mom and baby stable following birth.       Barbara Ordonez MD   May 5, 2020               GA: 37w0d  GP:   Labor Complications: None   EBL:   mL  Delivery QBL: 398 mL  Delivery Type: Vaginal, Spontaneous   ROM to Delivery Time: (Delivered) Hours: 1 Minutes: 57   Weight:     1 Minute 5 Minute 10 Minute   Apgar Totals: 9   9        JAKE JIMENEZ;ARLEY ABRAMS;HARMEET MUNGUIA     Delivery Details:  Chanel Vázquez, a 30 year old  female delivered a viable infant with apgars of 9  and 9 . Patient was fully dilated and pushing after 6  hours 45  minutes in active labor. Delivery was via vaginal, spontaneous  to a sterile field under epidural  anesthesia. Infant delivered in vertex    occiput  anterior  position. Anterior and posterior shoulders delivered without difficulty. The cord was clamped, cut twice and 3 vessels  were noted. Cord blood was obtained in routine fashion with the following disposition: lab .      Cord complications: none   Placenta delivered at 2020  4:32 AM . Placental disposition was Pathology . Fundal massage performed and fundus found to be firm.     Episiotomy: none    Perineum, vagina, cervix were inspected, and the following lacerations were noted:   Perineal lacerations: 1st                  Excellent hemostasis was noted. Needle count correct. Infant and patient in delivery room in good and stable condition.        Labor Event Times    Labor onset date:  20 Onset time:   9:25 PM   Dilation complete date:  20 Complete time:   4:10 AM   Start pushing date/time:  2020 0417      Labor Length    1st Stage (hrs):  6 (min):  45   2nd Stage (hrs):  0 (min):  17   3rd Stage (hrs):  0 (min):  5      Labor Events     labor?:  No   steroids:  Full Course  Labor Type:  Spontaneous, Augmentation  Predominate monitoring during 1st stage:  continuous electronic fetal monitoring     Antibiotics received during labor?:  No     Rupture date/time: 20 0230   Rupture type:   Artificial Rupture of Membranes  Fluid color:  Clear, Pink  Fluid odor:  Normal     Augmentation:  Oxytocin, AROM  Augmentation date/time:  5/5/20 0011   Indications for augmentation:  Ineffective Contraction Pattern     Delivery/Placenta Date and Time    Delivery Date:  5/5/20 Delivery Time:   4:27 AM   Placenta Date/Time:  5/5/2020  4:32 AM  Oxytocin given at the time of delivery:  after delivery of baby     Vaginal Counts     Initial count performed by 2 team members:   Two Team Members   GINNY Armenta Dr.       Needles Suture [place] [employer]ong[employer] Instruments   Initial counts 2 0 5    Added to count 0 0 0    Final counts 2 0 5    Placed during labor Accounted for at the end of labor   No NA   No NA   No NA    Final count performed by 2 team members:   Two Team Members   GINNY Armenta Dr.      Final count correct?:  Yes     Apgars    Living status:  Living   1 Minute 5 Minute 10 Minute 15 Minute 20 Minute   Skin color: 1  1       Heart rate: 2  2       Reflex irritability: 2  2       Muscle tone: 2  2       Respiratory effort: 2  2       Total: 9  9       Apgars assigned by:  WILIAN MUNGUIA RN     Cord    Vessels:  3 Vessels Complications:  None   Cord Blood Disposition:  Lab Gases Sent?:  No      Labor Events and Shoulder Dystocia    Fetal Tracing Prior to Delivery:  Category 2  Fetal Tracing Comments:  variable decels with contractions in the second stage  Shoulder dystocia present?:  Neg     Delivery (Maternal) (Provider to Complete) (507802)    Episiotomy:  None  Perineal lacerations:  1st       Blood Loss  Mother: Chanel Vázquez #[id number]   Start of Mother's Information    IO Blood Loss  05/04/20 2125 - 05/05/20 0546    Delivery QBL (mL) Hospital Encounter 398 mL    Total  398 mL         End of Mother's Information  Mother: Chanel Vázquez #[id number]         Delivery - Provider to Complete (098011)    Delivering clinician:  Barbara Ordonez MD  Attempted Delivery Types (Choose all  that apply):  Spontaneous Vaginal Delivery  Delivery Type (Choose the 1 that will go to the Birth History):  Vaginal, Spontaneous   Other personnel:   Provider Role   Abi Patterson MD Resident   Chastity Shrestha RN Delivery Nurse   Emma Núñez RN Registered Nurse         Placenta    Delayed Cord Clamping:  Done  Date/Time:  5/5/2020  4:32 AM  Removal:  Spontaneous  Comments:  normal placenta, intact with 3VC  Disposition:  Pathology     Anesthesia    Method:  Epidural  Cervical dilation at placement:  8-10          Presentation and Position    Presentation:  Vertex   Occiput Anterior           Abi Patterson MD

## 2020-05-05 NOTE — PLAN OF CARE
"AF VSS, patient describing more contractions after finishing dinner, patient able to cope with contractions and describes them as a \"tightening\" vs painful at this time, Dr. Ordonez in room, [facility] 7/100/0, patient placed on monitor- see flowsheet, plan for epidural and pitocin at 0000, room prepared for delivery, anes resident contacted, patient amenable to POC  "

## 2020-05-05 NOTE — ANESTHESIA PREPROCEDURE EVALUATION
"Anesthesia Pre-Procedure Evaluation    Patient: Chanel Vázquez   MRN:     [id number] Gender:   female   Age:    30 year old :      1990        Preoperative Diagnosis: * No surgery found *        LABS:  CBC:   Lab Results   Component Value Date    WBC 9.8 2020    WBC 8.6 2020    HGB 11.7 2020    HGB 12.8 2020    HCT 34.4 (L) 2020    HCT 37.3 2020     (L) 2020     (L) 2020     BMP:   Lab Results   Component Value Date    CR 0.69 2020     COAGS: No results found for: PTT, INR, FIBR  POC:   Lab Results   Component Value Date    HCG Positive (A) 10/07/2019     OTHER:   Lab Results   Component Value Date    ALT 28 2020    AST 27 2020        Preop Vitals    BP Readings from Last 3 Encounters:   20 121/85   20 111/80   03/10/20 114/80    Pulse Readings from Last 3 Encounters:   20 69   20 106   03/10/20 87      Resp Readings from Last 3 Encounters:   20 16   20 16   02 18    SpO2 Readings from Last 3 Encounters:   20 95%      Temp Readings from Last 1 Encounters:   20 36.9  C (98.5  F) (Oral)    Ht Readings from Last 1 Encounters:   20 1.651 m (5' 5\")      Wt Readings from Last 1 Encounters:   20 87.5 kg (192 lb 12.8 oz)    Estimated body mass index is 32.08 kg/m  as calculated from the following:    Height as of 20: 1.651 m (5' 5\").    Weight as of 20: 87.5 kg (192 lb 12.8 oz).     LDA:  Peripheral IV 20 Right Lower forearm (Active)   Site Assessment WDL 20 1600   Line Status Saline locked 20 1600   Phlebitis Scale 0-->no symptoms 20 1600   Number of days: 0        History reviewed. No pertinent past medical history.   Past Surgical History:   Procedure Laterality Date     AS REPAIR CRUCIATE LIGAMENT,KNEE       CERCLAGE CERVICAL N/A 2020    Procedure: CERCLAGE, CERVIX, VAGINAL APPROACH;  Surgeon: Ronny Rosario MD;  " Location: [facility] L+D     ELBOW SURGERY        No Known Allergies     Anesthesia Evaluation     . Pt has had prior anesthetic. Type: Regional    No history of anesthetic complications          ROS/MED HX    ENT/Pulmonary:  - neg pulmonary ROS     Neurologic:  - neg neurologic ROS     Cardiovascular:  - neg cardiovascular ROS       METS/Exercise Tolerance:     Hematologic:  - neg hematologic  ROS       Musculoskeletal:  - neg musculoskeletal ROS       GI/Hepatic:  - neg GI/hepatic ROS       Renal/Genitourinary:  - ROS Renal section negative       Endo:  - neg endo ROS       Psychiatric:  - neg psychiatric ROS       Infectious Disease:  - neg infectious disease ROS       Malignancy:      - no malignancy   Other:    (+) Possibly pregnant                        PHYSICAL EXAM:   Mental Status/Neuro:    Airway: Facies: Feasible  Mallampati: II  Mouth/Opening: Full  TM distance: > 6 cm  Neck ROM: Full   Respiratory: Auscultation: CTAB     Resp. Rate: Normal     Resp. Effort: Normal      CV: Rhythm: Regular  Rate: Age appropriate  Heart: Normal Sounds  Edema: None   Comments:      Dental: Normal Dentition                Assessment:   ASA SCORE: 2            Plan:   Anes. Type:  Epidural     Epidural Details:  Catheter; Lumbar   Pre-Medication: None   Induction:  N/a   Airway: Native Airway   Access/Monitoring: PIV   Maintenance: N/a     Postop Plan:   Postop Pain: Regional  Postop Sedation/Airway: Not planned  Disposition: Inpatient/Admit     PONV Management: Adult Risk Factors: Female                   Aren Russell MD

## 2020-05-05 NOTE — PROGRESS NOTES
Patient comfortable with epidural  Cervix /0   AROM of clear fluid, minimal fluid as head is now firm against the cervix.   Pitocin at 6 mu/min  Ctx's 3-5 min  Expect .   Barbara Ordonez MD

## 2020-05-05 NOTE — PROVIDER NOTIFICATION
05/05/20 1000   Provider Notification   Provider Name/Title Dr. Patterson   Method of Notification Electronic Page   Request Evaluate-Remote   Notification Reason Other     Please discontinue labor orders when you have a chance.

## 2020-05-05 NOTE — PLAN OF CARE
of viable Female with Dr. Ordonez in attendance. Nursery RN WILIAN Núñez present. Infant with spontaneous cry to mothers abdomen, dried and stimulated. Placenta delivered without complications, pitocin started, 1st degree laceration, without repairs done. Delma cares provided. Mother and baby in stable condition.

## 2020-05-05 NOTE — PLAN OF CARE
Pt pad weights 33, then 29, no clots. Pain well controlled with ibuprofen. Up to void but no urge. Minimal blood loss in toilet. Transfer to . Bands checked and report given to Porsha ROBERTO RN who assumed care.

## 2020-05-05 NOTE — DISCHARGE SUMMARY
[facility] Discharge Summary    Chanel Vázquez MRN# [id number]   Age: 30 year old YOB: 1990     Date of Admission:  2020  Date of Discharge:  2020  Admitting Physician:  Barbara Ordonez MD  Discharge Physician:  Halley Wong MD    Admit Dx:   - Intrauterine pregnancy at 36w3d   -Cervical insufficiency with Damian cerclage placed at 20w1d  -Failed GCT, passed GTT    Discharge Dx:  - Same as above, s/p     Procedures:  - Spontaneous vaginal delivery  - Epidural analgesia    Admit HPI/Labor Course:  Chanel Vázquez is a 30 year old now  who presented at 36w3d for cervlage removal.  Pregnancy notable for advance cervical dilation and cerclage placed at 20 weeks. Cerclage was removed and cervix changed from 5 to 6 cm. Due to advanced dilation she was kept inpatient due to high risk of  delivery. She received a course of BMZ.  Once she was 37 weeks her cervix was reexamined and she was noted to be 9 cm. She was augmented with pitocin and AROM.  She progressed to complete and pushed for about 10 minutes, after which she had a spontaneous vaginal delivery of a viable female infant in SERINA position.  No nuchal cord was noted.  Apgars of 9 and 9 with weight of 2870 grams.  The cord was double clamped after 60 seconds and cut.  A cord segment and cord blood were obtained.  30U of IV pitocin was started.  800 mcg PV misoprostol given The placenta was then delivered using gentle traction and suprapubic pressure.  The uterus was noted to be firm after fundal massage.  The perineum was assessed for lacerations and small hemostatic first degree were noted.   On final examination of the perineum, the repair was noted to be hemostatic.  Total QBL was 398 ml.  The placenta appeared intact with a 3V umbilical cord.  Dr. Ordonez was present for the entire procedure.   Please see her Admission H&P and Delivery Summary for further details.    Postpartum  Course:  Her postpartum course was complicated by nothing. On PPD#1, she was meeting all of her postpartum goals and deemed stable for discharge. She was voiding without difficulty, tolerating a regular diet without nausea and vomiting, her pain was well controlled on oral pain medicines and her lochia was appropriate. Her hemoglobin prior to delivery was 12.8 and after delivery was 11.7. Her Rh status was positive, and Rhogam was not indicated.     Discharge Medications:   Chanel Vázquez   Home Medication Instructions ANDREE:[id number]    Printed on:05/06/20 9103   Medication Information                      acetaminophen (TYLENOL) 325 MG tablet  Take 2 tablets (650 mg) by mouth every 6 hours as needed for mild pain Start after Delivery.             ibuprofen (ADVIL/MOTRIN) 200 MG tablet  Take 3 tablets (600 mg) by mouth every 6 hours as needed for moderate pain Start after delivery             Prenatal Vit-Fe Fumarate-FA (PRENATAL MULTIVITAMIN W/IRON) 27-0.8 MG tablet  Take 1 tablet by mouth daily             senna-docusate (SENOKOT-S/PERICOLACE) 8.6-50 MG tablet  Take 1 tablet by mouth daily Start after delivery.                 Discharge/Disposition:  Chanel Vázquez was discharged to home in stable condition with the following instructions/medications:  1) Call for temperature > 100.4, bright red vaginal bleeding >1 pad an hour x 2 hours, foul smelling vaginal discharge, pain not controlled by usual oral pain meds, persistent nausea and vomiting not controlled on medications  2) She desired IUD postpartum for contraception.  3) For feeding she decided to breastfeed.  4) She was instructed to follow-up with her primary OB in 6 weeks for a routine postpartum visit.    Abi Patterson MD  Obstetrics & Gynecology, PGY-2  5/6/2020 1:31 PM    Physician Attestation     I, Halley Wong MD, personally examined and evaluated this patient.  I discussed the patient with the resident/fellow and care  team, and agree with the assessment and plan of care as documented in the note of 5/01/20.       I personally reviewed vital signs, medications, labs and exam.     Key findings: Doing well.   Precautions reviewed.      Halley Wong MD  Date of Service (when I saw the patient): 05/06/20

## 2020-05-05 NOTE — PLAN OF CARE
Patient arrived to [facility] unit via wheelchair at 0915,with belongings, accompanied by spouse/ significant other, with infant in arms. Received report from Preeti Cuevas RN and checked bands. Unit and room orientation completd. Call light given; no concerns present at this time. Continue with plan of care.

## 2020-05-05 NOTE — PROVIDER NOTIFICATION
05/04/20 2100   Provider Notification   Provider Name/Title Dr. Ordonez   Method of Notification In Department   Notification Reason Other (Comment)   Plan made with provider to switch patient from TID monitoring to continuous once patient gets out of bath

## 2020-05-05 NOTE — PROVIDER NOTIFICATION
05/05/20 0705   Provider Notification   Provider Name/Title Dr. Patterson   Method of Notification Electronic Page   Notification Reason Other   Fundus still firm at midline. Large clot with fundal check. 100g weighed bringing total QBL to 500mL. Provider paged

## 2020-05-05 NOTE — PROVIDER NOTIFICATION
05/05/20 0504   Provider Notification   Provider Name/Title Dr. Patterson   Method of Notification At Bedside   Request Evaluate in Person   Notification Reason Other   Fundal checks firm at midline but large clots still expressed. Provider at bedside to assess. Delivery QBL 200mL with another 200mL weighed in clots. Provider performed vaginal sweep. Plan to continue to closely monitor.

## 2020-05-05 NOTE — PROGRESS NOTES
F[facility] Progress Note  5/5/2020 2:11 AM      Pitocin started 00:11. Cvx last 9/90/0 with BBOW     bpm, mod phillip, accels present, no decels  Ctx 3/10 min. Palpate moderate    Plan for AROM with consistent strong ctx pattern    Abi Patterson MD  Obstetrics & Gynecology, PGY-2  5/5/2020 2:12 AM

## 2020-05-05 NOTE — PROVIDER NOTIFICATION
05/05/20 0413   Provider Notification   Provider Name/Title Dr. Ordonez & Dr. Patterson   Method of Notification Electronic Page   Request Attend Delivery   Pt feeling urge to push. Cervix 10/100/3. Providers paged for delivery.

## 2020-05-05 NOTE — PLAN OF CARE
VSS. Patient comfortable with epidural in place. See flowsheets FHT's. Plan to start pitocin. Anticipate .

## 2020-05-05 NOTE — PLAN OF CARE
Pt stable with no expressed clots and minimal bleeding since transfer to postpartum. Pt had several mildly elevated bp's but last check was 116/88.    Pt taking Ibuprofen and Tylenol for general aching discomfort.    Chanel is breastfeeding and hand expressing colostrum for her early term infant.

## 2020-05-05 NOTE — PROVIDER NOTIFICATION
05/04/20 2125   Provider Notification   Provider Name/Title Dr. Ordonez   Method of Notification In Department   Request Evaluate in Person   Notification Reason Pain   Patient feeling increased abdominal discomfort, monitors applied, Dr. Ordonez to bedside- SVE 9/100/0 w/ bulging bag, plan for epidural, bolus started, anes resident contacted

## 2020-05-05 NOTE — PROGRESS NOTES
Patient feeling more uncomfortable.   Cervix 9/100/BBW  Wants and epidural  Ok to get now   Then reassess.    Barbara Ordonez MD

## 2020-05-05 NOTE — PROGRESS NOTES
[facility] Labor and Delivery Progress Note    Chanel Vázquez MRN# [id number]   Age: 30 year old YOB: 1990           Subjective:   Feeling tightening in her abdomen.   No spotting or LOF.   Baby is moving well.            Objective:     Patient Vitals for the past 24 hrs:   BP Temp Temp src Pulse Resp Oximeter Heart Rate   20 1751 121/85 98.5  F (36.9  C) Oral -- 16 64 bpm   20 1345 112/70 98.5  F (36.9  C) Oral -- 16 85 bpm   20 0925 114/63 98.8  F (37.1  C) Oral 69 18 69 bpm   20 0608 113/69 98.1  F (36.7  C) Oral -- 16 55 bpm   20 2348 99/54 98.6  F (37  C) Oral -- 16 60 bpm         Cervical Exam: 7/100/0 can feel fetal sutures  Membranes: Intact     Fetal Heart Rate:    Monitor: external US    Variability: moderate (amplitude range 6 to 25 bpm)    Baseline Rate: normal range    Fetal Heart Rate Tracing: reassuring          Assessment:   Chanel Vázquez is a 30 year old  who is 36w6d here with advanced cervical dilation after cerclage was removed.   Patient is eloy spontaneously.           Plan:   Will monitor for cervical change and plan to begin pitocin after midnight.   Will plan for epidural prior to AROM.      Barbara Ordonez

## 2020-05-05 NOTE — PROVIDER NOTIFICATION
05/05/20 0710   Provider Notification   Provider Name/Title Dr. Munguia   Method of Notification In Department   Notification Reason Other   Per provider, please give hemabate for clots.

## 2020-05-06 VITALS
TEMPERATURE: 98 F | RESPIRATION RATE: 16 BRPM | HEART RATE: 80 BPM | DIASTOLIC BLOOD PRESSURE: 86 MMHG | SYSTOLIC BLOOD PRESSURE: 121 MMHG | OXYGEN SATURATION: 100 %

## 2020-05-06 LAB — HGB BLD-MCNC: 11.3 G/DL (ref 11.7–15.7)

## 2020-05-06 PROCEDURE — 25000132 ZZH RX MED GY IP 250 OP 250 PS 637: Performed by: OBSTETRICS & GYNECOLOGY

## 2020-05-06 PROCEDURE — 36415 COLL VENOUS BLD VENIPUNCTURE: CPT | Performed by: OBSTETRICS & GYNECOLOGY

## 2020-05-06 PROCEDURE — 85018 HEMOGLOBIN: CPT | Performed by: OBSTETRICS & GYNECOLOGY

## 2020-05-06 RX ORDER — IBUPROFEN 200 MG
600 TABLET ORAL EVERY 6 HOURS PRN
COMMUNITY
Start: 2020-05-06 | End: 2020-06-16

## 2020-05-06 RX ORDER — AMOXICILLIN 250 MG
1 CAPSULE ORAL DAILY
COMMUNITY
Start: 2020-05-06 | End: 2020-06-16

## 2020-05-06 RX ORDER — ACETAMINOPHEN 325 MG/1
650 TABLET ORAL EVERY 6 HOURS PRN
COMMUNITY
Start: 2020-05-06 | End: 2020-06-16

## 2020-05-06 RX ADMIN — ACETAMINOPHEN 650 MG: 325 TABLET, FILM COATED ORAL at 10:21

## 2020-05-06 RX ADMIN — ACETAMINOPHEN 650 MG: 325 TABLET, FILM COATED ORAL at 05:12

## 2020-05-06 RX ADMIN — SENNOSIDES AND DOCUSATE SODIUM 2 TABLET: 8.6; 5 TABLET ORAL at 08:07

## 2020-05-06 RX ADMIN — IBUPROFEN 800 MG: 800 TABLET, FILM COATED ORAL at 08:07

## 2020-05-06 NOTE — PLAN OF CARE
VSS. Fundus firm, midline at U/2. Lochia rubra and light. Voiding spontaneously, tolerating regular diet. Passing gas. Breastfeeding independently and hand expressing with some assistance (able to obtain 10 mL of expressed milk with help from writer). Reporting sore nipples - hydrogel given and per pt helps increase pain but nipples still feel sore from feeding. Lactation consult placed. EDS a 4. Birth certificate turned in. Cramping pain controlled with ibuprofen and tylenol. Bonding well with baby. Possible discharge today. Continue current plan of care.

## 2020-05-06 NOTE — LACTATION NOTE
This note was copied from a baby's chart.  Consult for: First time breastfeeding,early term infant.     Delivery Information: Baby Young was born at 37.0 weeks via vaginal delivery yesterday at 0427.    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 She does not have any significant health concerns that would affect lactation.     Maternal Breast Exam: Chanel noted breast growth in pregnancy. Her breasts are soft and symmetrical with bilateral reddened, intact, everted nipples. She has been able to hand express colostrum.     Infant information: Young has age appropriate output and her weight loss at 24 hours of age was -5.6% of her birthweight at 5lb 15.6oz.?    Oral exam of baby: Young has a palpable, thick, posterior lingual frenulum but is able to extend and elevate her tongue. She appears able to keep her tongue down and forward while sucking. She has a higher arched palate.     Feeding Assessment: Chanel used a nipple shield overnight due to discomfort with latch and Young being sleepy. She finds the football position most comfortable which is consistent with higher arched palate.  Chanel is able to position and latch Young in the football position without the shield and achieve a deep, comfortable latch. Young was sleepy and tired quickly so she attempted to re-latch with the nipple shield. Young sucked a few times but was disinterested in feeding.  Chanel has been hand expressing colostrum and spoon/cup feeding to Young. She is able to express 3-10 ml.  Chanel will call for support again at next feeding.      Education: potential feeding challenges of early term infant,  early feeding cues, benefits of feeding on cue, breastfeeding positions, signs breastfeeding is going well (comfortable latch, age appropriate output and weight loss, swallowing heard at the breast), satiety cues, expected  output,  weight loss, nutritive vs non-nutritive sucking, benefits of skin to skin,  the Second Night, benefits of breast massage and hand expression of colostrum, nipple shield use,inpatient lactation support and outpatient lactation resources.      Plan: Continue breastfeeding on cue with RN support as needed with a goal of 8-12 feedings per day. Encourage frequent skin to skin. Continue hand expression and supplement with ebm after each feeding due to early term infant. Chanel may initiate pumping 3-4 times per day for extra breast stimulation.  At discharge, family was encouraged to follow up with an outpatient lactation consultant. They were given the [facility] Breastfeeding Resource List.

## 2020-05-06 NOTE — PROGRESS NOTES
[facility] Obstetrics Postpartum Progress Note    , PPD#1,  @ 37w0d    S: Patient states she is doing well.  No headaches, vision changes, chest pain, shortness of breath, nausea, abdominal pain.  She has some pain at the epidural site and perineum improved with ibuprofen/acetaminophen, no significant cramping.  Lochia moderate.  Breastfeeding.  O:  Patient Vitals for the past 24 hrs:   BP Temp Temp src Pulse Heart Rate Resp SpO2   20 0806 121/86 98  F (36.7  C) -- 80 -- 16 --   20 2347 114/82 97.9  F (36.6  C) Oral 74 -- 16 --   20 1653 113/76 98.3  F (36.8  C) Oral 68 -- 14 --   20 1330 116/88 98.3  F (36.8  C) Oral -- 71 16 --   20 1130 125/81 -- -- -- 61 18 --   20 1030 -- -- -- -- -- -- 100 %   20 0915 (!) 127/91 98.4  F (36.9  C) Oral -- 78 18 99 %     Gen:  NAD  CV: regular rate and rhythm  Resp: CTAB, good air movement  Abd: soft, nondistended, nontender, fundus firm at 2cm below umbilicus  Ext: non-tender, trace edema, no erythema    Hemoglobin   Date Value Ref Range Status   2020 11.3 (L) 11.7 - 15.7 g/dL Final   2020 11.7 11.7 - 15.7 g/dL Final       A/P: 30 year old  PPD#1 s/p , doing well postpartum after pregnancy complicated by gHTN.    1. Heme: the patient had an admission Hgb of 11.7, a QBL of 400cc was associated with delivery.  PPD1 Hgb is 11.3, appropriate.    2. Gestational HTN: HELLP lans normal apart from platelets mildly decreased at 139k.  Blood pressures at goal.  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  3. Pain control: pain is well-controlled with Tylenol & Motrin, continue.  4. Rh positive  5. Rubella immune  6. Routine postpartum:    encourage breastfeeding, pump order placed    Encourage ambulation    Continue regular diet    Continue bowel regimen    Contraception: Mirena, discussed 8wk placement  7. Dispo: home tomorrow    Cheyanne Hairston MD  PGY-4 OB/GYN  OB G2 Pager [phone]  2020 9:13  AM      Physician Attestation   I, Halley Wong MD, personally examined and evaluated this patient.  I discussed the patient with the resident/fellow and care team, and agree with the assessment and plan of care as documented in the note of 5/01/20.      I personally reviewed vital signs, medications, labs and exam.    Key findings: Doing well.   Working on breastfeeding, nipples are flat so has some latch challenges.   Would like discharge later today if possible, has OTC meds at home.   Will let me know if breastfeeding is addressed today and ready to go home.  Precautions reviewed.     Halley Wong MD  Date of Service (when I saw the patient): 05/06/20

## 2020-05-06 NOTE — PLAN OF CARE
Data: Vital signs within normal limits. Postpartum checks within normal limits - see flow record. Patient eating and drinking normally. Patient able to empty bladder independently and is up ambulating. PIV saline locked. No apparent signs of infection. Patient showered this evening. Patient performing self cares and is able to care for infant. Breastfeeding baby with a nipple shield (baby was having difficulty with latching) as well as hand expressing colostrum for baby.  Action: Pain has been adequately managed with oral medications.  Response: Positive attachment behaviors observed with infant. Support person, : Valentino, theo.   Plan: Continue with the plan of care.

## 2020-05-06 NOTE — PLAN OF CARE
Patient is stable, breastfeeding on demand with shield and also hand expressing. Discharge to home today with baby. Discahrge education and instructions were reviewed and questions were addressed.

## 2020-05-06 NOTE — PROGRESS NOTES
Anesthesia Post-Partum Follow-Up Note After  with Epidural    Patient: Chanel Vázquez    Patient location: Post-partum floor.    Anesthesia type: Epidural    Subjective:     She does not complain of pruritis at this time. She denies weakness, denies paresthesia, denies difficulties breathing or voiding, denies nausea or vomiting, and denies headache. She is able to ambulate and tolerates regular diet. She did mention some soreness at the epidural insertion site.    Objective:    Respiratory Function (RR / SpO2 / Airway Patency): Satisfactory    Cardiac Function (HR / Rhythm / BP): Satisfactory    Site of epidural insertion: No signs of bruising, infection or inflammation.     Last Vitals: LMP 2019     Assessment and plan:   Chanel Vázquez is a 30 year old female  post-partum #1 s/p  . An epidural catheter was successfully inserted at the level of L3-4 without technical challenges. This successfully provided labor analgesia via PCEA pump infusing Bupivacaine 0.125% with Fentanyl 2mcg/mL. The patient delivered via  and the epidural catheter was removed immediately thereafter by the L&D RN.     At this time, there is no evidence of adverse side effects associated with the insertion or removal of the epidural catheter. If the patient develops new lower extremity paresis or paresthesias; or if there are concerns regarding the insertion site of the catheter, please reach out to the Dept of Anesthesia.    Thank you for including us in the care for this patient.    Yelena ROONEY  Anesthesia Resident, PGY2

## 2020-05-21 ENCOUNTER — AMBULATORY - HEALTHEAST (OUTPATIENT)
Dept: PEDIATRICS | Facility: CLINIC | Age: 30
End: 2020-05-21

## 2020-06-03 ENCOUNTER — COMMUNICATION - HEALTHEAST (OUTPATIENT)
Dept: ADMINISTRATIVE | Facility: CLINIC | Age: 30
End: 2020-06-03

## 2020-06-05 ENCOUNTER — MEDICAL CORRESPONDENCE (OUTPATIENT)
Dept: HEALTH INFORMATION MANAGEMENT | Facility: CLINIC | Age: 30
End: 2020-06-05

## 2020-06-16 ENCOUNTER — PRENATAL OFFICE VISIT (OUTPATIENT)
Dept: OBGYN | Facility: CLINIC | Age: 30
End: 2020-06-16
Payer: COMMERCIAL

## 2020-06-16 PROBLEM — O60.00 PRETERM LABOR: Status: RESOLVED | Noted: 2020-05-01 | Resolved: 2020-06-16

## 2020-06-16 PROBLEM — Z34.81 ENCOUNTER FOR SUPERVISION OF OTHER NORMAL PREGNANCY, FIRST TRIMESTER: Status: RESOLVED | Noted: 2019-12-06 | Resolved: 2020-06-16

## 2020-06-16 PROBLEM — Z36.89 ENCOUNTER FOR TRIAGE IN PREGNANT PATIENT: Status: RESOLVED | Noted: 2020-05-01 | Resolved: 2020-06-16

## 2020-06-16 PROBLEM — Z34.90 PREGNANCY: Status: RESOLVED | Noted: 2020-05-05 | Resolved: 2020-06-16

## 2020-06-16 PROBLEM — O34.30 CERVICAL CERCLAGE SUTURE PRESENT: Status: RESOLVED | Noted: 2020-01-14 | Resolved: 2020-06-16

## 2020-06-16 PROCEDURE — 99207 ZZC POST PARTUM EXAM: CPT | Performed by: OBSTETRICS & GYNECOLOGY

## 2020-06-16 ASSESSMENT — PATIENT HEALTH QUESTIONNAIRE - PHQ9: SUM OF ALL RESPONSES TO PHQ QUESTIONS 1-9: 0

## 2020-06-16 NOTE — PROGRESS NOTES
Chanel is here for a 6-week postpartum checkup.    She had a  of a viable girl, weight 6 pounds 5 oz., with no complications.  Since delivery, she has been breast feeding.  She has no signs of infection, bleeding or other complications.  She is not pregnant.  We discussed contraception and she has chosen Mirena IUD.    Mood is good.  Today's Depression Rating was   PHQ-9 SCORE 2020   PHQ-9 Total Score 0         ASSESSMENT:   Normal 6-week postpartum exam after .    PLAN:  Pap smear Due  and Mirena IUD for contraception.  Will make 15 min appointment for IUD insertion.  Phone visit due to COVID. Time 10 min

## 2020-07-08 ENCOUNTER — OFFICE VISIT (OUTPATIENT)
Dept: OBGYN | Facility: CLINIC | Age: 30
End: 2020-07-08
Payer: COMMERCIAL

## 2020-07-08 VITALS
SYSTOLIC BLOOD PRESSURE: 126 MMHG | BODY MASS INDEX: 28.46 KG/M2 | WEIGHT: 171 LBS | HEART RATE: 101 BPM | DIASTOLIC BLOOD PRESSURE: 85 MMHG | OXYGEN SATURATION: 98 %

## 2020-07-08 DIAGNOSIS — Z30.430 ENCOUNTER FOR IUD INSERTION: Primary | ICD-10-CM

## 2020-07-08 LAB — HCG UR QL: NEGATIVE

## 2020-07-08 PROCEDURE — 58300 INSERT INTRAUTERINE DEVICE: CPT | Performed by: OBSTETRICS & GYNECOLOGY

## 2020-07-08 PROCEDURE — 81025 URINE PREGNANCY TEST: CPT | Performed by: OBSTETRICS & GYNECOLOGY

## 2020-07-08 NOTE — PROGRESS NOTES
PROCEDURE: IUD insertion  Patient has verbalized understanding of risks and benefits. She was counseled on risks of infection, bleeding, uterine perforation, cervical laceration, expulsion, overall risk of pregnancy 2 in 1000, if she does get pregnant that there is an increased risk of ectopic pregnancy. All questions answered. She has signed the consent form.    Urine pregnancy test was negative and patient denied unprotected intercourse within the last 2 weeks.      A regular Vincent speculum was placed in the vagina with good visualization of the cervix.  The cervix was then swabbed with a betadine x3.  Tenaculum was placed at the 12 o'clock position on the cervix and the uterus sounded to 7.5cm.  The Mirena  IUD was then placed in the usual fashion under sterile technique without difficulty.  Strings were clipped about 2-3 cm from the cervical os.  Tenaculum was removed and cervix was hemostatic after application of silver nitrate. There were no complications. The patient tolerated the procedure well.    Bleeding pattern of this particular IUD was discussed with the patient. She is aware that the IUD will need to be removed in 5 years or PRN.  She is to return to clinic for her next annual or PRN.      Brenda Greer MD

## 2020-07-08 NOTE — NURSING NOTE
"Chief Complaint   Patient presents with     IUD       Initial /85   Pulse 101   Wt 77.6 kg (171 lb)   SpO2 98%   Breastfeeding Yes   BMI 28.46 kg/m   Estimated body mass index is 28.46 kg/m  as calculated from the following:    Height as of 20: 1.651 m (5' 5\").    Weight as of this encounter: 77.6 kg (171 lb).  BP completed using cuff size: regular    Questioned patient about current smoking habits.  Pt. has never smoked.          The following HM Due: NONE      The following patient reported/Care Every where data was sent to:  P ABSTRACT QUALITY INITIATIVES [89490]  none      n/a              "

## 2020-07-08 NOTE — PATIENT INSTRUCTIONS

## 2020-07-20 ENCOUNTER — MEDICAL CORRESPONDENCE (OUTPATIENT)
Dept: HEALTH INFORMATION MANAGEMENT | Facility: CLINIC | Age: 30
End: 2020-07-20

## 2020-08-08 ENCOUNTER — TELEPHONE (OUTPATIENT)
Dept: OBGYN | Facility: CLINIC | Age: 30
End: 2020-08-08

## 2020-08-08 ENCOUNTER — NURSE TRIAGE (OUTPATIENT)
Dept: NURSING | Facility: CLINIC | Age: 30
End: 2020-08-08

## 2020-08-08 DIAGNOSIS — N61.0 MASTITIS, ACUTE: Primary | ICD-10-CM

## 2020-08-08 RX ORDER — DICLOXACILLIN SODIUM 500 MG
500 CAPSULE ORAL 4 TIMES DAILY
Qty: 40 CAPSULE | Refills: 0 | Status: SHIPPED | OUTPATIENT
Start: 2020-08-08 | End: 2020-08-18

## 2020-08-08 NOTE — TELEPHONE ENCOUNTER
"Chanel calling. She is up [place] for the weekend in [place]. She has a fever, body aches, headache, and minimal breast symptoms but she requests that I page the on call. Chanel is an  [employer] employee.  Shwetha Whelan RN  [facility]      Reason for Disposition    Patient sounds very sick or weak to the triager    Additional Information    Negative: Chest pain    Negative: Breastfeeding questions about baby    Negative: Breastfeeding questions about mother (breast symptoms or feeling sick)    Negative: Breastfeeding questions about mother's medicines and diet    Negative: Postpartum breast pain and swelling, not breastfeeding    Negative: Small spot, skin growth or mole    Negative: [1] SEVERE breast pain AND [2] fever > 103 F (39.4 C)    Answer Assessment - Initial Assessment Questions  1. SYMPTOM: \"What's the main symptom you're concerned about?\"  (e.g., lump, pain, rash, nipple discharge)      Clogged ducts, open now, feels sore on the right side  2. LOCATION: \"Where is the soreness located?\"      Right breast  3. ONSET: \"When did clogs  start?\"      Has had them several  4. PRIOR HISTORY: \"Do you have any history of prior problems with your breasts?\" (e.g., lumps, cancer, fibrocystic breast disease)      Clogged ducts several times  5. CAUSE: \"What do you think is causing this symptom?\"      Not sure  6. OTHER SYMPTOMS: \"Do you have any other symptoms?\" (e.g., fever, breast pain, redness or rash, nipple discharge)      fever  7. PREGNANCY-BREASTFEEDING: \"Is there any chance you are pregnant?\" \"When was your last menstrual period?\" \"Are you breastfeeding?\"      Pumping, 3 Months postpartum    Protocols used: BREAST SYMPTOMS-A-AH      "

## 2020-08-09 NOTE — TELEPHONE ENCOUNTER
Received call from [facility] regarding patient call.  Chanel reports having troubles with clogged ducts in last month or so.  Has been massaging breasts to get clogs out recently.  Having some soreness/tenderness, as well as fever, body aches, etc.    Offered treatment for presumptive mastitis, as well as instructs to contact [email or web address] for any potential COVID testing, if appropriate.  Should seek medical attention if no improvement in 24 hours.    Brenda Greer MD

## 2020-09-10 ENCOUNTER — MEDICAL CORRESPONDENCE (OUTPATIENT)
Dept: HEALTH INFORMATION MANAGEMENT | Facility: CLINIC | Age: 30
End: 2020-09-10

## 2020-11-05 ENCOUNTER — MEDICAL CORRESPONDENCE (OUTPATIENT)
Dept: HEALTH INFORMATION MANAGEMENT | Facility: CLINIC | Age: 30
End: 2020-11-05

## 2021-04-21 ENCOUNTER — OFFICE VISIT (OUTPATIENT)
Dept: OPTOMETRY | Facility: CLINIC | Age: 31
End: 2021-04-21
Payer: COMMERCIAL

## 2021-04-21 DIAGNOSIS — R25.3 EYELID TWITCH: ICD-10-CM

## 2021-04-21 DIAGNOSIS — H52.223 REGULAR ASTIGMATISM OF BOTH EYES: Primary | ICD-10-CM

## 2021-04-21 PROCEDURE — 92014 COMPRE OPH EXAM EST PT 1/>: CPT | Performed by: OPTOMETRIST

## 2021-04-21 PROCEDURE — 92015 DETERMINE REFRACTIVE STATE: CPT | Performed by: OPTOMETRIST

## 2021-04-21 ASSESSMENT — REFRACTION_MANIFEST
OD_AXIS: 155
OS_AXIS: 016
OD_SPHERE: -1.00
OS_CYLINDER: +0.50
OD_CYLINDER: +0.25
OS_CYLINDER: +0.50
OS_SPHERE: -1.25
OS_AXIS: 015
OS_SPHERE: -1.25
OD_SPHERE: -1.00
OD_AXIS: 153
METHOD_AUTOREFRACTION: 1
OD_CYLINDER: +0.50

## 2021-04-21 ASSESSMENT — EXTERNAL EXAM - RIGHT EYE: OD_EXAM: NORMAL

## 2021-04-21 ASSESSMENT — REFRACTION_WEARINGRX
OS_SPHERE: -1.00
OD_AXIS: 155
OS_AXIS: 015
OD_CYLINDER: +0.50
OS_CYLINDER: +0.50
OD_SPHERE: -0.75
SPECS_TYPE: SVL

## 2021-04-21 ASSESSMENT — VISUAL ACUITY
OD_SC: 20/30
OS_SC: 20/20
OS_SC+: -2
OS_SC: 20/30
OD_SC: 20/20
METHOD: SNELLEN - LINEAR
OD_SC+: -2

## 2021-04-21 ASSESSMENT — TONOMETRY
OD_IOP_MMHG: 14
OS_IOP_MMHG: 14
IOP_METHOD: APPLANATION

## 2021-04-21 ASSESSMENT — CONF VISUAL FIELD
OD_NORMAL: 1
METHOD: COUNTING FINGERS
OS_NORMAL: 1

## 2021-04-21 ASSESSMENT — CUP TO DISC RATIO
OS_RATIO: 0.3
OD_RATIO: 0.4

## 2021-04-21 ASSESSMENT — EXTERNAL EXAM - LEFT EYE: OS_EXAM: NORMAL

## 2021-04-21 ASSESSMENT — KERATOMETRY
OD_AXISANGLE2_DEGREES: 162
OS_AXISANGLE2_DEGREES: 180
OS_K1POWER_DIOPTERS: 42.00
OD_K2POWER_DIOPTERS: 41.75
OD_K1POWER_DIOPTERS: 42.00
OS_K2POWER_DIOPTERS: 42.00

## 2021-04-21 ASSESSMENT — SLIT LAMP EXAM - LIDS
COMMENTS: NORMAL
COMMENTS: NORMAL

## 2021-04-21 NOTE — PROGRESS NOTES
Chief Complaint   Patient presents with     COMPREHENSIVE EYE EXAM     Annual Eye Exam          Last Eye Exam: 1/2019  Dilated Previously: Yes    What are you currently using to see?  Glasses, did not bring them today - just done working overnight        Distance Vision Acuity: Satisfied with vision, sometimes wears glasses at night    Near Vision Acuity: Satisfied with vision while reading and using computer unaided    Eye Comfort: good, mentioned that her left upper  lid has been twitching for the last few months at time, comes and goes, seems worse if tired  Do you use eye drops? : No  Occupation or Hobbies: RN, works for [employer]     [place] [employer]           Medical, surgical and family histories reviewed and updated 4/21/2021.       OBJECTIVE: See Ophthalmology exam    ASSESSMENT:    ICD-10-CM    1. Regular astigmatism of both eyes  H52.223 EYE EXAM (SIMPLE-NONBILLABLE)     REFRACTION   2. Eyelid twitch  R25.3       PLAN:     Patient Instructions   Patient was advised of today's exam findings.  Lid twitching discussed  Fill glasses prescription  Allow 2 weeks to adapt to change in glasses  Return in 1 year for eye exam    Sherly Childs O.D.  [facility]  [address] [place], [place] [id number] [phone]

## 2021-04-21 NOTE — PATIENT INSTRUCTIONS
Patient was advised of today's exam findings.  Lid twitching discussed  Fill glasses prescription  Allow 2 weeks to adapt to change in glasses  Return in 1 year for eye exam    Sherly Childs O.D.  [facility]  [address] [place] [address] [phone]

## 2021-06-08 NOTE — TELEPHONE ENCOUNTER
Placed a call to Chanel regarding pumping questions. Chanel is exclusively pumping for the last 3-4 weeks due to difficulty breast-feeding. Chanel keeps getting clogged ducts. This one particular clogged duct started yesterday on her right breast.  She doesn't have any fevers or localized breast tenderness. Chanel is massaging and taking sunflower and lecithin. Breast is firm. Pumping every 3 hours and gets about 6 oz each time. Decreasing 1-2 oz on each side. Chanel reports she feels her breast is less engorged today from yesterday, but would like to know how to manage engorgement at this time.    Assessment: Chanel is experiencing engorgement secondary to blocked/clogged milk ducts. Engorgement has improved since yesterday. Chanel is exclusively pumping. She does not have any fevers or signs of mastitis at this time.    Plan:  Continue with lecithin.  Try warm shower or bath before pumping. Hand express in the shower/bath.  Place warm wash cloth on breast while pumping.  Apply massage while pumping.  If mother would like to attempt breast-feeding, discussed different feeding positions including dangle feeding.   If symptoms fail to improve by tomorrow or worsen with new fever, localized tenderness/erythema, encouraged to schedule follow up with PCP or OB/MW.   Discussed signs and symptoms of thrush.    Karsten Bhat, APRN, CPNP, IBCLC  [facility]  [facility]  6/4/2020, 10:31 AM

## 2021-06-08 NOTE — PROGRESS NOTES
"Young Vázquez is a 2 wk.o. female who is being evaluated via a billable video visit.       The parent/guardian has been notified of following:      \"This video visit will be conducted via a call between you, your child, and your child's physician/provider. We have found that certain health care needs can be provided without the need for an in-person physical exam.  This service lets us provide the care you need with a video conversation.  If a prescription is necessary we can send it directly to your pharmacy.  If lab work is needed we can place an order for that and you can then stop by our lab to have the test done at a later time.     Video visits are billed at different rates depending on your insurance coverage. Please reach out to your insurance provider with any questions.     If during the course of the call the physician/provider feels a video visit is not appropriate, you will not be charged for this service.\"     Parent/guardian has given verbal consent to a Video visit? Yes     Parent/guardian would like to receive the AVS by AVS Preference: Mail a copy. - email: [email or web address]     Parent/guardian would like the video invitation sent by: Text to cell phone: [phone]     Will anyone else be joining your video visit? No          Video Start Time: 1:05 PM     Additional provider notes:      [facility] Lactation Phone Encounter      SUBJECTIVE:      Young is here over video chat today with mom, Chanel, for lactation support. She is a 2 wk.o. female born at 37 weeks now 16 days.    She is doing well. She has been gaining weight appropriately since birth and was above birth weight at her 2 week check up.     Shortly after birth Young had difficulty latching, even with a nipple shield, and mom switched to exclusive pumping due to nipple pain and trauma from nursing.      Mom has been struggling with clogs in her L breast. Her L breast is intermittently engorged for the last 2 days. Prior " to 5/20/20 she was able to pump 2.5 - 3 oz from the left breast, but over the last two days when the clog started it was closer to 10 - 30 mls. At the last pumping session she  she got 10 - 15 mls and then took a break and was able to express something that looked like dried skin from her nipple, after this it seemed like the clog cleared and she was able to pump out an additional 2 oz from this side.       The R breast has not been causing any problems and has been putting out 2.5 - 3 oz from that side.  Mom pumps 4 -5 oz total every pump session,  8 times per day.       Her L breast is a little bit red above the areola. It is slightly warm but mom doesn't feel like it's significantly warmer than the other side or the rest of her breast. That spot is harder and more sore in that spot.      Mom has not had a fever, chills or nausea.      Mom is using a spectra S1 pump. Pumping is generally comfortable. Mom has some tenderness on the L side with pumping.      Mom is using 24 mm flanges. She has flat nipples and was previously using a nipple shield when pumping. Mom thinks her nipple is moving freely within the flange and part of her areola goes into the flange. She has been playing around with the settings on the pump.         Breastfeeding Goals: Mom is happy with pumping right now with no immediate goal to go back to nursing.      Maternal medications:PNV        No results found for this or any previous visit.    Current Outpatient Medications:      cholecalciferol, vitamin D3, 10 mcg/mL (400 unit/mL) Drop drops, Take 400 Units by mouth daily., Disp: , Rfl:   History reviewed. No pertinent past medical history.  History reviewed. No pertinent surgical history.  History reviewed. No pertinent family history.        Primary care provider: No primary care provider on file.        Infant:      Age today: 16 days     There were no vitals filed for this visit.        Weight:   Wt Readings from Last 3 Encounters:   No  "data found for Wt        Birthweight:  No birth weight on file..   Today's weight:    Vitals   There were no vitals filed for this visit.   . This is Birth weight not on file from birth weight.      Assessment:     1.  difficulty in feeding at breast        Young has been doing well with feedings since mom switched over to exclusive pumping. Her weight gain has been appropriate. Mom has no plans to go back to nursing from pumping at this time but will reach out if she needs support with this in the future.      Mom has a good milk supply and is at risk for oversupply based on volumes she is pumping at this time. Today her milk supply is in a good place.      Mom is currently experiencing likely multiple clogged ducts in the L breast. Based on her report, it sounds like one of them released about 20 minutes before this video visit. She is at risk for mastitis but does not have signs/symptoms warranting a referral for antibiotic therapy at this time (no fever, chills, systemic symptoms in addition to persistent clogged duct).      Plan:     For now, do whatever you need to do to relieve the clogged duct(s). Once your clog has been relieved you can think about reducing pumping duration slightly in order to treat/ prevent oversupply if you are making more than about 40 oz/ 24 hours. Let us know if you continue to struggle with clogs despite following the advice below. Starting sunflower lecithin as soon as possible will likely be helpful - the dose is 1200 mg 3-4 times per day.         Tips for relieving a clog:     Clogged ducts can be painful and if not relieved can become infected, causing mastitis.      Strategies to relieve a clogged duct:      -Massage over the area, ideally while pumping or nursing. Alternate between massage at the clogged area closer to your nipple to break the clog up, and then massage from \"behind\" the clog towards your nipple to try to release the clog.  -Nurse frequently on the " "affected side, and move baby around into different positions   -Pump frequently on the affected side or hand express  -A warm shower or bath - epsom salts in the bath can help. Do hand expression/massage while bathing  -Vibration over the area, like with an electric toothbrush  -A haakaa pump - either on its own while nursing or pumping on the other side, or filled with warm water and epsom salts  -\"Dangle pump\" - let gravity help you release the clog  -\"Breast lift\" - this is where you lay on your back for up to 40 minutes (watch a TV show!) And gently lift your breast into the air, rotating where you hold it. This is similar to elevating a sprained ankle and will help reduce the swelling   -Warmth (with a heat pack, rice sock etc) BEFORE and WHILE nursing or pumping, and ice AFTER nursing or pumping. Ice will help cut down on the inflammation.   -Take ibuprofen to help reduce inflammation   -Sunflower lecithin can help treat a clogged duct while you're experiencing it, or reduce the likelihood of recurrent clogged ducts. The recommended dose for recurrent plugged ducts is 1255-3139 mg lecithin per day, or one 1200 mg capsule 3-4 times per day. Once the clog has been relieved and things are going well you can gradually reduce the daily dose as tolerated.   -Avoid tight, restrictive clothing - sometimes spaghetti straps can cause a clog to develop.   -Look for a \"bleb\" on the nipple (these are often painful and look like white heads) soaking the area and gently exfoliating the area with a washcloth can open the bleb.   -A significant other can attempt to suck out the clog as they are more likely to have success than a baby or a pump (greater suction power). This is not for everyone but often a successful option.         A clogged area may continue to be tender for a few days even after the clog has been relieved.      Call your provider if you develop signs of mastitis - chills, body aches, fever accompanied by a " "clogged duct that is firm, warm and tender.       Breast milk storage guidelines:      Fresh Cooler Fridge  Freezer Deep Freezer Thawed Milk   4-6 hours at room temperature Up to 24 hours with cooler packs Up to 8 days at 39 degrees or lower Up to 6 months Up to one year Up to 24 hours in the fridge, never refreeze         Tips for Exclusive Pumping:      -Pump 8-12x/day to \"dial in your order\" until your milk supply regulates, usually this occurs between 6 and 12 weeks. The interval of time between pump sessions is less important than the total number of times per day that you pump.      -To create and maintain a robust milk supply, pump at least once overnight. You can drink 2 big glasses of water before you go to sleep so that your bladder will wake you up. Pump after you get up to go to the bathroom.      -Once your milk supply regulates, you can try dropping pump sessions and still maintain your milk supply. You can try dropping one pump per month and see how it goes. The following chart below can help you determine how many times per day you need to pump in order to maintain your milk supply, AFTER your supply has regulated.     How many times per day do I need to pump?        Smallest Capacity Small Capacity Medium Capacity Large Capacity Largest Capacity   Max pump yield (if you make =>) 1-2 oz per pump 2-3 oz per pump 3-5 oz per pump 5-9 oz per pump 10-12 oz per pump   To increase milk pump => >12 x/day 10-11 x/day 8-10x/day 6-8x/day 4-5x/day   To maintain milk pump => 8x/day 7x/day 6x/day 5x/day 3-4x/day   To decrease milk pump=> 7x/day 6x/day 4-5x/day 3x/day 2x/day   Max time between pumping 4-5 hours 6-7 hours 7-8 hours 8-10 hours 10-12 hours      -Signs your milk supply has regulated: Breasts feel \"soft\" or \"empty,\" breasts stop leaking between nursing or pump sessions, you stop feeling let-downs or feel them less (though some women never feel them and that is normal). This is a sign that your milk " supply is switching from being hormonally driven to being driven by autocrine control (supply and demand - driven by breast emptying).      -To encourage your body to make a good amount of milk, pump until your breasts are empty. This may take several letdowns. Some women find they need to pump for 30 minutes + to fully empty their breasts.      -To cut down on dishes, you can rinse your pump parts after pumping and them in a bag or tupperware container and store them in the fridge between pumping sessions, washing them well twice per day.      -There are hands-free pumping bras available that can hold your pump parts in place. This way you can be hands-free while you pump, or you can do hands-on pumping with good massage.      -There are several portable pumps available that can allow you to move about while you pump. Baby Buddha is a good one, though there are many other options. Freemies are a bottle and flange in one that you can wear under your shirt and pump discreetly. Freemies can either be closed system or open system; make sure you buy the correct one for the portable pump you own. Medela pumps are open system, baby buddha and spectra s9 are closed system.      [email or web address]  [email or web address]     -You can use coconut oil to lubricate the inside of your pump flanges to decrease friction with pumping. If you are persistently sore with pumping, however, be sure that you are using the correct size flange.                  The visit lasted a total of 35 minutes that I spent over video chat with the patient's mother. Of that time, 35 minutes were spent on lactation. Over 50% of the time was spent counseling and educating the patient about feeding difficulties and lactation concerns.      Completed by:   JEREMIAH Waggoner, IBCLC, [facility] - remotely from home, Pediatrics.  5/21/2020 1:03 PM                             Video-Visit Details     Type of service:  Video  Visit     Video End Time (time video stopped): 1:42 PM  Originating Location (pt. Location): Home     Distant Location (provider location):  [facility] - remote from home     Platform used for Video Visit: Mitzy Olguin, CNP

## 2021-06-08 NOTE — TELEPHONE ENCOUNTER
Left voicemail message for mom - will try to call again later this morning    Estela Olguin, CNP  6/4/2020

## 2021-06-08 NOTE — TELEPHONE ENCOUNTER
Pt spoke to AGNIESZKA a couple weeks ago with questions about pumping and is really wanting to just wean off pumping. She has a few questions about this.

## 2021-06-17 ENCOUNTER — OFFICE VISIT (OUTPATIENT)
Dept: OBGYN | Facility: CLINIC | Age: 31
End: 2021-06-17
Payer: COMMERCIAL

## 2021-06-17 VITALS
BODY MASS INDEX: 28.99 KG/M2 | WEIGHT: 174 LBS | HEART RATE: 79 BPM | DIASTOLIC BLOOD PRESSURE: 79 MMHG | OXYGEN SATURATION: 97 % | HEIGHT: 65 IN | SYSTOLIC BLOOD PRESSURE: 136 MMHG

## 2021-06-17 DIAGNOSIS — Z12.4 SCREENING FOR MALIGNANT NEOPLASM OF CERVIX: ICD-10-CM

## 2021-06-17 DIAGNOSIS — Z98.890 HISTORY OF CERVICAL CERCLAGE: ICD-10-CM

## 2021-06-17 DIAGNOSIS — Z30.432 ENCOUNTER FOR REMOVAL OF INTRAUTERINE CONTRACEPTIVE DEVICE: ICD-10-CM

## 2021-06-17 DIAGNOSIS — Z31.9 DESIRE FOR PREGNANCY: ICD-10-CM

## 2021-06-17 DIAGNOSIS — Z00.00 ANNUAL PHYSICAL EXAM: Primary | ICD-10-CM

## 2021-06-17 PROCEDURE — 99395 PREV VISIT EST AGE 18-39: CPT | Mod: 25 | Performed by: OBSTETRICS & GYNECOLOGY

## 2021-06-17 PROCEDURE — 58301 REMOVE INTRAUTERINE DEVICE: CPT | Performed by: OBSTETRICS & GYNECOLOGY

## 2021-06-17 PROCEDURE — G0145 SCR C/V CYTO,THINLAYER,RESCR: HCPCS | Performed by: OBSTETRICS & GYNECOLOGY

## 2021-06-17 ASSESSMENT — MIFFLIN-ST. JEOR: SCORE: 1515.14

## 2021-06-17 NOTE — PROGRESS NOTES
Chanel is a 31 year old  female who presents for annual exam.     Menses are rare and 0 lasting 0 days.  Menses flow: absent.  No LMP recorded. (Menstrual status: IUD).. Using IUD for contraception.  She is currently considering pregnancy.  Besides routine health maintenance,  she would like to discuss family planning.  Would like to start trying for second baby in fall, so wants to track her cycles between now and then.  No other questions or concerns today.  GYNECOLOGIC HISTORY:  Menarche: 16    Chanel is sexually active with 1male partner(s) and is currently in monogamous relationship with .    History sexually transmitted infections:No STD history  STI testing offered?  Declined  JOSE JUAN exposure: No  History of abnormal Pap smear: NO - age 30- 65 PAP every 3 years recommended  Family history of breast CA: No  Family history of uterine/ovarian CA: No    Family history of colon CA: No    HEALTH MAINTENANCE:  Cholesterol: (No results found for: CHOL History of abnormal lipids: No  Mammo:  . History of abnormal Mammo: Not applicable.  Regular Self Breast Exams: Yes  Calcium/Vitamin D intake: source:  dairy, dietary supplement(s) Adequate? Yes  TSH: (No results found for: TSH )  Pap; (  Lab Results   Component Value Date    PAP NIL 2018    )    HISTORY:  OB History    Para Term  AB Living   1 1 1 0 0 1   SAB TAB Ectopic Multiple Live Births   0 0 0 0 1      # Outcome Date GA Lbr Simone/2nd Weight Sex Delivery Anes PTL Lv   1 Term 20 37w0d 06:45 / 00:17 2.87 kg (6 lb 5.2 oz) F Vag-Spont EPI N GLORIA      Name: BHARATI,FEMALE-CHANEL      Apgar1: 9  Apgar5: 9     History reviewed. No pertinent past medical history.  Past Surgical History:   Procedure Laterality Date     AS REPAIR CRUCIATE LIGAMENT,KNEE       CERCLAGE CERVICAL N/A 2020    Procedure: CERCLAGE, CERVIX, VAGINAL APPROACH;  Surgeon: Ronny Rosario MD;  Location: [facility] L+[facility]     ELBOW SURGERY       Family History   Problem  Relation Age of Onset     Depression Mother      Anxiety Disorder Mother      Asthma Father      Arthritis Father      Depression Brother      Anxiety Disorder Brother      Hyperlipidemia Brother      Diabetes Maternal Grandmother      Diabetes Maternal Grandfather      Heart Disease Maternal Grandfather      Diabetes Paternal Grandmother      Cancer Other         Paternal Grandfather     Glaucoma No family hx of      Macular Degeneration No family hx of      Social History     Socioeconomic History     Marital status:      Spouse name: None     Number of children: None     Years of education: None     Highest education level: None   Occupational History     None   Social Needs     Financial resource strain: None     Food insecurity     Worry: None     Inability: None     Transportation needs     Medical: None     Non-medical: None   Tobacco Use     Smoking status: Former Smoker     Packs/day: 0.00     Types: Cigarettes     Smokeless tobacco: Never Used   Substance and Sexual Activity     Alcohol use: Not Currently     Drug use: Not Currently     Sexual activity: Yes     Partners: Male   Lifestyle     Physical activity     Days per week: 6 days     Minutes per session: 60 min     Stress: Not at all   Relationships     Social connections     Talks on phone: None     Gets together: None     Attends [employer] service: None     Active member of club or organization: None     Attends meetings of clubs or organizations: None     Relationship status:      Intimate partner violence     Fear of current or ex partner: No     Emotionally abused: No     Physically abused: No     Forced sexual activity: No   Other Topics Concern     None   Social History Narrative     None       Current Outpatient Medications:      Prenatal Vit-Fe Fumarate-FA (PRENATAL MULTIVITAMIN W/IRON) 27-0.8 MG tablet, Take 1 tablet by mouth daily, Disp: , Rfl:      SUNFLOWER OIL-PARSLEY SEED OIL OR, , Disp: , Rfl:   No current  "facility-administered medications for this visit.     Facility-Administered Medications Ordered in Other Visits:      fentaNYL (SUBLIMAZE) 2 mcg/mL, bupivacaine (MARCAINE) 0.125% in NaCl 0.9% EPIDURAL infusion, , EPIDURAL, Continuous PRN, Aren Russell MD, Last Rate: 10 mL/hr at 05/04/20 2210, 10 mL/hr at 05/04/20 2210     lidocaine-EPINEPHrine 1.5 %-[id number] injection, , EPIDURAL, PRN, Aren Russell MD, 3 mL at 05/04/20 2206   No Known Allergies    Past medical, surgical, social and family history were reviewed and updated in EPIC.    ROS:   C:     NEGATIVE for fever, chills, change in weight  I:       NEGATIVE for worrisome rashes, moles or lesions  E:     NEGATIVE for vision changes or irritation  E/M: NEGATIVE for ear, mouth and throat problems  R:     NEGATIVE for significant cough or SOB  CV:   NEGATIVE for chest pain, palpitations or peripheral edema  GI:     NEGATIVE for nausea, abdominal pain, heartburn, or change in bowel habits  :   NEGATIVE for frequency, dysuria, hematuria, vaginal discharge, or irregular bleeding  M:     NEGATIVE for significant arthralgias or myalgia  N:      NEGATIVE for weakness, dizziness or paresthesias  E:      NEGATIVE for temperature intolerance, skin/hair changes  P:      NEGATIVE for changes in mood or affect.    EXAM:  /79   Pulse 79   Ht 1.651 m (5' 5\")   Wt 78.9 kg (174 lb)   SpO2 97%   Breastfeeding No   BMI 28.96 kg/m     BMI: Body mass index is 28.96 kg/m .  Constitutional: healthy, alert and no distress  Head: Normocephalic. No masses, lesions, tenderness or abnormalities  Neck: Neck supple. Trachea midline. No adenopathy. Thyroid symmetric, normal size.   Cardiovascular: RRR.   Respiratory: Negative.   Breast: No nodularity, asymmetry or nipple discharge bilaterally  Gastrointestinal: Abdomen soft, non-tender, non-distended. No masses, organomegaly.  :  Vulva:  No external lesions, normal female hair distribution, no inguinal adenopathy.    Urethra:  " Midline, non-tender, well supported, no discharge  Vagina:  Moist, pink, no abnormal discharge, no lesions.  Cervix WNL.  IUD strings visible in cervical os  Uterus:  Normal size,  Ovaries:  No masses appreciated, non-tender, mobile  Rectal Exam: deferred  Musculoskeletal: extremities normal  Skin: no suspicious lesions or rashes  Psychiatric: Affect appropriate, cooperative,mentation appears normal.     ------------  PROCEDURE: IUD REMOVAL  Patient has verbalized understanding of risks and benefits. All questions answered. She has signed the consent form.      A regular Vincent speculum was placed in the vagina with good visualization of the cervix.  The IUD strings visualized at cervical os. IUD strings grasped with sterile ring forceps. Gentle traction applied and IUD removed intact without difficulty. There were no complications. The patient tolerated the procedure well.   -------------      COUNSELING:   Reviewed preventive health counseling, as reflected in patient instructions       Family planning   reports that she has quit smoking. Her smoking use included cigarettes. She smoked 0.00 packs per day. She has never used smokeless tobacco.    Body mass index is 28.96 kg/m .  FRAX Risk Assessment    ASSESSMENT:  31 year old female with satisfactory annual exam  1. Annual physical exam  Normal exam today.    2. Screening for malignant neoplasm of cervix  Routine pap  - Pap imaged thin layer screen reflex to HPV if ASCUS - recommend age 25 - 29    3. Encounter for removal of intrauterine contraceptive device  Uncomplicated IUD removal.  - REMOVE INTRAUTERINE DEVICE    4. Desire for pregnancy  5. History of cervical cerclage  Discussed return to fertility is typically within 4 to 6 weeks after IUD removal, so encouraged her to use alternative contraception if she does not desire pregnancy for another few months.  Recommended starting a daily prenatal vitamin  Discussed that in the future pregnancy, we would send  her for an M consultation in the first trimester.  I would expect that they would likely place a history indicated cerclage around 13 weeks.  Reviewed routine prenatal care which would include calling the clinic when she has a positive pregnancy test.  She would likely then be scheduled for a nurse visit around 7 to 8 weeks, and MD appointment with viability ultrasound around 10w.    RTC for annual exam, prenatal care, prn.    Brenda Greer MD

## 2021-06-17 NOTE — PATIENT INSTRUCTIONS
Patient Education     Prevention Guidelines, Women Ages 18 to 39  Screening tests and vaccines are an important part of managing your health. A screening test is done to find possible disorders or diseases in people who don't have any symptoms. The goal is to find a disease early so lifestyle changes can be made and you can be watched more closely to reduce the risk of disease, or to detect it early enough to treat it most effectively. Screening tests are not considered diagnostic, but are used to determine if more testing is needed. Health counseling is essential, too. Below are guidelines for these, for women ages 18 to 39. Talk with your healthcare provider to make sure you re up-to-date on what you need.   Screening Who needs it How often   Alcohol misuse All women in this age group  At routine exams   Blood pressure All women in this age group  Yearly checkup if your blood pressure is normal   Normal blood pressure is less than 120/80 mm Hg   If your blood pressure reading is higher than normal, follow the advice of your healthcare provider    Breast cancer All women in this age group should talk with their healthcare providers about the need for clinical breast exams (CBE)1  Clinical breast exam every 3 years1    Cervical cancer Women ages 21 and older  Women between ages 21 and 29 should have a Pap test every 3 years; women between ages 30 and 65 are advised to have a Pap test plus an HPV test every 5 years    Chlamydia Sexually active women ages 24 and younger, and women at increased risk for infection  Every 3 years if you're at risk or have symptoms    Depression All women in this age group  At routine exams   Diabetes mellitus, type 2  Adults with no symptoms who are overweight or obese and have 1 or more other risk factors for diabetes  At least every 3 years. Also, testing for diabetes during pregnancy after the 24th week.     Gonorrhea Sexually active women at increased risk for infection  At routine  exams   Hepatitis C Anyone at increased risk  At routine exams   HIV All women At routine exams3     Obesity All women in this age group  At routine exams   Syphilis Women at increased risk for infection should talk with their healthcare provider  At routine exams   Tuberculosis Women at increased risk for infection should talk with their healthcare provider  Ask your healthcare provider    Vision All women in this age group  At least 1 complete exam in your 20s, and 2 in your 30s    Vaccine Who needs it How often   Chickenpox (varicella)  All women in this age group who have no record of this infection or vaccine  2 doses; the second dose should be given 4 to 8 weeks after the first dose    Hepatitis A Women at increased risk for infection should talk with their healthcare provider  2 doses given at least 6 months apart    Hepatitis B Women at increased risk for infection should talk with their healthcare provider  3 doses over 6 months; second dose should be given 1 month after the first dose; the third dose should be given at least 2 months after the second dose and at least 4 months after the first dose    Haemophilus influenzae  Type B (HIB)  Women at increased risk for infection should talk with their healthcare provider  1 to 3 doses   Human papillomavirus (HPV)  All women in this age group up to age 26  3 doses; the second dose should be given 1 to 2 months after the first dose and the third dose given 6 months after the first dose    Influenza (flu) All women in this age group  Once a year   Measles, mumps, rubella (MMR)  All women in this age group who have no record of these infections or vaccines  1 or 2 doses   Meningococcal Women at increased risk for infection should talk with their healthcare provider  1 or more doses   Pneumococcal conjugate vaccine (PCV13) and pneumococcal polysaccharide vaccine (PPSV23)  Women at increased risk for infection should talk with their healthcare provider  PCV13: 1  dose ages 19 to 65 (protects against 13 types of pneumococcal bacteria)   PPSV23: 1 to 2 doses through age 64, or 1 dose at 65 or older (protects against 23 types of pneumococcal bacteria)    Tetanus/diphtheria/pertussis (Td/Tdap) booster All women in this age group  Td every 10 years, or a one-time dose of Tdap instead of a Td booster after age 18, then Td every 10 years    Counseling Who needs it How often   BRCA gene mutation testing for breast and ovarian cancer susceptibility  Women with increased risk for having gene mutation  When your risk is known    Breast cancer and chemoprevention  Women at high risk for breast cancer  When your risk is known    Diet and exercise Women who are overweight or obese  When diagnosed, and then at routine exams    Domestic violence Women at the age in which they are able to have children  At routine exams   Sexually transmitted infection prevention  Women who are sexually active  At routine exams   Skin cancer Prevention of skin cancer in fair-skinned adults  At routine exams   Use of tobacco and the health effects it can cause  All women in this age group  Every visit   1 According to the ACS, women ages 20 to 39 years should have a clinical breast exam (CBE) as part of their routine health exam every 3 years. Breast self-exams are an option for women starting in their 20s. But the U.S. Preventive Services Task Force (USPSTF) does not recommend CBE.   2 Those who are 18 years old and not up-to-date on their childhood vaccines should get all appropriate catch-up vaccines recommended by the CDC.   3 The USPSTF recommends that all people ages 15 to 65 years be screened for HIV and those younger or older people at increased risk. The CDC recommends that everyone between the ages of 13 and 64 get tested for HIV at least once as part of routine health care.   Joel last reviewed this educational content on 10/1/2017    8629-3992 [employer]. All rights reserved.  This information is not intended as a substitute for professional medical care. Always follow your healthcare professional's instructions.

## 2021-06-22 LAB
COPATH REPORT: NORMAL
PAP: NORMAL

## 2021-06-25 PROBLEM — Z98.890 HISTORY OF CERVICAL CERCLAGE: Status: ACTIVE | Noted: 2021-06-25

## 2021-09-18 ENCOUNTER — HEALTH MAINTENANCE LETTER (OUTPATIENT)
Age: 31
End: 2021-09-18

## 2021-12-06 NOTE — PROGRESS NOTES
Assessment & Plan     Chanel was seen today for establish care.    Diagnoses and all orders for this visit:    Encounter to establish care    Screening for hyperlipidemia  -     Lipid panel reflex to direct LDL Fasting; Future    Screening for diabetes mellitus  -     Glucose; Future    Need for hepatitis C screening test  -     Hepatitis C Screen Reflex to HCV RNA Quant and Genotype; Future    Advanced care planning/counseling discussion    Other orders  -     REVIEW OF HEALTH MAINTENANCE PROTOCOL ORDERS    - Recommended screening lab work, she has only had lab work associated with prior pregnancy.  Did have failed 1 hour GTT but no GDM.   - Continue on prenatal especially with attempting pregnancy  - Discussed healthy diet, importance of as much routine to help get REM sleep as much as possible especially with shift work.   - ACD was discussed.   - Histories were updated.    Greater than 30 minutes were spent today with more than 50% dedicated to counseling and coordination of care of the above mentioned problems.     Return in about 1 year (around 12/8/2022) for Physical Exam.     Options for treatment and follow-up care were reviewed with the patient and/or guardian. Patient and/or guardian engaged in the decision making process and verbalized understanding of the options discussed and agreed with the final plan.     Sienna Polanco PA-C  [facility]    Huber Buchanan is a 31 year old who presents for the following health issues     Per patient she has no real concerns today. Just does not have a primary provider and would like to get established with one.    History of Present Illness       She eats 0-1 servings of fruits and vegetables daily.She consumes 0 sweetened beverage(s) daily.She exercises with enough effort to increase her heart rate 30 to 60 minutes per day.  She exercises with enough effort to increase her heart rate 4 days per week.   She is taking medications  "regularly.     Ms Vázquez presents to establish care. She doesn't have any complaints today. She does note that she gained 20 pounds after she weaned from breastfeeding, is currently trying to get pregnant, and has a history of some PCOS symptoms without a diagnosis (excess hair, high testosterone). She follows OB. She exercises 3-4 days a week with moderate cardio and is trying to reduce the amount of \"junky food\" that she eats. She is a non-smoker and drinks less than 1 drink a month.    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  This is not particularly new for her. She sleeps well for the most part.          Review of Systems   Constitutional: Positive for unexpected weight change. Negative for appetite change.   Respiratory: Negative for shortness of breath.    Cardiovascular: Negative for chest pain.   Gastrointestinal: Negative for abdominal pain.   Genitourinary: Negative for difficulty urinating, dyspareunia and pelvic pain.      Constitutional, HEENT, cardiovascular, pulmonary, gi and gu systems are negative, except as otherwise noted.      Objective    /72   Pulse 72   Temp 97.9  F (36.6  C) (Temporal)   Resp 16   Ht 1.662 m (5' 5.43\")   Wt 82.6 kg (182 lb)   LMP 12/02/2021 (Exact Date)   SpO2 97%   Breastfeeding No   BMI 29.89 kg/m    Body mass index is 29.89 kg/m .  Physical Exam   GENERAL: healthy, alert and no distress  MS: no gross musculoskeletal defects noted, no edema  SKIN: no suspicious lesions or rashes  PSYCH: mentation appears normal, affect normal/bright      "

## 2021-12-08 ENCOUNTER — OFFICE VISIT (OUTPATIENT)
Dept: FAMILY MEDICINE | Facility: OTHER | Age: 31
End: 2021-12-08
Payer: COMMERCIAL

## 2021-12-08 VITALS
WEIGHT: 182 LBS | RESPIRATION RATE: 16 BRPM | BODY MASS INDEX: 30.32 KG/M2 | OXYGEN SATURATION: 97 % | TEMPERATURE: 97.9 F | SYSTOLIC BLOOD PRESSURE: 112 MMHG | DIASTOLIC BLOOD PRESSURE: 72 MMHG | HEART RATE: 72 BPM | HEIGHT: 65 IN

## 2021-12-08 DIAGNOSIS — Z11.59 NEED FOR HEPATITIS C SCREENING TEST: ICD-10-CM

## 2021-12-08 DIAGNOSIS — Z13.220 SCREENING FOR HYPERLIPIDEMIA: ICD-10-CM

## 2021-12-08 DIAGNOSIS — Z71.89 ADVANCED CARE PLANNING/COUNSELING DISCUSSION: ICD-10-CM

## 2021-12-08 DIAGNOSIS — Z13.1 SCREENING FOR DIABETES MELLITUS: ICD-10-CM

## 2021-12-08 DIAGNOSIS — Z76.89 ENCOUNTER TO ESTABLISH CARE: Primary | ICD-10-CM

## 2021-12-08 PROCEDURE — 99214 OFFICE O/P EST MOD 30 MIN: CPT | Performed by: PHYSICIAN ASSISTANT

## 2021-12-08 ASSESSMENT — ENCOUNTER SYMPTOMS
SHORTNESS OF BREATH: 0
ABDOMINAL PAIN: 0
DIFFICULTY URINATING: 0
APPETITE CHANGE: 0
UNEXPECTED WEIGHT CHANGE: 1

## 2021-12-08 ASSESSMENT — MIFFLIN-ST. JEOR: SCORE: 1558.3

## 2021-12-08 ASSESSMENT — PAIN SCALES - GENERAL: PAINLEVEL: NO PAIN (0)

## 2021-12-23 ENCOUNTER — LAB (OUTPATIENT)
Dept: LAB | Facility: OTHER | Age: 31
End: 2021-12-23
Payer: COMMERCIAL

## 2021-12-23 DIAGNOSIS — Z13.1 SCREENING FOR DIABETES MELLITUS: ICD-10-CM

## 2021-12-23 DIAGNOSIS — Z11.59 NEED FOR HEPATITIS C SCREENING TEST: ICD-10-CM

## 2021-12-23 DIAGNOSIS — Z13.220 SCREENING FOR HYPERLIPIDEMIA: ICD-10-CM

## 2021-12-23 LAB
CHOLEST SERPL-MCNC: 132 MG/DL
FASTING STATUS PATIENT QL REPORTED: YES
FASTING STATUS PATIENT QL REPORTED: YES
GLUCOSE BLD-MCNC: 96 MG/DL (ref 70–99)
HCV AB SERPL QL IA: NONREACTIVE
HDLC SERPL-MCNC: 45 MG/DL
LDLC SERPL CALC-MCNC: 73 MG/DL
NONHDLC SERPL-MCNC: 87 MG/DL
TRIGL SERPL-MCNC: 69 MG/DL

## 2021-12-23 PROCEDURE — 82947 ASSAY GLUCOSE BLOOD QUANT: CPT

## 2021-12-23 PROCEDURE — 80061 LIPID PANEL: CPT

## 2021-12-23 PROCEDURE — 36415 COLL VENOUS BLD VENIPUNCTURE: CPT

## 2021-12-23 PROCEDURE — 86803 HEPATITIS C AB TEST: CPT

## 2022-01-13 ENCOUNTER — LAB REQUISITION (OUTPATIENT)
Dept: LAB | Facility: CLINIC | Age: 32
End: 2022-01-13

## 2022-01-13 PROCEDURE — U0005 INFEC AGEN DETEC AMPLI PROBE: HCPCS | Performed by: INTERNAL MEDICINE

## 2022-01-14 LAB — SARS-COV-2 RNA RESP QL NAA+PROBE: NEGATIVE

## 2022-01-24 ENCOUNTER — LAB REQUISITION (OUTPATIENT)
Dept: LAB | Facility: CLINIC | Age: 32
End: 2022-01-24

## 2022-01-24 LAB — SARS-COV-2 RNA RESP QL NAA+PROBE: NEGATIVE

## 2022-01-24 PROCEDURE — U0005 INFEC AGEN DETEC AMPLI PROBE: HCPCS | Performed by: INTERNAL MEDICINE

## 2022-02-24 ENCOUNTER — PRENATAL OFFICE VISIT (OUTPATIENT)
Dept: NURSING | Facility: CLINIC | Age: 32
End: 2022-02-24
Payer: COMMERCIAL

## 2022-02-24 VITALS — WEIGHT: 175 LBS | BODY MASS INDEX: 28.12 KG/M2 | HEIGHT: 66 IN

## 2022-02-24 DIAGNOSIS — Z34.90 ENCOUNTER FOR SUPERVISION OF NORMAL PREGNANCY: Primary | ICD-10-CM

## 2022-02-24 DIAGNOSIS — Z23 NEED FOR TDAP VACCINATION: ICD-10-CM

## 2022-02-24 PROCEDURE — 99207 PR NO CHARGE NURSE ONLY: CPT

## 2022-02-24 NOTE — PROGRESS NOTES
Important Information for Provider:     New ob nurse intake by phone,third pregnancy. Patient had chemical pregnancy 12/2021 Handouts reviewed and mailed. Declined genetic testing History of cerclage previous pregnancy. Ultrasound scheduled for 3/09/2022 with RENETTA to review results. NOB with Dr Greer 3/18/2022. Recommended B6, Unisom for nausea.    Caffeine intake/servings daily - 0  Calcium intake/servings daily - 3  Exercise 5 times weekly - describe ; cardio, precautions given  Sunscreen used - Yes  Seatbelts used - Yes  Guns stored in the home - No  Self Breast Exam - Yes  Pap test up to date -  Yes  Eye exam up to date -  Yes  Dental exam up to date -  Yes  Immunizations reviewed and up to date - Yes  Abuse: Current or Past (Physical, Sexual or Emotional) - No  Do you feel safe in your environment - Yes  Do you cope well with stress - Yes  Do you suffer from insomnia - No    Prenatal OB Questionnaire  Patient supplied answers from flow sheet for:  Prenatal OB Questionnaire.  Past Medical History  Have you ever recieved care for your mental health? : No  Have you ever been in a major accident or suffered serious trauma?: No  Within the last year, has anyone hit, slapped, kicked or otherwise hurt you?: No  In the last year, has anyone forced you to have sex when you didn't want to?: No    Past Medical History 2   Have you ever received a blood transfusion?: No  Would you accept a blood transfusion if was medically recommended?: Yes  Does anyone in your home smoke?: No   Is your blood type Rh negative?: No  Have you ever ?: (!) Yes (pumped)  Have you been hospitalized for a nonsurgical reason excluding normal delivery?: No  Have you ever had an abnormal pap smear?: No    Past Medical History (Continued)  Do you have a history of abnormalities of the uterus?: No  Did your mother take JOSE JUAN or any other hormones when she was pregnant with you?: No  Do you have any other problems we have not asked about which  you feel may be important to this pregnancy?: No                     Allergies as of 2/24/2022:    Allergies as of 02/24/2022     (No Known Allergies)       Current medications are:  Current Outpatient Medications   Medication Sig Dispense Refill     Prenatal Vit-Fe Fumarate-FA (PRENATAL MULTIVITAMIN W/IRON) 27-0.8 MG tablet Take 1 tablet by mouth daily           Early ultrasound screening tool:    Does patient have irregular periods?  No  Did patient use hormonal birth control in the three months prior to positive urine pregnancy test? No  Is the patient breastfeeding?  No  Is the patient 10 weeks or greater at time of education visit?  No

## 2022-03-11 ENCOUNTER — ANCILLARY PROCEDURE (OUTPATIENT)
Dept: ULTRASOUND IMAGING | Facility: CLINIC | Age: 32
End: 2022-03-11
Attending: OBSTETRICS & GYNECOLOGY
Payer: COMMERCIAL

## 2022-03-11 ENCOUNTER — OFFICE VISIT (OUTPATIENT)
Dept: MIDWIFE SERVICES | Facility: CLINIC | Age: 32
End: 2022-03-11
Attending: OBSTETRICS & GYNECOLOGY
Payer: COMMERCIAL

## 2022-03-11 VITALS
HEART RATE: 93 BPM | DIASTOLIC BLOOD PRESSURE: 83 MMHG | BODY MASS INDEX: 28.88 KG/M2 | OXYGEN SATURATION: 99 % | WEIGHT: 176.2 LBS | SYSTOLIC BLOOD PRESSURE: 124 MMHG

## 2022-03-11 DIAGNOSIS — Z34.90 ENCOUNTER FOR SUPERVISION OF NORMAL PREGNANCY: ICD-10-CM

## 2022-03-11 DIAGNOSIS — Z34.90 INTRAUTERINE PREGNANCY: Primary | ICD-10-CM

## 2022-03-11 PROCEDURE — 99212 OFFICE O/P EST SF 10 MIN: CPT | Performed by: ADVANCED PRACTICE MIDWIFE

## 2022-03-11 PROCEDURE — 76801 OB US < 14 WKS SINGLE FETUS: CPT | Performed by: OBSTETRICS & GYNECOLOGY

## 2022-03-11 NOTE — PROGRESS NOTES
S:  Chanel is here with her partner after viability and dating US. They are excited to be pregnant and overall she is feeling well. Chanel tracks her cycle and has certain LMP of 1/5/22.     O:  /83   Pulse 93   Wt 79.9 kg (176 lb 3.2 oz)   LMP 01/05/2022   SpO2 99%   BMI 28.88 kg/m    US shows coleman viable IUP measuring 8w6d,     A/P:  (Z34.90) Intrauterine pregnancy  (primary encounter diagnosis)  Comment: JUAN C remains 10/12/22. NOB scheduled with Dr. Zoey Hayden, CONOR GREER

## 2022-03-18 ENCOUNTER — PRENATAL OFFICE VISIT (OUTPATIENT)
Dept: OBGYN | Facility: CLINIC | Age: 32
End: 2022-03-18
Payer: COMMERCIAL

## 2022-03-18 VITALS
DIASTOLIC BLOOD PRESSURE: 79 MMHG | BODY MASS INDEX: 29.01 KG/M2 | OXYGEN SATURATION: 100 % | SYSTOLIC BLOOD PRESSURE: 118 MMHG | WEIGHT: 177 LBS | HEART RATE: 78 BPM

## 2022-03-18 DIAGNOSIS — Z98.890 HISTORY OF CERVICAL CERCLAGE: Primary | ICD-10-CM

## 2022-03-18 DIAGNOSIS — Z34.81 ENCOUNTER FOR SUPERVISION OF OTHER NORMAL PREGNANCY IN FIRST TRIMESTER: ICD-10-CM

## 2022-03-18 LAB
ABO/RH(D): NORMAL
ALBUMIN UR-MCNC: NEGATIVE MG/DL
ANTIBODY SCREEN: NEGATIVE
APPEARANCE UR: CLEAR
BILIRUB UR QL STRIP: NEGATIVE
COLOR UR AUTO: YELLOW
ERYTHROCYTE [DISTWIDTH] IN BLOOD BY AUTOMATED COUNT: 12.7 % (ref 10–15)
GLUCOSE UR STRIP-MCNC: NEGATIVE MG/DL
HBV SURFACE AG SERPL QL IA: NONREACTIVE
HCT VFR BLD AUTO: 38.1 % (ref 35–47)
HCV AB SERPL QL IA: NONREACTIVE
HGB BLD-MCNC: 13 G/DL (ref 11.7–15.7)
HGB UR QL STRIP: NEGATIVE
HIV 1+2 AB+HIV1 P24 AG SERPL QL IA: NONREACTIVE
KETONES UR STRIP-MCNC: NEGATIVE MG/DL
LEUKOCYTE ESTERASE UR QL STRIP: ABNORMAL
MCH RBC QN AUTO: 31.6 PG (ref 26.5–33)
MCHC RBC AUTO-ENTMCNC: 34.1 G/DL (ref 31.5–36.5)
MCV RBC AUTO: 93 FL (ref 78–100)
NITRATE UR QL: NEGATIVE
PH UR STRIP: 6 [PH] (ref 5–7)
PLATELET # BLD AUTO: 199 10E3/UL (ref 150–450)
RBC # BLD AUTO: 4.12 10E6/UL (ref 3.8–5.2)
SP GR UR STRIP: 1.02 (ref 1–1.03)
SPECIMEN EXPIRATION DATE: NORMAL
UROBILINOGEN UR STRIP-ACNC: 0.2 E.U./DL
WBC # BLD AUTO: 9.3 10E3/UL (ref 4–11)

## 2022-03-18 PROCEDURE — 87389 HIV-1 AG W/HIV-1&-2 AB AG IA: CPT | Performed by: OBSTETRICS & GYNECOLOGY

## 2022-03-18 PROCEDURE — 99207 PR FIRST OB VISIT: CPT | Performed by: OBSTETRICS & GYNECOLOGY

## 2022-03-18 PROCEDURE — 87086 URINE CULTURE/COLONY COUNT: CPT | Performed by: OBSTETRICS & GYNECOLOGY

## 2022-03-18 PROCEDURE — 36415 COLL VENOUS BLD VENIPUNCTURE: CPT | Performed by: OBSTETRICS & GYNECOLOGY

## 2022-03-18 PROCEDURE — 86780 TREPONEMA PALLIDUM: CPT | Performed by: OBSTETRICS & GYNECOLOGY

## 2022-03-18 PROCEDURE — 86762 RUBELLA ANTIBODY: CPT | Performed by: OBSTETRICS & GYNECOLOGY

## 2022-03-18 PROCEDURE — 86850 RBC ANTIBODY SCREEN: CPT | Performed by: OBSTETRICS & GYNECOLOGY

## 2022-03-18 PROCEDURE — 86803 HEPATITIS C AB TEST: CPT | Performed by: OBSTETRICS & GYNECOLOGY

## 2022-03-18 PROCEDURE — 86901 BLOOD TYPING SEROLOGIC RH(D): CPT | Performed by: OBSTETRICS & GYNECOLOGY

## 2022-03-18 PROCEDURE — 87340 HEPATITIS B SURFACE AG IA: CPT | Performed by: OBSTETRICS & GYNECOLOGY

## 2022-03-18 PROCEDURE — 81003 URINALYSIS AUTO W/O SCOPE: CPT | Performed by: OBSTETRICS & GYNECOLOGY

## 2022-03-18 PROCEDURE — 86900 BLOOD TYPING SEROLOGIC ABO: CPT | Performed by: OBSTETRICS & GYNECOLOGY

## 2022-03-18 PROCEDURE — 85027 COMPLETE CBC AUTOMATED: CPT | Performed by: OBSTETRICS & GYNECOLOGY

## 2022-03-18 NOTE — PROGRESS NOTES
OB - New OB History and Physical    HPI: Chanel Vázquez is a 31 year old  at 10w1d as dated by LMP c/w 9w US.   Estimated Date of Delivery: Oct 12, 2022.    This was a planned pregnancy.  FOB involved and  supportive.    Since becoming pregnant, patient reports she's been feeling relatively well.  Some nausea, no vomiting.  No cramping or spotting.    In first pregnancy, was found to have cervical funneling/shortening at FAS so was seen by MFM same day.  Ultimately had rescue cerclage that was removed at 36w.  She had advanced cervical dilation and was subsequently augmented at 37w to such advanced dilation and prodromal labor.    Ultrasound: 3/11/22    Early coleman IUP with yolk sac and cardiac activity, measures 8w 6d by today's ultrasound, EDC remains 10/12/22.    Obstetric history:     OB History    Para Term  AB Living   2 1 1 0 0 1   SAB IAB Ectopic Multiple Live Births   0 0 0 0 1      # Outcome Date GA Lbr Simone/2nd Weight Sex Delivery Anes PTL Lv   2 Current            1 Term 20 37w0d 06:45 / 00:17 2.87 kg (6 lb 5.2 oz) F Vag-Spont EPI N GLORIA      Name: BHARATIFEMALE-CHANEL      Apgar1: 9  Apgar5: 9     Patient denies history of gestational hypertension, pre-eclampsia, gestational diabetes.    Gynecologic History:   Menstrual Interval: 28-30 days  Patient's last menstrual period was 2022.   STI history: neg  Last Pap: 21  History of abnormal pap: neg    Allergy: Patient has no known allergies.  Patient denies food, latex or environmental allergies.     Current Medications:  Current Outpatient Medications   Medication     Prenatal Vit-Fe Fumarate-FA (PRENATAL MULTIVITAMIN W/IRON) 27-0.8 MG tablet     No current facility-administered medications for this visit.     Facility-Administered Medications Ordered in Other Visits   Medication     fentaNYL (SUBLIMAZE) 2 mcg/mL, bupivacaine (MARCAINE) 0.125% in NaCl 0.9% EPIDURAL infusion     lidocaine-EPINEPHrine 1.5  %-1:200000 injection       Past Medical History:  Past Medical History:   Diagnosis Date     Varicella        Past Surgical History:  Past Surgical History:   Procedure Laterality Date     AS REPAIR CRUCIATE LIGAMENT,KNEE  2010     CERCLAGE CERVICAL N/A 1/7/2020    Procedure: CERCLAGE, CERVIX, VAGINAL APPROACH;  Surgeon: Ronny Rosario MD;  Location:  [facility]     [facility] SURGERY         Social History:  Patient lives with , daughter, as well as roommate.  Patient's relationship status is: .    Works as RN in NICU.   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  Roommate works for [employer].     Denies current tobacco, alcohol or recreational drug use.   She feels safe in her relationship. Patient denies history of sexual, physical or mental abuse.     Family History:  Family History   Problem Relation Age of Onset     Depression Mother      Anxiety Disorder Mother      Asthma Father      Rheumatoid Arthritis Father      Depression Brother      Anxiety Disorder Brother      Hyperlipidemia Brother      Diabetes Maternal Grandmother      Diabetes Maternal Grandfather      Heart Disease Maternal Grandfather      Diabetes Paternal Grandmother      Rheumatoid Arthritis Paternal Grandfather      Cancer Other         Paternal Grandfather     Glaucoma No family hx of      Macular Degeneration No family hx of        Review of Systems  Gen:  no change in weight, no fever, no chills, no fatigue  CV: no palpitations, no chest pain, no hypertension, no syncope  Resp: no shortness of breath, no cough, no wheezing, no asthma  GI: + nausea, no vomiting, no diarrhea, no constipation, no bloating, no GERD  :  no vaginal discharge, no dysuria, no abnormal bleeding, no pelvic pain   Endo: no thyroid problems, no cold/heat intolerance, no acne, no hirsutism, no diabetes  Heme: no easy bruising or bleeding, no history of DVT/PE/CVA  Neuro: no headaches, no seizures, no strokes, no focal  deficits      Physical Exam:  Vitals:    22 1302   BP: 118/79   Pulse: 78   SpO2: 100%   Weight: 80.3 kg (177 lb)     Body mass index is 29.01 kg/m .  Gen: alert, oriented, no distress,  pleasant, appears stated age, appropriately groomed  Neck: supple, trachea midline, no thyromegaly, no lymphadenopathy  HEENT: head normocephalic, atraumatic, normal oropharynx without erythema or exudates  CV: normal heart sounds, regular rate and rhythm, no murmurs  Resp: good inspiratory effort, lungs clear to ascultation bilaterally, no wheezes or rhonchi  Breast: symmetrical without masses, skin changes or nipple discharge.   Abd: soft, nontender, nondistended, normoactive bowel sounds,  no masses or organomegaly, no hernias, no scars  : normal external genitalia with lesions or erythema; normal, well supported urethra, normal Bartholins, normal Skenes; normal pink rugated vaginal mucosa, no lesions or abnormal discharge; Bimanual exam shows 10week sized uterus, mobile, no fundal tenderness, no CMT, no adnexal masses or tenderness. Cervix closed, WNL  Extr: warm, well perfused, nontender, no edema  Psych: affect bright, cooperative, responds appropriately      Assessment:  Chanel Vázquez is a 31 year old  at 10w1d presenting to establish prenatal care.    Problem List:   Hx cervical shortening and rescue cerclage      Plan:  1. Hx of rescue cerclage: Discussed I would expect MFM would offer history indicated cerclage.  Will place referral  2. Reviewed routine prenatal care. Discussed MD call schedule as well as role of residents and med students both in clinic and hospital.  She is okay with resident care  3. Pap: UTD   4. Diet, Nutrition and Exercise:  Continue PNVs. Continue normal exercise. Her prepregnancy BMI is 28.  According to the WHO guidelines, patient is given a goal of gaining approximately 15-25 pounds during the course of her pregnancy.    5. Immunizations: plan TdaP at 28 weeks  6. Fetal  anomaly screening: discussed.  ordered  7. Routine Prenatal Care: the patient will return to clinic in 4 weeks and prn    Brenda Greer MD

## 2022-03-19 LAB
RUBV IGG SERPL QL IA: 1.02 INDEX
RUBV IGG SERPL QL IA: POSITIVE
T PALLIDUM AB SER QL: NONREACTIVE

## 2022-03-20 LAB — BACTERIA UR CULT: NO GROWTH

## 2022-03-21 ENCOUNTER — TRANSCRIBE ORDERS (OUTPATIENT)
Dept: MATERNAL FETAL MEDICINE | Facility: CLINIC | Age: 32
End: 2022-03-21
Payer: COMMERCIAL

## 2022-03-21 DIAGNOSIS — O26.90 PREGNANCY RELATED CONDITION, ANTEPARTUM: Primary | ICD-10-CM

## 2022-03-21 DIAGNOSIS — Z98.890 HISTORY OF CERVICAL CERCLAGE: Primary | ICD-10-CM

## 2022-03-24 ENCOUNTER — PRE VISIT (OUTPATIENT)
Dept: MATERNAL FETAL MEDICINE | Facility: CLINIC | Age: 32
End: 2022-03-24
Payer: COMMERCIAL

## 2022-03-31 ENCOUNTER — OFFICE VISIT (OUTPATIENT)
Dept: MATERNAL FETAL MEDICINE | Facility: CLINIC | Age: 32
End: 2022-03-31
Attending: OBSTETRICS & GYNECOLOGY
Payer: COMMERCIAL

## 2022-03-31 ENCOUNTER — HOSPITAL ENCOUNTER (OUTPATIENT)
Dept: ULTRASOUND IMAGING | Facility: CLINIC | Age: 32
Discharge: HOME | End: 2022-03-31
Attending: OBSTETRICS & GYNECOLOGY
Payer: COMMERCIAL

## 2022-03-31 DIAGNOSIS — O26.90 PREGNANCY RELATED CONDITION, ANTEPARTUM: ICD-10-CM

## 2022-03-31 DIAGNOSIS — Z98.890 HISTORY OF CERVICAL CERCLAGE: ICD-10-CM

## 2022-03-31 DIAGNOSIS — Z98.890 HISTORY OF CERVICAL CERCLAGE: Primary | ICD-10-CM

## 2022-03-31 DIAGNOSIS — Z36.0 ENCOUNTER FOR ANTENATAL SCREENING FOR CHROMOSOMAL ANOMALIES: Primary | ICD-10-CM

## 2022-03-31 DIAGNOSIS — Z11.59 ENCOUNTER FOR SCREENING FOR OTHER VIRAL DISEASES: Primary | ICD-10-CM

## 2022-03-31 PROCEDURE — 76813 OB US NUCHAL MEAS 1 GEST: CPT | Mod: 26 | Performed by: OBSTETRICS & GYNECOLOGY

## 2022-03-31 PROCEDURE — 76813 OB US NUCHAL MEAS 1 GEST: CPT

## 2022-03-31 PROCEDURE — 36415 COLL VENOUS BLD VENIPUNCTURE: CPT | Performed by: GENETIC COUNSELOR, MS

## 2022-03-31 PROCEDURE — 99213 OFFICE O/P EST LOW 20 MIN: CPT | Mod: 25 | Performed by: OBSTETRICS & GYNECOLOGY

## 2022-03-31 PROCEDURE — 96040 HC GENETIC COUNSELING, EACH 30 MINUTES: CPT | Performed by: GENETIC COUNSELOR, MS

## 2022-03-31 NOTE — NURSING NOTE
Chanel here for GC, NT, and MFM consult due to prev preg with cerclage. Pt reports no FM yet, denies ctx, denies SROM, and denies vag bleeding.   in to talk with pt. Pt wishes to have cerclage paced. Pt to lab for blood work today. Pt was given instruction sheet and directions and cerclage scheduled for 4/8 at 8:30 am. Pt aware to be at hospital at 6:30 am. Order for Covid test placed. Pt scheduled for 18 weeks comp U/S and left amb and stable. Brenda Wolfe RN

## 2022-03-31 NOTE — PROGRESS NOTES
[facility] OB CONSULTATION VISIT    Patient Information:   Chanel Vázquez   : 1990  MRN: [id number]    Consultation requested by: Brenda Greer MD  Reason for consultation: History of exam-indicated cerclage; Desires history-indicated cerclage     Presentation History:   Chanel Vázquez is a 32 year old  at 12w1d by 8w6d US presenting for consultation due to a history of exam-indicated Damian cerclage placement at 20w in prior pregnancy.     In review, this patient has a history of an exam-indicated cerclage in her prior pregnancy () at 20w gestation. At the time, a routine anatomy ultrasound noted that her cervix was shortened to 0.6-0.8 cm with evidence of funneling. She was sent to Labor and Delivery for follow up and an exam, which showed that the cervix was 1cm dilated and mucus was protruding from the os. After discussion of options, she elected to proceed with an exam-indicated cerclage placement for. She underwent placement of a Damian cerclage in 2020, which was then removed at 36w in a scheduled fashion. Immediately after removal, her cervix was noted to be 5cm dilated. She was ultimately admitted for monitoring due to advanced cervical dilation in the  period, and given course of BMZ.  After five days of admission and upon reaching 37w gestation, her cervix was checked and noted to be 9cm. Augmentation was initiated, and the patient delivered a health male infant shortly thereafter. Due to the above noted history, the patient was offered consultation with [facility] for history-indicated cerclage placement during the current pregnancy. She thus presents to clinic today.     At this time, the patient feels well. Has discussed options of history-indicated cerclage placement with her primary OB/GYN, and is interested in pursuing this option in the near future. Denies vaginal bleed, leaking fluids, abdominal cramping. All other ROS negative.     Problem List:     Patient Active  Problem List    Diagnosis Date Noted     Need for Tdap vaccination 2022     Priority: Medium     History of cervical cerclage 2021     Priority: Medium     Suspected shortening of cervix not found 2020     Priority: Medium     Short cervical length during pregnancy 2020     Priority: Medium     Review of Allergies/Medical History/Medications:     Obstetrical History:    OB History    Para Term  AB Living   2 1 1 0 0 1   SAB IAB Ectopic Multiple Live Births   0 0 0 0 1      # Outcome Date GA Lbr Simone/2nd Weight Sex Delivery Anes PTL Lv   2 Current            1 Term 20 37w0d 06:45 / 00:17 2.87 kg (6 lb 5.2 oz) F Vag-Spont EPI N GLORIA      Name: JESÚS CALABRESE-NAOMI      Apgar1: 9  Apgar5: 9     Medical History:  Past Medical History:   Diagnosis Date     Varicella      Surgical History:  Past Surgical History:   Procedure Laterality Date     AS REPAIR CRUCIATE LIGAMENT,KNEE       CERCLAGE CERVICAL N/A 2020    Procedure: CERCLAGE, CERVIX, VAGINAL APPROACH;  Surgeon: Ronny Rosario MD;  Location: [facility] L+D     ELBOW SURGERY       Medications:     Current Outpatient Medications:      Prenatal Vit-Fe Fumarate-FA (PRENATAL MULTIVITAMIN W/IRON) 27-0.8 MG tablet, Take 1 tablet by mouth daily, Disp: , Rfl:   No current facility-administered medications for this visit.    Facility-Administered Medications Ordered in Other Visits:      fentaNYL (SUBLIMAZE) 2 mcg/mL, bupivacaine (MARCAINE) 0.125% in NaCl 0.9% EPIDURAL infusion, , EPIDURAL, Continuous PRN, Aren Russell MD, Last Rate: 10 mL/hr at 20, 10 mL/hr at 20     lidocaine-EPINEPHrine 1.5 %-[id number] injection, , EPIDURAL, PRN, Aren Russell MD, 3 mL at 20     Allergies:     No Known Allergies    Social History:    She  reports that she has quit smoking. Her smoking use included cigarettes. She smoked 0.00 packs per day. She has never used smokeless tobacco. She reports previous alcohol use. She  reports that she does not use drugs.     Family History:  Family History   Problem Relation Age of Onset     Depression Mother      Anxiety Disorder Mother      Asthma Father      Rheumatoid Arthritis Father      Depression Brother      Anxiety Disorder Brother      Hyperlipidemia Brother      Diabetes Maternal Grandmother      Diabetes Maternal Grandfather      Heart Disease Maternal Grandfather      Diabetes Paternal Grandmother      Rheumatoid Arthritis Paternal Grandfather      Cancer Other         Paternal Grandfather     Glaucoma No family hx of      Macular Degeneration No family hx of      Review of Systems:   All other ROS negative.    Physical Exam:     Vitals:  LMP 01/05/2022   Gen: Well appearing, no distress       Review of Labs/Diagnostics:     Recent Results (from the past 1008 hour(s))   Hepatitis C antibody    Collection Time: 03/18/22  2:10 PM   Result Value Ref Range    Hepatitis C Antibody Nonreactive Nonreactive   Urine Culture Aerobic Bacterial    Collection Time: 03/18/22  2:10 PM    Specimen: Urine, Midstream   Result Value Ref Range    Culture No Growth    Treponema Abs w Reflex to RPR and Titer    Collection Time: 03/18/22  2:10 PM   Result Value Ref Range    Treponema Antibody Total Nonreactive Nonreactive   Rubella Antibody IgG Quantitative    Collection Time: 03/18/22  2:10 PM   Result Value Ref Range    Rubella Christin IgG Instrument Value 1.02 >=1.00 Index    Rubella Antibody IgG Positive Positive   HIV Antigen Antibody Combo    Collection Time: 03/18/22  2:10 PM   Result Value Ref Range    HIV Antigen Antibody Combo Nonreactive Nonreactive   CBC with platelets    Collection Time: 03/18/22  2:10 PM   Result Value Ref Range    WBC Count 9.3 4.0 - 11.0 10e3/uL    RBC Count 4.12 3.80 - 5.20 10e6/uL    Hemoglobin 13.0 11.7 - 15.7 g/dL    Hematocrit 38.1 35.0 - 47.0 %    MCV 93 78 - 100 fL    MCH 31.6 26.5 - 33.0 pg    MCHC 34.1 31.5 - 36.5 g/dL    RDW 12.7 10.0 - 15.0 %    Platelet Count 199  150 - 450 10e3/uL   Hepatitis B surface antigen    Collection Time: 22  2:10 PM   Result Value Ref Range    Hepatitis B Surface Antigen Nonreactive Nonreactive   UA without Microscopic - lab collect    Collection Time: 22  2:10 PM   Result Value Ref Range    Color Urine Yellow Colorless, Straw, Light Yellow, Yellow    Appearance Urine Clear Clear    Glucose Urine Negative Negative mg/dL    Bilirubin Urine Negative Negative    Ketones Urine Negative Negative mg/dL    Specific Gravity Urine 1.025 1.003 - 1.035    Blood Urine Negative Negative    pH Urine 6.0 5.0 - 7.0    Protein Albumin Urine Negative Negative mg/dL    Urobilinogen Urine 0.2 0.2, 1.0 E.U./dL    Nitrite Urine Negative Negative    Leukocyte Esterase Urine Trace (A) Negative   Adult Type and Screen    Collection Time: 22  2:10 PM   Result Value Ref Range    ABO/RH(D) A POS     Antibody Screen Negative Negative    SPECIMEN EXPIRATION DATE [id number]      Please see supervision of high risk pregnancy under problem list for prenatal labs and ultrasound.       Assessment and Plan:   Chanel Vázquez is a 32 year old  at 12w1d by 8w6d US presenting for  consultation due to a history of exam-indicated cerclage placement at 20w in her prior pregnancy.     History of Suspected Cervical Insufficiency  History Exam-Indicated Cerclage Placement at 20w Gestation   Desires History-Indicated Cerclage   - Today we discussed the incidence and epidemiology of  birth (PTB).  There are multiple etiologies of spontaneous PTB, including but not limited to infection, bleeding, uterine distension, cervical insufficiency and stress. However, most spontaneous  deliveries occur in patients with no risk factors. A history of prior  delivery is a significant risk factor for recurrence in a subsequent pregnancy.   - We also discussed the concept of cervical insufficiency which is another cause of  birth and is  classically described as painless cervical dilation.     Given the patient's history of exam-indicated cerclage placement, ew discussed the patient's options for management during this pregnancy. These include:  - Cervical cerclage:  We discussed the role of a history-indicated cerclage in this pregnancy, given possible cervical insufficiency. A prophylactic cerclage is typically placed at the end of the first trimester (12 to 14 weeks of gestation) to prevent early  delivery.   - Serial cervical lengths:  We discussed the option of transvaginal ultrasounds for cervical length. If performed, we could follow cervical lengths weekly from 16 -23w gestation. If shortening is noted (i.e. <25 mm) we would proceed with ultrasound-indicated cerclage.  Again we reviewed that this strategy may help her avoid a cerclage placement.  - After conversation with the patient, she would like to proceed with placement of a history-indicated prophylactic cerclage. Would prefer placement on 22 if possible due her work schedule, will attempt to schedule accordingly. Discussed procedure itself, spinal anesthetic, as well as postoperative fetal evaluation with heart tones.   - Will plan for routine Anatomy US at 20w, but no need for additional transvaginal imaging unless the patient had symptoms (bleeding, leaking fluids, abdominal cramping pain, ect.).    Recommendations:  - History-indicated cerclage at 12-13 weeks, per the patient's request - to schedule patient within the next week, preferably on 22   - Clinical evaluation of  labor symptoms.    - Follow up Anatomy US at ~20w - scheduled for 22.     Patient seen and care plan discussed with Dr. Conte. Cerclage placement scheduled for 22. Plan for routine Anatomy US with clinic visit therefter at ~20w, scheduled for 22.     Opal Hernandez MD   OB/GYN PGY-2  Maternal Fetal Medicine Service   22

## 2022-03-31 NOTE — PROGRESS NOTES
[facility]  Genetic Counseling Consult    Patient: Chanel Vázquez YOB: 1990   Date of Service: 3/31/22      Chanel Vázquez was seen at [facility] for genetic consultation to discuss the options for routine screening for fetal chromosome abnormalities. Chanel was accompanied to today's visit by her partner, Valentino.       Impression/Plan:   1.  Chanel had an ultrasound and blood draw for NIPT (NIPS test through [facility]).  Results are expected within 5-10 days, and will be available in [facility].  We will contact her to discuss the results, and a copy will be forwarded to the office of the referring OB provider. Chanel would prefer a vague voicemail with results if she cannot be reached. She would like the predicted fetal sex mailed to her.     2. Maternal serum AFP (single marker screen) is recommended after 15 weeks to screen for open neural tube defects. A quad screen should not be performed.    3. Chanel had an [facility] physician consult to discuss her history of rescue cerclage placement. Please see [facility] consult note for further information.     Pregnancy History:   /Parity:    Age at Delivery: 32 year old  JUAN C: 10/12/2022, by Last Menstrual Period  Gestational Age: 12w1d    No significant complications or exposures were reported in the current pregnancy.    Chanel s pregnancy history is significant for:  o  full-term delivery at 37 weeks. Rescue cerclage placed in that pregnancy due to cervical funneling.   o  chemical pregnancy.     Medical History:   Chanel has a history of cervical insufficiency with a rescue cerclage placed in her last pregnancy. Chanel met with our [facility] physician to discuss this history in greater detail today. Please see corresponding documentation for additional details.        Family History:   A three-generation pedigree was obtained, and is scanned under the  Media   tab.   The following significant findings were reported by Chanel:    Chanel has one full brother and one maternal half-brother. Both are living and doing well.     Chanel's father and paternal grandfather both have rheumatoid arthritis (RA). RA is an autoimmune condition that is generally thought to be a multifactorial condition. This means that there are many genetic and non-genetic factors that contribute to the development of the condition. Recurrence risk is likely higher among first-degree relatives of the affected individual and decreases with distance in relationship to other second-degree and third-degree relatives.      Chanel's partner, Valentino, is 30 and healthy. He has one sister who has children, both alive and well.     Valentino has one maternal aunt who had breast cancer in her 40s. No other family history of cancer was reported. We discussed how most cancer seen in families occurs sporadically, but some may be due to an underlying genetic etiology. Although early-onset breast cancer is a red flag for a hereditary cancer syndrome, there is no other family history of cancer. Thus, the family history is not overly suspicious of an underlying hereditary cancer predisposition.      Valentino has one first-cousin who passed away after delivery due to complications of Potter sequence. Potter sequence is a rare condition in which there is little or no amniotic fluid. This can lead to skeletal anomalies, distinctive facial features, and pulmonary hypoplasia. Most commonly, the underlying etiology of the sequence is bilateral renal agenesis or other kidney anomalies. Sometimes it can be caused by polycystic kidney disease. Most often, the etiology is unknown. However, if the underlying condition is due to a specific feature (such as polycystic kidney disease), it may be due to specific genetic mutations. If Valentino's cousin had autosomal recessive polycystic kidney disease, then Valentino would have a 1 in 4 chance to be a  carrier of the condition.     Otherwise, the reported family history is negative for multiple miscarriages, stillbirths, birth defects, intellectual disability, known genetic conditions, and consanguinity.       Carrier Screening:   The patient reports that she and the father of the pregnancy have  ancestry:      Cystic fibrosis is an autosomal recessive genetic condition that occurs with increased frequency in individuals of  ancestry and carrier screening for this condition is available.  In addition,  screening in the [facility] includes cystic fibrosis.    Expanded carrier screening for mutations in a large panel of genes associated with autosomal recessive conditions including cystic fibrosis, thalassemias, hearing loss, spinal muscular atrophy, and others, is now available. We also reviewed that expanded carrier screening can assess for common X-linked recessive disorders such as Fragile X syndrome.     We discussed that expanded carrier screening is designed to identify carrier status for conditions that are primarily childhood or adolescent onset. Expanded carrier screening does not evaluate for adult-onset conditions such as hereditary cancer syndromes, dementia/ Alzheimer's disease, or cardiovascular disease risk factors. Additionally, expanded carrier screening is not comprehensive for all known genetic diseases or inherited conditions.       The patient has declined the carrier screening options reviewed today.       Risk Assessment for Chromosome Conditions:   We explained that the risk for fetal chromosome abnormalities increases with maternal age. We discussed specific features of common chromosome abnormalities, including Down syndrome, trisomy 13, trisomy 18, and sex chromosome conditions.      - At age 32 at midtrimester, the risk to have a baby with Down syndrome is 1 in 690.     - At age 32 at midtrimester, the risk to have a baby with any chromosome abnormality is  1 in 345.        Testing Options:   We discussed the following options:   First trimester screening    First trimester ultrasound with nuchal translucency and nasal bone assessments, maternal plasma hCG, EVERETTE-A, and AFP measurement    Screens for fetal trisomy 21, trisomy 13, and trisomy 18    Cannot screen for open neural tube defects; maternal serum AFP after 15 weeks is recommended     Non-invasive Prenatal Testing (NIPT)    Maternal plasma cell-free DNA testing; first trimester ultrasound with nuchal translucency and nasal bone assessment is recommended, when appropriate    Screens for fetal trisomy 21, trisomy 13, trisomy 18, and sex chromosome aneuploidy    Cannot screen for open neural tube defects; maternal serum AFP after 15 weeks is recommended     Chorionic villus sampling (CVS)    Invasive procedure typically performed in the first trimester by which placental villi are obtained for the purpose of chromosome analysis and/or other prenatal genetic analysis    Diagnostic results; >99% sensitivity for fetal chromosome abnormalities    Cannot test for open neural tube defects; maternal serum AFP after 15 weeks is recommended     Genetic Amniocentesis    Invasive procedure typically performed in the second trimester by which amniotic fluid is obtained for the purpose of chromosome analysis and/or other prenatal genetic analysis    Diagnostic results; >99% sensitivity for fetal chromosome abnormalities    AFAFP measurement tests for open neural tube defects     Comprehensive (Level II) ultrasound: Detailed ultrasound performed between 18-22 weeks gestation to screen for major birth defects and markers for aneuploidy.        We reviewed the benefits and limitations of this testing.  Screening tests provide a risk assessment specific to the pregnancy for certain fetal chromosome abnormalities, but cannot definitively diagnose or exclude a fetal chromosome abnormality.  Follow-up genetic counseling and  consideration of diagnostic testing is recommended with any abnormal screening result.     Diagnostic tests carry inherent risks- including risk of miscarriage- that require careful consideration.  These tests can detect fetal chromosome abnormalities with greater than 99% certainty.  Results can be compromised by maternal cell contamination or mosaicism, and are limited by the resolution of cytogenetic G-banding technology.  There is no screening nor diagnostic test that can detect all forms of birth defects or mental disability.     It was a pleasure to be involved with Chanel west. Face-to-face time of the meeting was 35 minutes.    Patricia Graves MS, formerly [facility]  Licensed Genetic Counselor  [facility]  Maternal Fetal Medicine  [email or web address]  [phone]

## 2022-04-07 ENCOUNTER — ANESTHESIA EVENT (OUTPATIENT)
Dept: OBGYN | Facility: CLINIC | Age: 32
End: 2022-04-07
Payer: COMMERCIAL

## 2022-04-07 ENCOUNTER — TELEPHONE (OUTPATIENT)
Dept: MATERNAL FETAL MEDICINE | Facility: CLINIC | Age: 32
End: 2022-04-07
Payer: COMMERCIAL

## 2022-04-07 LAB — SCANNED LAB RESULT: NORMAL

## 2022-04-07 NOTE — TELEPHONE ENCOUNTER
April 7, 2022    I spoke with Chanel regarding her NIPT results.     Results indicate NO ANEUPLOIDY DETECTED for chromosomes 21, 18, 13, or the sex chromosomes. Predicted fetal sex was emailed to Chanel as previously requested.     This puts her current pregnancy at low risk for Down syndrome, trisomy 18, trisomy 13 and sex chromosome abnormalities. This test is reported to have the following sensitivities: Down syndrome- >99.9%, trisomy 18- >99.9%, and trisomy 13- >99.9%. Although these results are reassuring, this does not replace a standard chromosome analysis from a chorionic villus sampling or amniocentesis.      MSAFP is the appropriate second trimester screening test for open neural tube defects; the maternal quad screen is not recommended.    Her results are available in her Epic chart for her primary OB to review.     Patricia Graves MS, [facility]  Licensed Genetic Counselor  [facility]  Maternal Fetal Medicine  [email or web address]  [phone]

## 2022-04-07 NOTE — ANESTHESIA PREPROCEDURE EVALUATION
Anesthesia Pre-Procedure Evaluation    Patient: Chanel Vázquez   MRN: [id number] : 1990        Procedure : Procedure(s):  CERCLAGE, CERVIX, VAGINAL APPROACH          Past Medical History:   Diagnosis Date     Varicella       Past Surgical History:   Procedure Laterality Date     AS REPAIR CRUCIATE LIGAMENT,KNEE  2010     CERCLAGE CERVICAL N/A 2020    Procedure: CERCLAGE, CERVIX, VAGINAL APPROACH;  Surgeon: Ronny Rosario MD;  Location: [facility] L+D     ELBOW SURGERY        No Known Allergies   Social History     Tobacco Use     Smoking status: Former Smoker     Packs/day: 0.00     Types: Cigarettes     Smokeless tobacco: Never Used   Substance Use Topics     Alcohol use: Not Currently     Comment: Occasional      Wt Readings from Last 1 Encounters:   22 80.3 kg (177 lb)        Anesthesia Evaluation   Pt has had prior anesthetic. Type: Regional and OB Labor Epidural.    No history of anesthetic complications       ROS/MED HX  ENT/Pulmonary:  - neg pulmonary ROS     Neurologic:  - neg neurologic ROS     Cardiovascular:  - neg cardiovascular ROS     METS/Exercise Tolerance:     Hematologic:  - neg hematologic  ROS     Musculoskeletal:       GI/Hepatic:  - neg GI/hepatic ROS     Renal/Genitourinary:       Endo:  - neg endo ROS     Psychiatric/Substance Use:  - neg psychiatric ROS     Infectious Disease:       Malignancy:       Other:   - History indicated cerclage placement  - Hx of cervical insufficiency,   (-) previous        Physical Exam    Airway        Mallampati: I   TM distance: > 3 FB   Neck ROM: full   Mouth opening: > 3 cm    Respiratory Devices and Support         Dental  no notable dental history         Cardiovascular   cardiovascular exam normal          Pulmonary   pulmonary exam normal                OUTSIDE LABS:  CBC:   Lab Results   Component Value Date    WBC 9.3 2022    WBC 9.8 2020    HGB 13.0 2022    HGB 11.3 (L) 2020    HCT 38.1 2022     HCT 34.4 (L) 05/04/2020     03/18/2022     (L) 05/04/2020     BMP:   Lab Results   Component Value Date    CR 0.69 05/01/2020    GLC 96 12/23/2021     COAGS: No results found for: PTT, INR, FIBR  POC:   Lab Results   Component Value Date    HCG Negative 07/08/2020     HEPATIC:   Lab Results   Component Value Date    ALT 28 05/01/2020    AST 27 05/01/2020     OTHER: No results found for: PH, LACT, A1C, ANOOP, PHOS, MAG, LIPASE, AMYLASE, TSH, T4, T3, CRP, SED    Anesthesia Plan    ASA Status:  2      Anesthesia Type: Spinal.              Consents    Anesthesia Plan(s) and associated risks, benefits, and realistic alternatives discussed. Questions answered and patient/representative(s) expressed understanding.    - Discussed:     - Discussed with:  Patient, Spouse      - Extended Intubation/Ventilatory Support Discussed: No.      - Patient is DNR/DNI Status: No    Use of blood products discussed: No .     Postoperative Care    Pain management: Neuraxial analgesia.        Comments:           H&P reviewed: Unable to attach VIRTUAL H&P to encounter due to EHR limitations. Appropriate H&P reviewed. The physical exam performed by anesthesia during this surgical encounter serves as the physical portion of that virtual H&P.  Any significant changes noted within this preop evaluation.     neg OB ROS.       Jeffrey Colbert MD

## 2022-04-08 ENCOUNTER — ANESTHESIA (OUTPATIENT)
Dept: OBGYN | Facility: CLINIC | Age: 32
End: 2022-04-08
Payer: COMMERCIAL

## 2022-04-08 ENCOUNTER — HOSPITAL ENCOUNTER (OUTPATIENT)
Facility: CLINIC | Age: 32
Discharge: HOME | End: 2022-04-08
Attending: OBSTETRICS & GYNECOLOGY | Admitting: OBSTETRICS & GYNECOLOGY
Payer: COMMERCIAL

## 2022-04-08 ENCOUNTER — HOSPITAL ENCOUNTER (OUTPATIENT)
Facility: CLINIC | Age: 32
End: 2022-04-08
Admitting: OBSTETRICS & GYNECOLOGY
Payer: COMMERCIAL

## 2022-04-08 VITALS
DIASTOLIC BLOOD PRESSURE: 72 MMHG | HEIGHT: 64 IN | OXYGEN SATURATION: 99 % | TEMPERATURE: 97.8 F | SYSTOLIC BLOOD PRESSURE: 111 MMHG | BODY MASS INDEX: 30.05 KG/M2 | WEIGHT: 176 LBS | HEART RATE: 65 BPM | RESPIRATION RATE: 18 BRPM

## 2022-04-08 PROBLEM — Z87.51 HISTORY OF PRETERM DELIVERY: Status: ACTIVE | Noted: 2022-04-08

## 2022-04-08 LAB
ABO/RH(D): NORMAL
ANTIBODY SCREEN: NEGATIVE
BASOPHILS # BLD AUTO: 0 10E3/UL (ref 0–0.2)
BASOPHILS NFR BLD AUTO: 0 %
EOSINOPHIL # BLD AUTO: 0.1 10E3/UL (ref 0–0.7)
EOSINOPHIL NFR BLD AUTO: 2 %
ERYTHROCYTE [DISTWIDTH] IN BLOOD BY AUTOMATED COUNT: 12.2 % (ref 10–15)
HCT VFR BLD AUTO: 36.3 % (ref 35–47)
HGB BLD-MCNC: 12.6 G/DL (ref 11.7–15.7)
IMM GRANULOCYTES # BLD: 0 10E3/UL
IMM GRANULOCYTES NFR BLD: 0 %
LYMPHOCYTES # BLD AUTO: 1.8 10E3/UL (ref 0.8–5.3)
LYMPHOCYTES NFR BLD AUTO: 26 %
MCH RBC QN AUTO: 31.5 PG (ref 26.5–33)
MCHC RBC AUTO-ENTMCNC: 34.7 G/DL (ref 31.5–36.5)
MCV RBC AUTO: 91 FL (ref 78–100)
MONOCYTES # BLD AUTO: 0.6 10E3/UL (ref 0–1.3)
MONOCYTES NFR BLD AUTO: 9 %
NEUTROPHILS # BLD AUTO: 4.2 10E3/UL (ref 1.6–8.3)
NEUTROPHILS NFR BLD AUTO: 63 %
NRBC # BLD AUTO: 0 10E3/UL
NRBC BLD AUTO-RTO: 0 /100
PLATELET # BLD AUTO: 158 10E3/UL (ref 150–450)
RBC # BLD AUTO: 4 10E6/UL (ref 3.8–5.2)
SPECIMEN EXPIRATION DATE: NORMAL
WBC # BLD AUTO: 6.7 10E3/UL (ref 4–11)

## 2022-04-08 PROCEDURE — 250N000011 HC RX IP 250 OP 636: Performed by: STUDENT IN AN ORGANIZED HEALTH CARE EDUCATION/TRAINING PROGRAM

## 2022-04-08 PROCEDURE — 999N000141 HC STATISTIC PRE-PROCEDURE NURSING ASSESSMENT: Performed by: OBSTETRICS & GYNECOLOGY

## 2022-04-08 PROCEDURE — 710N000010 HC RECOVERY PHASE 1, LEVEL 2, PER MIN: Performed by: OBSTETRICS & GYNECOLOGY

## 2022-04-08 PROCEDURE — 258N000003 HC RX IP 258 OP 636: Performed by: STUDENT IN AN ORGANIZED HEALTH CARE EDUCATION/TRAINING PROGRAM

## 2022-04-08 PROCEDURE — 250N000013 HC RX MED GY IP 250 OP 250 PS 637: Performed by: STUDENT IN AN ORGANIZED HEALTH CARE EDUCATION/TRAINING PROGRAM

## 2022-04-08 PROCEDURE — 360N000074 HC SURGERY LEVEL 1, PER MIN: Performed by: OBSTETRICS & GYNECOLOGY

## 2022-04-08 PROCEDURE — 272N000001 HC OR GENERAL SUPPLY STERILE: Performed by: OBSTETRICS & GYNECOLOGY

## 2022-04-08 PROCEDURE — 86850 RBC ANTIBODY SCREEN: CPT | Performed by: STUDENT IN AN ORGANIZED HEALTH CARE EDUCATION/TRAINING PROGRAM

## 2022-04-08 PROCEDURE — 59320 REVISION OF CERVIX: CPT | Mod: GC | Performed by: OBSTETRICS & GYNECOLOGY

## 2022-04-08 PROCEDURE — 370N000017 HC ANESTHESIA TECHNICAL FEE, PER MIN: Performed by: OBSTETRICS & GYNECOLOGY

## 2022-04-08 PROCEDURE — 86901 BLOOD TYPING SEROLOGIC RH(D): CPT | Performed by: STUDENT IN AN ORGANIZED HEALTH CARE EDUCATION/TRAINING PROGRAM

## 2022-04-08 PROCEDURE — 85025 COMPLETE CBC W/AUTO DIFF WBC: CPT | Performed by: STUDENT IN AN ORGANIZED HEALTH CARE EDUCATION/TRAINING PROGRAM

## 2022-04-08 PROCEDURE — 36415 COLL VENOUS BLD VENIPUNCTURE: CPT | Performed by: STUDENT IN AN ORGANIZED HEALTH CARE EDUCATION/TRAINING PROGRAM

## 2022-04-08 RX ORDER — FENTANYL CITRATE 50 UG/ML
25 INJECTION, SOLUTION INTRAMUSCULAR; INTRAVENOUS
Status: DISCONTINUED | OUTPATIENT
Start: 2022-04-08 | End: 2022-04-08 | Stop reason: HOSPADM

## 2022-04-08 RX ORDER — FENTANYL CITRATE-0.9 % NACL/PF 10 MCG/ML
100 PLASTIC BAG, INJECTION (ML) INTRAVENOUS EVERY 5 MIN PRN
Status: DISCONTINUED | OUTPATIENT
Start: 2022-04-08 | End: 2022-04-08 | Stop reason: HOSPADM

## 2022-04-08 RX ORDER — ONDANSETRON 2 MG/ML
INJECTION INTRAMUSCULAR; INTRAVENOUS PRN
Status: DISCONTINUED | OUTPATIENT
Start: 2022-04-08 | End: 2022-04-08

## 2022-04-08 RX ORDER — ONDANSETRON 2 MG/ML
4 INJECTION INTRAMUSCULAR; INTRAVENOUS EVERY 30 MIN PRN
Status: DISCONTINUED | OUTPATIENT
Start: 2022-04-08 | End: 2022-04-08 | Stop reason: HOSPADM

## 2022-04-08 RX ORDER — ACETAMINOPHEN 325 MG/1
650 TABLET ORAL
Status: CANCELLED | OUTPATIENT
Start: 2022-04-08

## 2022-04-08 RX ORDER — NALOXONE HYDROCHLORIDE 0.4 MG/ML
0.2 INJECTION, SOLUTION INTRAMUSCULAR; INTRAVENOUS; SUBCUTANEOUS
Status: DISCONTINUED | OUTPATIENT
Start: 2022-04-08 | End: 2022-04-08 | Stop reason: HOSPADM

## 2022-04-08 RX ORDER — SODIUM CHLORIDE, SODIUM LACTATE, POTASSIUM CHLORIDE, CALCIUM CHLORIDE 600; 310; 30; 20 MG/100ML; MG/100ML; MG/100ML; MG/100ML
INJECTION, SOLUTION INTRAVENOUS CONTINUOUS
Status: DISCONTINUED | OUTPATIENT
Start: 2022-04-08 | End: 2022-04-08 | Stop reason: HOSPADM

## 2022-04-08 RX ORDER — ACETAMINOPHEN 325 MG/1
975 TABLET ORAL ONCE
Status: COMPLETED | OUTPATIENT
Start: 2022-04-08 | End: 2022-04-08

## 2022-04-08 RX ORDER — MEPERIDINE HYDROCHLORIDE 25 MG/ML
12.5 INJECTION INTRAMUSCULAR; INTRAVENOUS; SUBCUTANEOUS
Status: DISCONTINUED | OUTPATIENT
Start: 2022-04-08 | End: 2022-04-08 | Stop reason: HOSPADM

## 2022-04-08 RX ORDER — OXYCODONE HYDROCHLORIDE 5 MG/1
5 TABLET ORAL EVERY 4 HOURS PRN
Status: DISCONTINUED | OUTPATIENT
Start: 2022-04-08 | End: 2022-04-08 | Stop reason: HOSPADM

## 2022-04-08 RX ORDER — CITRIC ACID/SODIUM CITRATE 334-500MG
30 SOLUTION, ORAL ORAL ONCE
Status: COMPLETED | OUTPATIENT
Start: 2022-04-08 | End: 2022-04-08

## 2022-04-08 RX ORDER — NALOXONE HYDROCHLORIDE 0.4 MG/ML
0.4 INJECTION, SOLUTION INTRAMUSCULAR; INTRAVENOUS; SUBCUTANEOUS
Status: DISCONTINUED | OUTPATIENT
Start: 2022-04-08 | End: 2022-04-08 | Stop reason: HOSPADM

## 2022-04-08 RX ORDER — FENTANYL CITRATE 50 UG/ML
25 INJECTION, SOLUTION INTRAMUSCULAR; INTRAVENOUS EVERY 5 MIN PRN
Status: DISCONTINUED | OUTPATIENT
Start: 2022-04-08 | End: 2022-04-08 | Stop reason: HOSPADM

## 2022-04-08 RX ORDER — ONDANSETRON 4 MG/1
4 TABLET, ORALLY DISINTEGRATING ORAL EVERY 30 MIN PRN
Status: DISCONTINUED | OUTPATIENT
Start: 2022-04-08 | End: 2022-04-08 | Stop reason: HOSPADM

## 2022-04-08 RX ORDER — INDOMETHACIN 25 MG/1
50 CAPSULE ORAL ONCE
Status: COMPLETED | OUTPATIENT
Start: 2022-04-08 | End: 2022-04-08

## 2022-04-08 RX ORDER — CITRIC ACID/SODIUM CITRATE 334-500MG
30 SOLUTION, ORAL ORAL ONCE
Status: DISCONTINUED | OUTPATIENT
Start: 2022-04-08 | End: 2022-04-08 | Stop reason: HOSPADM

## 2022-04-08 RX ORDER — HYDROMORPHONE HCL IN WATER/PF 6 MG/30 ML
0.2 PATIENT CONTROLLED ANALGESIA SYRINGE INTRAVENOUS EVERY 5 MIN PRN
Status: DISCONTINUED | OUTPATIENT
Start: 2022-04-08 | End: 2022-04-08 | Stop reason: HOSPADM

## 2022-04-08 RX ORDER — NALBUPHINE HYDROCHLORIDE 10 MG/ML
2.5-5 INJECTION, SOLUTION INTRAMUSCULAR; INTRAVENOUS; SUBCUTANEOUS EVERY 6 HOURS PRN
Status: DISCONTINUED | OUTPATIENT
Start: 2022-04-08 | End: 2022-04-08 | Stop reason: HOSPADM

## 2022-04-08 RX ORDER — ONDANSETRON 4 MG/1
4 TABLET, ORALLY DISINTEGRATING ORAL
Status: CANCELLED | OUTPATIENT
Start: 2022-04-08

## 2022-04-08 RX ORDER — BUPIVACAINE HYDROCHLORIDE 7.5 MG/ML
INJECTION, SOLUTION INTRASPINAL
Status: COMPLETED | OUTPATIENT
Start: 2022-04-08 | End: 2022-04-08

## 2022-04-08 RX ADMIN — ONDANSETRON 4 MG: 2 INJECTION INTRAMUSCULAR; INTRAVENOUS at 09:02

## 2022-04-08 RX ADMIN — ACETAMINOPHEN 975 MG: 325 TABLET ORAL at 08:18

## 2022-04-08 RX ADMIN — PHENYLEPHRINE HYDROCHLORIDE 100 MCG: 10 INJECTION INTRAVENOUS at 08:45

## 2022-04-08 RX ADMIN — INDOMETHACIN 50 MG: 25 CAPSULE ORAL at 12:22

## 2022-04-08 RX ADMIN — SODIUM CITRATE AND CITRIC ACID MONOHYDRATE 30 ML: 500; 334 SOLUTION ORAL at 08:19

## 2022-04-08 RX ADMIN — SODIUM CHLORIDE, POTASSIUM CHLORIDE, SODIUM LACTATE AND CALCIUM CHLORIDE: 600; 310; 30; 20 INJECTION, SOLUTION INTRAVENOUS at 08:20

## 2022-04-08 RX ADMIN — BUPIVACAINE HYDROCHLORIDE IN DEXTROSE 1.2 ML: 7.5 INJECTION, SOLUTION SUBARACHNOID at 08:45

## 2022-04-08 ASSESSMENT — ACTIVITIES OF DAILY LIVING (ADL)
WEAR_GLASSES_OR_BLIND: NO
WALKING_OR_CLIMBING_STAIRS_DIFFICULTY: NO
DOING_ERRANDS_INDEPENDENTLY_DIFFICULTY: NO
FALL_HISTORY_WITHIN_LAST_SIX_MONTHS: NO
DIFFICULTY_EATING/SWALLOWING: NO
DRESSING/BATHING_DIFFICULTY: NO
DIFFICULTY_COMMUNICATING: NO
CHANGE_IN_FUNCTIONAL_STATUS_SINCE_ONSET_OF_CURRENT_ILLNESS/INJURY: NO
TOILETING_ISSUES: NO
CONCENTRATING,_REMEMBERING_OR_MAKING_DECISIONS_DIFFICULTY: NO
HEARING_DIFFICULTY_OR_DEAF: NO

## 2022-04-08 NOTE — ANESTHESIA CARE TRANSFER NOTE
Patient: Chanel Vázquez    Procedure: Procedure(s):  CERCLAGE, CERVIX, VAGINAL APPROACH       Diagnosis: History of cervical cerclage [Z98.890]  Diagnosis Additional Information: No value filed.    Anesthesia Type:   Spinal     Note:    Oropharynx: oropharynx clear of all foreign objects  Level of Consciousness: awake  Oxygen Supplementation: room air    Independent Airway: airway patency satisfactory and stable  Dentition: dentition unchanged  Vital Signs Stable: post-procedure vital signs reviewed and stable  Report to RN Given: handoff report given  Patient transferred to: PACU  Comments: VSS. Breathing spontaneously at a regular rate with adequate tidal volumes and maintaining O2 sats. Denies nausea or pain. No apparent complications from anesthesia.     Jeffrey Colbert MD   Anesthesia CA-1    Handoff Report: Identifed the Patient, Identified the Reponsible Provider, Reviewed the pertinent medical history, Discussed the surgical course, Reviewed Intra-OP anesthesia mangement and issues during anesthesia, Set expectations for post-procedure period and Allowed opportunity for questions and acknowledgement of understanding      Vitals:  Vitals Value Taken Time   BP     Temp     Pulse     Resp     SpO2 100 % 04/08/22 0931   Vitals shown include unvalidated device data.    Electronically Signed By: Jeffrey Colbert MD  April 8, 2022  9:33 AM

## 2022-04-08 NOTE — PLAN OF CARE
Goal Outcome Evaluation:  Chanel reported some cramping and pressure. Initially unable to void. Indocin given. An hour later chanel was able to void. Reports the cramping was much better and the pressure sensation had gone away. Chanel ate lunch. Dr Hua came by to see patient. Plan is to discharge pt.   Pt educated on discharge instructions and given written handout, verbalized understanding of instructions. Discussed using warm packs for cramping and taking it easy this weekend. Patient will be following up in clinic with her scheduled appointment and knows to call the clinic if she has any concerns or questions .Patient discharged ambulatory at 1400.

## 2022-04-08 NOTE — ANESTHESIA PROCEDURE NOTES
Intrathecal injection Procedure Note    Pre-Procedure   Staff -        Anesthesiologist:  Corinne Davey MD       Resident/Fellow: Jeffrey Colbert MD       Performed By: resident       Location: OR       Pre-Anesthestic Checklist: patient identified, IV checked, risks and benefits discussed, informed consent, monitors and equipment checked, pre-op evaluation, at physician/surgeon's request and post-op pain management  Timeout:       Correct Patient: Yes        Correct Procedure: Yes        Correct Site: Yes        Correct Position: Yes   Procedure Documentation  Procedure: intrathecal injection       Diagnosis: spinal anesthesia        Patient Position: sitting       Patient Prep/Sterile Barriers: sterile gloves, mask, patient draped       Skin prep: Chloraprep       Insertion Site: L3-4. (midline approach).       Needle Gauge: 25.        Needle Length (Inches): 3.5        Spinal Needle Type: Pencan       Introducer used       Introducer: 20 G       # of attempts: 1 and  # of redirects:  0    Assessment/Narrative         Paresthesias: No.       Sensory Level: T12       CSF fluid: clear.    Medication(s) Administered   0.75% Hyperbaric Bupivacaine (Intrathecal), 1.2 mL  Medication Administration Time: 4/8/2022 8:45 AM

## 2022-04-08 NOTE — OR NURSING
Data: Pt to OB PACU at 0936 via cart. PIV infusing without complications, pt denies any pain, denies any nausea and vomiting.  Interventions: IV to pump, monitors and alarms on, SCD on.  Response: stable.  Plan: Patient instructed to notify RN for pain or nausea, routine post op cares.

## 2022-04-08 NOTE — ANESTHESIA POSTPROCEDURE EVALUATION
Patient: Chanel Vázquez    Procedure: Procedure(s):  CERCLAGE, CERVIX, VAGINAL APPROACH       Anesthesia Type:  Spinal    Note:  Disposition: Admission   Postop Pain Control: Uneventful            Sign Out: Well controlled pain   PONV: No   Neuro/Psych: Uneventful            Sign Out: Acceptable/Baseline neuro status   Airway/Respiratory: Uneventful            Sign Out: Acceptable/Baseline resp. status   CV/Hemodynamics: Uneventful            Sign Out: Acceptable CV status; No obvious hypovolemia; No obvious fluid overload   Other NRE: NONE   DID A NON-ROUTINE EVENT OCCUR? No           Last vitals:  Vitals Value Taken Time   BP 99/79 04/08/22 1021   Temp 36.7  C (98.1  F) 04/08/22 1015   Pulse 59 04/08/22 1028   Resp 16 04/08/22 1020   SpO2 99 % 04/08/22 1028   Vitals shown include unvalidated device data.    Electronically Signed By: Corinne Davey MD  April 8, 2022  1:54 PM

## 2022-04-08 NOTE — DISCHARGE INSTRUCTIONS
Discharge Instruction for Undelivered Patients      You were seen for: Cerclage  We Consulted: MFLUCY Ponce and Dr Hua  You had (Test or Medicine):Cerclage placed          Activity:  Rest the pelvic area. No sex. Do not stimulate breasts or nipples.     Call your provider if you notice:  Swelling in your face or increased swelling in your hands or legs.  Headaches that are not relieved by Tylenol (acetaminophen).  Changes in your vision (blurring: seeing spots or stars.)  Nausea (sick to your stomach) and vomiting (throwing up).   Weight gain of 5 pounds or more per week.  Heartburn that doesn't go away.  Signs of bladder infection: pain when you urinate (use the toilet), need to go more often and more urgently.  The bag of saba (rupture of membranes) breaks, or you notice leaking in your underwear.  Bright red blood in your underwear.  Abdominal (lower belly) or stomach pain.  For first baby: Contractions (tightening) less than 5 minutes apart for one hour or more.  If less than 34 weeks: Contractions (tightening) more than 6 times in one hour.  Increase or change in vaginal discharge (note the color and amount)      Follow-up:  As scheduled in the clinic

## 2022-04-08 NOTE — OP NOTE
Operative Note: Cervical Cerclage         Pre-Op Diagnosis:   1) Single intrauterine pregnancy at 13w2d  2) Cervical insufficiency by history with prior exam indicated cerclage.         Post-Op Diagnosis:   1) Same         Procedure:   1) Damian cervical cerclage, 2 sutures (knots at 12-1 o'clock position)         Surgeons:   Attending: Pricilla Ponce MD  Fellow: GAUTAM Stephenson Fellow  Resident: Jeanie Smith, PGY-3         Anesthesia:   Spinal          Estimated Blood Loss:   25 cc         Findings:   1) Large multiparous cervix with prominent ectropion  2) Cervix closed following the procedure  2) Fetal heart tones confirmed by doptone following the procedure         Specimens:   1) None         Complications:   1) None apparent          History:     Ms. Chanel Vázquez is a 32 year old  at 13w2d by LMP c/w 8w6d US, who presents for scheduled history indicated cerclage. In her previous pregnancy, Chanel was noted to have a shortened cervix, 0.6-0.8 cm, and evidence of funneling at her anatomy examination at 20w0d.  On physical examination, she was noted to be dilated to 1 cm.  She therefore underwent an exam indicated cerclage.  Her cerclage was removed at 36w3d. Cervical assessment at that time was remarkable for SVE of 5/80/-2.  She went on to have a vaginal delivery at 37w0d. After counseling regarding her history, the patient has decided to proceed with history indicated cerclage.         Details of Procedure:   After administration of spinal anesthesia the patient was placed in the dorsal lithotomy position and prepped and draped in the usual fashion.  A weighted speculum was placed into the vagina and right angle retractors were used to visualize the cervix. The vagina and cervix were copiously cleansed with betadine solution.  The anterior lip of the cervix was grasped with a ring forcep.  A circumferential suture of #2 Ethilon was placed in the usual fashion at the cervicovaginal  reflection with knot tied at 12 o'clock position. Cervix was assessed and noted to be fingertip dilated at the level of the cerclage and thus decision was made to proceed with second stitch  A second suture of #2 Ethilon was placed approximately 1cm proximal to the first stitch at the 12-1 o'clock position.  The sutures were securely tied down and digital exam confirmed that the cervix was closed.    The bladder was drained of clear urine. The cervix and vaginal vault were inspected and noted to be free of injury and hemostatic.      The instruments were removed from the vagina. She tolerated her spinal anesthesia well without incident. She was subsequently transferred to the recovery room in satisfactory condition.    Sponge and needle counts were correct at the close of the case x 2.    Selma Hua MD  Maternal Fetal Medicine Fellow  4/8/2022 9:30 AM      Physician Attestation   I was present for the entire procedure between opening and closing.    Pricilla Ponce  Date of Service (when I saw the patient): 04/08/22

## 2022-04-08 NOTE — PLAN OF CARE
Goal Outcome Evaluation:    Plan of Care Reviewed With: patient       Data: Chanel Vázquez transferred to [facility] via car at 1030.   Action: Receiving unit notified of transfer: Yes. Patient and family notified of room change. Report given to Zeynep Martínez RN at 1045. Belongings sent to receiving unit. Accompanied by Registered Nurse. Oriented patient to surroundings. Call light within reach. ID bands double-checked with receiving RN.  Response: Patient tolerated transfer and is stable.

## 2022-04-08 NOTE — H&P
[facility]  OB History and Physical      Chanel Vázquez MRN# [id number]   Age: 32 year old YOB: 1990     CC:  History of cervical insufficiency.    HPI:  Ms. Chanel Vázquez is a 32 year old  at 13w2d by LMP c/w 8w6d US, who presents for scheduled history indicated cerclage.    In her previous pregnancy, Chanel was noted to have a shortened cervix, 0.6-0.8 cm, and evidence of funneling at her anatomy examination at 20w0d.  On physical examination, she was noted to be dilated to 1 cm.  She therefore underwent an exam indicated cerclage.  Her cerclage was removed at 36w3d. Cervical assessment at that time was remarkable for SVE of 5/80/-2.  She went on to have a vaginal delivery at 37w0d.    After consultation this pregnancy, she elected to proceed with history indicated cerclage. She has been feeling well. Denies any cramping/contraction, leakage of fluid, or vaginal bleeding.    Prenatal Labs:   Lab Results   Component Value Date    ABO A 2020    RH Pos 2020    AS Negative 2022    HEPBANG Nonreactive 2022    CHPCRT Negative 2020    GCPCRT Negative 2020    HGB 13.0 2022       GBS Status:   Lab Results   Component Value Date    GBS Negative 2020       OB History  OB History    Para Term  AB Living   2 1 1 0 0 1   SAB IAB Ectopic Multiple Live Births   0 0 0 0 1      # Outcome Date GA Lbr Simone/2nd Weight Sex Delivery Anes PTL Lv   2 Current            1 Term 20 37w0d 06:45 / 00:17 2.87 kg (6 lb 5.2 oz) F Vag-Spont EPI N GLORIA      Name: BHARATI,FEMALE-CHANEL      Apgar1: 9  Apgar5: 9       PMHx:   Past Medical History:   Diagnosis Date     Varicella      PSHx:   Past Surgical History:   Procedure Laterality Date     AS REPAIR CRUCIATE LIGAMENT,KNEE       CERCLAGE CERVICAL N/A 2020    Procedure: CERCLAGE, CERVIX, VAGINAL APPROACH;  Surgeon: Ronny Rosario MD;  Location:  L+D     [facility]  "SURGERY       Meds:   Medications Prior to Admission   Medication Sig Dispense Refill Last Dose     doxylamine (UNISOM) 25 MG TABS tablet Take 25 mg by mouth nightly as needed   2022 at Unknown time     Prenatal Vit-Fe Fumarate-FA (PRENATAL MULTIVITAMIN W/IRON) 27-0.8 MG tablet Take 1 tablet by mouth daily   2022 at Unknown time     Allergies:  No Known Allergies     FmHx:   Family History   Problem Relation Age of Onset     Depression Mother      Anxiety Disorder Mother      Asthma Father      Rheumatoid Arthritis Father      Depression Brother      Anxiety Disorder Brother      Hyperlipidemia Brother      Diabetes Maternal Grandmother      Diabetes Maternal Grandfather      Heart Disease Maternal Grandfather      Diabetes Paternal Grandmother      Rheumatoid Arthritis Paternal Grandfather      Cancer Other         Paternal Grandfather     Glaucoma No family hx of      Macular Degeneration No family hx of      SocHx: She denies any tobacco, alcohol, or other drug use during this pregnancy.    ROS:   Complete 10-point ROS negative except as noted in HPI    Physical Exam:  Vitals:   Patient Vitals for the past 4 hrs:   BP Pulse Resp Height Weight   22 0711 -- -- -- 1.626 m (5' 4\") 79.8 kg (176 lb)   22 0635 121/70 68 18 -- --      Gen: Well-appearing, NAD, comfortable   CV: RRR  Pulm: Ctab, no wheezes or crackles  Abd: Soft, gravid, non-tender  Ext: No LE edema b/l    Doptones: 160s    Assessment/Plan  Ms. Chanel Vázquez is a 32 year old  at 13w2d by LMP c/w 8w6d US, who presents for scheduled history indicated cerclage.  Her pregnancy is otherwise uncomplicated     History of cervical insufficiency  - T&S and CBC ordered.  - Discussed risks of a cerclage including but not limited to: bleeding (including placental), infection (including chorioamnionitis), damage to surrounding structures including but not limited to bowel, bladder, cervix, risk of PPROM, failure of cerclage to prevent " pre-viable, elizabeth-viable or  birth, possible increased need for  delivery.  - Doptones before and after the procedure  - Surgical consent signed. NPO at this time for possible cerclage this evening  - Anesthesia alerted. No plans for antibiotics unless membranes visualized.    The patient was discussed with Dr. Ponce who is in agreement with the treatment plan.    Selma Hua MD  Maternal Fetal Medicine Fellow  2022 7:56 AM      Physician Attestation   I personally was present and evaluated this patient at the time of her admission.   I agree with plan for history indicated cerclage.     Pricilla Ponce MD

## 2022-04-19 ENCOUNTER — PRENATAL OFFICE VISIT (OUTPATIENT)
Dept: OBGYN | Facility: CLINIC | Age: 32
End: 2022-04-19
Payer: COMMERCIAL

## 2022-04-19 VITALS
HEART RATE: 90 BPM | WEIGHT: 182.2 LBS | BODY MASS INDEX: 30.35 KG/M2 | SYSTOLIC BLOOD PRESSURE: 123 MMHG | HEIGHT: 65 IN | DIASTOLIC BLOOD PRESSURE: 79 MMHG

## 2022-04-19 DIAGNOSIS — Z34.81 ENCOUNTER FOR SUPERVISION OF OTHER NORMAL PREGNANCY IN FIRST TRIMESTER: Primary | ICD-10-CM

## 2022-04-19 DIAGNOSIS — O34.32 CERVICAL CERCLAGE SUTURE PRESENT IN SECOND TRIMESTER: ICD-10-CM

## 2022-04-19 PROCEDURE — 99207 PR PRENATAL VISIT: CPT | Performed by: OBSTETRICS & GYNECOLOGY

## 2022-04-19 NOTE — PROGRESS NOTES
14w6d  S/p history indicated double Damian cerclage on 4/8/22.  Doing well since then.  Had a little bit of pelvic pressure initially, but completed resolved at this point.  Denies any particular questions or concerns.  Level II scheduled for 5/12/22.  Dicussed msAFP.  This could be done at future visit if she desires.  RTC 4w.  Brenda Greer MD

## 2022-05-11 NOTE — PROGRESS NOTES
18w1d  Doing well lately.  No cramping or spotting.  MFM US following this visit.  RTC 4w.  Brenda Greer MD

## 2022-05-12 ENCOUNTER — HOSPITAL ENCOUNTER (OUTPATIENT)
Dept: ULTRASOUND IMAGING | Facility: CLINIC | Age: 32
Discharge: HOME | End: 2022-05-12
Attending: OBSTETRICS & GYNECOLOGY
Payer: COMMERCIAL

## 2022-05-12 ENCOUNTER — PRENATAL OFFICE VISIT (OUTPATIENT)
Dept: OBGYN | Facility: CLINIC | Age: 32
End: 2022-05-12
Payer: COMMERCIAL

## 2022-05-12 ENCOUNTER — OFFICE VISIT (OUTPATIENT)
Dept: MATERNAL FETAL MEDICINE | Facility: CLINIC | Age: 32
End: 2022-05-12
Attending: OBSTETRICS & GYNECOLOGY
Payer: COMMERCIAL

## 2022-05-12 VITALS
OXYGEN SATURATION: 95 % | WEIGHT: 186 LBS | DIASTOLIC BLOOD PRESSURE: 84 MMHG | BODY MASS INDEX: 30.95 KG/M2 | SYSTOLIC BLOOD PRESSURE: 123 MMHG | HEART RATE: 112 BPM

## 2022-05-12 DIAGNOSIS — O09.92 HIGH-RISK PREGNANCY IN SECOND TRIMESTER: Primary | ICD-10-CM

## 2022-05-12 DIAGNOSIS — Z36.89 ENCOUNTER FOR FETAL ANATOMIC SURVEY: Primary | ICD-10-CM

## 2022-05-12 DIAGNOSIS — Z36.2 ENCOUNTER FOR FOLLOW-UP ULTRASOUND OF FETAL ANATOMY: ICD-10-CM

## 2022-05-12 DIAGNOSIS — O34.32 CERVICAL CERCLAGE SUTURE PRESENT IN SECOND TRIMESTER: ICD-10-CM

## 2022-05-12 DIAGNOSIS — Z98.890 HISTORY OF CERVICAL CERCLAGE: ICD-10-CM

## 2022-05-12 PROCEDURE — 99207 PR PRENATAL VISIT: CPT | Performed by: OBSTETRICS & GYNECOLOGY

## 2022-05-12 PROCEDURE — 76811 OB US DETAILED SNGL FETUS: CPT | Mod: 26 | Performed by: OBSTETRICS & GYNECOLOGY

## 2022-05-12 PROCEDURE — 76811 OB US DETAILED SNGL FETUS: CPT

## 2022-05-12 NOTE — PROGRESS NOTES
"Please see \"Imaging\" tab under \"Chart Review\" for details of today's US.    Altagracia Murphy, DO  "

## 2022-06-02 ENCOUNTER — HOSPITAL ENCOUNTER (OUTPATIENT)
Dept: ULTRASOUND IMAGING | Facility: CLINIC | Age: 32
Discharge: HOME | End: 2022-06-02
Attending: OBSTETRICS & GYNECOLOGY
Payer: COMMERCIAL

## 2022-06-02 ENCOUNTER — OFFICE VISIT (OUTPATIENT)
Dept: MATERNAL FETAL MEDICINE | Facility: CLINIC | Age: 32
End: 2022-06-02
Attending: OBSTETRICS & GYNECOLOGY
Payer: COMMERCIAL

## 2022-06-02 DIAGNOSIS — Z36.2 ENCOUNTER FOR FOLLOW-UP ULTRASOUND OF FETAL ANATOMY: ICD-10-CM

## 2022-06-02 DIAGNOSIS — Z36.2 ENCOUNTER FOR FOLLOW-UP ULTRASOUND OF FETAL ANATOMY: Primary | ICD-10-CM

## 2022-06-02 PROCEDURE — 76816 OB US FOLLOW-UP PER FETUS: CPT | Mod: 26 | Performed by: OBSTETRICS & GYNECOLOGY

## 2022-06-02 PROCEDURE — 76816 OB US FOLLOW-UP PER FETUS: CPT

## 2022-06-13 NOTE — PROGRESS NOTES
22w6d  Having some congestion.  Had 4 negative rapid tests.  Awaiting result of PCR test done at [facility].  Getting better slowly.  May be allergy related.  Discussed can take regular Mucinex if needed.  No contractions, vaginal bleeding or leakage of fluid.  Normal FM.  Labs drawn today as ordered by primary provider.  RTC 4w with GCT.  Brenda Greer MD

## 2022-06-14 ENCOUNTER — PRENATAL OFFICE VISIT (OUTPATIENT)
Dept: OBGYN | Facility: CLINIC | Age: 32
End: 2022-06-14
Payer: COMMERCIAL

## 2022-06-14 VITALS
OXYGEN SATURATION: 96 % | DIASTOLIC BLOOD PRESSURE: 86 MMHG | BODY MASS INDEX: 31.45 KG/M2 | SYSTOLIC BLOOD PRESSURE: 130 MMHG | WEIGHT: 189 LBS | HEART RATE: 107 BPM

## 2022-06-14 DIAGNOSIS — Z11.1 SCREENING EXAMINATION FOR PULMONARY TUBERCULOSIS: ICD-10-CM

## 2022-06-14 DIAGNOSIS — Z01.84 IMMUNITY STATUS TESTING: ICD-10-CM

## 2022-06-14 DIAGNOSIS — O34.32 CERVICAL CERCLAGE SUTURE PRESENT IN SECOND TRIMESTER: ICD-10-CM

## 2022-06-14 DIAGNOSIS — O09.92 HIGH-RISK PREGNANCY IN SECOND TRIMESTER: Primary | ICD-10-CM

## 2022-06-14 PROCEDURE — 36415 COLL VENOUS BLD VENIPUNCTURE: CPT | Performed by: OBSTETRICS & GYNECOLOGY

## 2022-06-14 PROCEDURE — 86787 VARICELLA-ZOSTER ANTIBODY: CPT | Performed by: OBSTETRICS & GYNECOLOGY

## 2022-06-14 PROCEDURE — 86481 TB AG RESPONSE T-CELL SUSP: CPT | Performed by: OBSTETRICS & GYNECOLOGY

## 2022-06-14 PROCEDURE — 86735 MUMPS ANTIBODY: CPT | Performed by: OBSTETRICS & GYNECOLOGY

## 2022-06-14 PROCEDURE — 99207 PR PRENATAL VISIT: CPT | Performed by: OBSTETRICS & GYNECOLOGY

## 2022-06-15 LAB
MUMPS ANTIBODY IGG INSTRUMENT VALUE: <5 AU/ML
MUV IGG SER QL IA: NORMAL
VZV IGG SER QL IA: 1784 INDEX
VZV IGG SER QL IA: POSITIVE

## 2022-06-16 LAB
GAMMA INTERFERON BACKGROUND BLD IA-ACNC: 0 IU/ML
M TB IFN-G BLD-IMP: NEGATIVE
M TB IFN-G CD4+ BCKGRND COR BLD-ACNC: 7.68 IU/ML
MITOGEN IGNF BCKGRD COR BLD-ACNC: 0.17 IU/ML
MITOGEN IGNF BCKGRD COR BLD-ACNC: 0.27 IU/ML
QUANTIFERON MITOGEN: 7.68 IU/ML
QUANTIFERON NIL TUBE: 0 IU/ML
QUANTIFERON TB1 TUBE: 0.17 IU/ML
QUANTIFERON TB2 TUBE: 0.27

## 2022-06-20 ENCOUNTER — OFFICE VISIT (OUTPATIENT)
Dept: URGENT CARE | Facility: URGENT CARE | Age: 32
End: 2022-06-20
Payer: COMMERCIAL

## 2022-06-20 VITALS
DIASTOLIC BLOOD PRESSURE: 74 MMHG | WEIGHT: 189 LBS | TEMPERATURE: 98.4 F | SYSTOLIC BLOOD PRESSURE: 112 MMHG | BODY MASS INDEX: 31.45 KG/M2 | OXYGEN SATURATION: 97 % | HEART RATE: 79 BPM

## 2022-06-20 DIAGNOSIS — J01.10 ACUTE NON-RECURRENT FRONTAL SINUSITIS: Primary | ICD-10-CM

## 2022-06-20 DIAGNOSIS — Z33.1 PREGNANT STATE, INCIDENTAL: ICD-10-CM

## 2022-06-20 PROCEDURE — 99203 OFFICE O/P NEW LOW 30 MIN: CPT | Performed by: PHYSICIAN ASSISTANT

## 2022-06-20 ASSESSMENT — ENCOUNTER SYMPTOMS
SINUS PAIN: 1
FEVER: 0
COUGH: 1

## 2022-06-20 NOTE — PROGRESS NOTES
Patient presents with:  Urgent Care  Ear Problem: C/O ear infection for 1 day and congestion      Clinical Decision Making: OM noted on exam. Patient started on Augmentin for tx of OM and sinusitis.       ICD-10-CM    1. Acute non-recurrent frontal sinusitis  J01.10 amoxicillin-clavulanate (AUGMENTIN) 875-125 MG tablet   2. Pregnant state, incidental  Z33.1        Patient Instructions   1.  Take antibiotic according to bottle instructions with food to avoid stomach upset.  If you are prone to stomach upset with antibiotics, I recommend adding a probiotic to this regimen.  Culturelle is a trusted brand.  2.  I recommend using Mucinex to help thin mucus secretions.  Adding a saline nasal spray can also be helpful with clearing sinus congestion.  3.  Take Tylenol as needed for pain or fever control.  4.  Follow-up if you not having any improvement in your symptoms over the next 5 days.        HPI:  Chanel Vázquez is a 32 year old female who presents today complaining of nasal congestion and left ear pain x 1 day. She has been getting over a cold x 2 weeks. Fawad has taken 4 COVID test since becoming ill.     History obtained from the patient.    Problem List:  2022: History of  delivery  2022: Need for Tdap vaccination  2021: History of cervical cerclage  2020: Pregnancy  2020: Encounter for triage in pregnant patient  05:  labor  2020: Cervical cerclage suture present  2020: Suspected shortening of cervix not found  2020: Short cervical length during pregnancy  2019: Encounter for supervision of other normal pregnancy, first   trimester  2019-10: Need for Tdap vaccination      Past Medical History:   Diagnosis Date     Varicella        Social History     Tobacco Use     Smoking status: Former Smoker     Packs/day: 0.00     Types: Cigarettes     Smokeless tobacco: Never Used   Substance Use Topics     Alcohol use: Not Currently     Comment: Occasional       Review of  Systems   Constitutional: Negative for fever.   HENT: Positive for congestion, ear pain and sinus pain.    Respiratory: Positive for cough.        Vitals:    06/20/22 1229   BP: 112/74   Pulse: 79   Temp: 98.4  F (36.9  C)   TempSrc: Tympanic   SpO2: 97%   Weight: 85.7 kg (189 lb)       Physical Exam  Vitals and nursing note reviewed.   Constitutional:       General: She is not in acute distress.     Appearance: She is not toxic-appearing or diaphoretic.   HENT:      Head: Normocephalic and atraumatic.      Right Ear: Tympanic membrane, ear canal and external ear normal. There is impacted cerumen.      Left Ear: Ear canal and external ear normal. There is impacted cerumen. Tympanic membrane is erythematous.   Eyes:      Conjunctiva/sclera: Conjunctivae normal.   Pulmonary:      Effort: Pulmonary effort is normal. No respiratory distress.   Neurological:      Mental Status: She is alert.   Psychiatric:         Mood and Affect: Mood normal.         Behavior: Behavior normal.         Thought Content: Thought content normal.         Judgment: Judgment normal.       At the end of the encounter, I discussed results, diagnosis, medications. Discussed red flags for immediate return to clinic/ER, as well as indications for follow up if no improvement. Patient understood and agreed to plan. Patient was stable for discharge.

## 2022-06-20 NOTE — PATIENT INSTRUCTIONS
1.  Take antibiotic according to bottle instructions with food to avoid stomach upset.  If you are prone to stomach upset with antibiotics, I recommend adding a probiotic to this regimen.  Culturelle is a trusted brand.  2.  I recommend using Mucinex to help thin mucus secretions.  Adding a saline nasal spray can also be helpful with clearing sinus congestion.  3.  Take Tylenol as needed for pain or fever control.  4.  Follow-up if you not having any improvement in your symptoms over the next 5 days.

## 2022-06-20 NOTE — NURSING NOTE
Ear wash performed on both ears. Patient was able to tolerate ear wash on right ear. Patient was unable to tolerate ear wash on left ear. Stopped ear and notified provider.    Zeynep Reyes LPN

## 2022-06-30 ENCOUNTER — MYC MEDICAL ADVICE (OUTPATIENT)
Dept: OBGYN | Facility: CLINIC | Age: 32
End: 2022-06-30

## 2022-06-30 ENCOUNTER — LAB (OUTPATIENT)
Dept: LAB | Facility: CLINIC | Age: 32
End: 2022-06-30
Payer: COMMERCIAL

## 2022-06-30 DIAGNOSIS — B37.31 CANDIDIASIS OF VAGINA: ICD-10-CM

## 2022-06-30 DIAGNOSIS — N76.0 BACTERIAL VAGINOSIS: ICD-10-CM

## 2022-06-30 DIAGNOSIS — B96.89 BACTERIAL VAGINOSIS: ICD-10-CM

## 2022-06-30 DIAGNOSIS — N76.0 VAGINITIS AND VULVOVAGINITIS: ICD-10-CM

## 2022-06-30 LAB
CLUE CELLS: PRESENT
TRICHOMONAS, WET PREP: ABNORMAL
WBC'S/HIGH POWER FIELD, WET PREP: ABNORMAL
YEAST, WET PREP: PRESENT

## 2022-06-30 PROCEDURE — 87210 SMEAR WET MOUNT SALINE/INK: CPT

## 2022-06-30 PROCEDURE — 87491 CHLMYD TRACH DNA AMP PROBE: CPT

## 2022-06-30 PROCEDURE — 87591 N.GONORRHOEAE DNA AMP PROB: CPT

## 2022-06-30 RX ORDER — METRONIDAZOLE 500 MG/1
500 TABLET ORAL 2 TIMES DAILY
Qty: 14 TABLET | Refills: 0 | Status: SHIPPED | OUTPATIENT
Start: 2022-06-30 | End: 2022-08-10

## 2022-07-01 LAB
C TRACH DNA SPEC QL NAA+PROBE: NEGATIVE
N GONORRHOEA DNA SPEC QL NAA+PROBE: NEGATIVE

## 2022-07-12 ENCOUNTER — PRENATAL OFFICE VISIT (OUTPATIENT)
Dept: OBGYN | Facility: CLINIC | Age: 32
End: 2022-07-12
Payer: COMMERCIAL

## 2022-07-12 VITALS
HEART RATE: 96 BPM | BODY MASS INDEX: 32.12 KG/M2 | OXYGEN SATURATION: 98 % | DIASTOLIC BLOOD PRESSURE: 84 MMHG | WEIGHT: 193 LBS | SYSTOLIC BLOOD PRESSURE: 119 MMHG

## 2022-07-12 DIAGNOSIS — O34.32 CERVICAL CERCLAGE SUTURE PRESENT IN SECOND TRIMESTER: ICD-10-CM

## 2022-07-12 DIAGNOSIS — O09.92 HIGH-RISK PREGNANCY IN SECOND TRIMESTER: Primary | ICD-10-CM

## 2022-07-12 LAB
GLUCOSE 1H P 50 G GLC PO SERPL-MCNC: 145 MG/DL (ref 70–129)
HGB BLD-MCNC: 13.1 G/DL (ref 11.7–15.7)

## 2022-07-12 PROCEDURE — 36415 COLL VENOUS BLD VENIPUNCTURE: CPT | Performed by: OBSTETRICS & GYNECOLOGY

## 2022-07-12 PROCEDURE — 99207 PR PRENATAL VISIT: CPT | Performed by: OBSTETRICS & GYNECOLOGY

## 2022-07-12 PROCEDURE — 82950 GLUCOSE TEST: CPT | Performed by: OBSTETRICS & GYNECOLOGY

## 2022-07-12 NOTE — PROGRESS NOTES
26w6d  Doing well overall.  No s/sx of  labor.  +FM  Planning to start school in .  GCT today.  Did have to take 3 hour last pregnancy, but that was normal.  RTC 3w, then q2w  Brenda Greer MD

## 2022-07-14 DIAGNOSIS — O09.92 HIGH-RISK PREGNANCY IN SECOND TRIMESTER: Primary | ICD-10-CM

## 2022-07-15 ENCOUNTER — MYC MEDICAL ADVICE (OUTPATIENT)
Dept: OBGYN | Facility: CLINIC | Age: 32
End: 2022-07-15

## 2022-07-15 DIAGNOSIS — O09.92 HIGH-RISK PREGNANCY IN SECOND TRIMESTER: Primary | ICD-10-CM

## 2022-07-22 ENCOUNTER — LAB (OUTPATIENT)
Dept: LAB | Facility: CLINIC | Age: 32
End: 2022-07-22
Payer: COMMERCIAL

## 2022-07-22 DIAGNOSIS — O09.92 HIGH-RISK PREGNANCY IN SECOND TRIMESTER: ICD-10-CM

## 2022-07-22 LAB
GESTATIONAL GTT 1 HR POST DOSE: 168 MG/DL (ref 60–179)
GESTATIONAL GTT 2 HR POST DOSE: 180 MG/DL (ref 60–154)
GESTATIONAL GTT 3 HR POST DOSE: 138 MG/DL (ref 60–139)
GLUCOSE P FAST SERPL-MCNC: 81 MG/DL (ref 60–94)
HOLD SPECIMEN: NORMAL

## 2022-07-22 PROCEDURE — 36415 COLL VENOUS BLD VENIPUNCTURE: CPT

## 2022-07-22 PROCEDURE — 82951 GLUCOSE TOLERANCE TEST (GTT): CPT

## 2022-07-22 PROCEDURE — 82952 GTT-ADDED SAMPLES: CPT

## 2022-07-29 ENCOUNTER — TELEPHONE (OUTPATIENT)
Dept: OBGYN | Facility: CLINIC | Age: 32
End: 2022-07-29

## 2022-07-29 NOTE — TELEPHONE ENCOUNTER
Patient called FD Friday 07/22/22 to discuss scheduling cerclage removal with SK. No notes found for when this should be scheduled, no case request (assuming it would be done in OR).  relayed info to me, I was unable to take call at that moment so asked FD to send TE to triage/SK to inform and ask when it needed to be scheduled and for a case request if applicable. Unable to find TE from , may not have sent it.     Patient calling  again today to discuss scheduling, since TE was not sent no further progress has been made about obtaining info for when it needs to be scheduled and to ask for case request.     Routing to on-call KATELYNN: if this cerclage removal should be done in OR, are you able to submit case request on SK's behalf so I can start scheduling process? Also, do you know when this should be scheduled?     Will also route to SK for awareness.      Ashley Bonner/her/hers  [facility] Scheduler

## 2022-07-29 NOTE — TELEPHONE ENCOUNTER
This is not done until 36-37 wks, so no urgency in scheduling.  Zoey's last visit note does not mention cerclage removal, so I am going to defer this to her on Monday when she is working since I am not sure if she is planning to do, send to [facility], do in OR, etc.       Commonly, we just schedule them in triage on L&D and not the OR, but since we have plenty of time, I'll let her tell you what she prefers.  Thanks    CHAGO ROGER MD

## 2022-08-01 NOTE — TELEPHONE ENCOUNTER
Routing to RD MALCOLM for scheduling: please schedule est prenatal appt ~36wks (09/14/22 - 09/20/22) with SK for in office cerclage removal.       Ashley Bonner/her/hers  [facility] Scheduler

## 2022-08-01 NOTE — TELEPHONE ENCOUNTER
Plan for attempt at removal during office visit ~36w.  If unsuccessful, would then plan for removal in triage with IV sedation.  No case request necessary.    Brenda Greer MD

## 2022-08-10 ENCOUNTER — PRENATAL OFFICE VISIT (OUTPATIENT)
Dept: OBGYN | Facility: CLINIC | Age: 32
End: 2022-08-10
Payer: COMMERCIAL

## 2022-08-10 VITALS
BODY MASS INDEX: 32.89 KG/M2 | WEIGHT: 197.4 LBS | DIASTOLIC BLOOD PRESSURE: 81 MMHG | HEART RATE: 103 BPM | SYSTOLIC BLOOD PRESSURE: 115 MMHG | HEIGHT: 65 IN | OXYGEN SATURATION: 97 %

## 2022-08-10 DIAGNOSIS — O09.92 HIGH-RISK PREGNANCY IN SECOND TRIMESTER: Primary | ICD-10-CM

## 2022-08-10 DIAGNOSIS — Z23 NEED FOR TDAP VACCINATION: ICD-10-CM

## 2022-08-10 PROCEDURE — 90471 IMMUNIZATION ADMIN: CPT | Performed by: OBSTETRICS & GYNECOLOGY

## 2022-08-10 PROCEDURE — 99207 PR PRENATAL VISIT: CPT | Performed by: OBSTETRICS & GYNECOLOGY

## 2022-08-10 PROCEDURE — 90715 TDAP VACCINE 7 YRS/> IM: CPT | Performed by: OBSTETRICS & GYNECOLOGY

## 2022-08-10 NOTE — PROGRESS NOTES
31w0d   No complaints.  Baby is moving well.  We reviewed results of GTT 3/4 normal.  discussed might be some glc intolerance.  Reviewed weight gain. Suggested limiting carbs and sugar in diet.   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  Feeling more pressure as pregnancy progresses, but no strong contractions.   Has cerclage removal scheduled.  Tdap done today.  RR

## 2022-08-10 NOTE — Clinical Note
Can someone please review the [employer] message from 7/14-7/15 about the referral to preferred one for the breast pump from [employer]?  The conversation just dropped off and patient ishere asking about it today.  thanks

## 2022-08-17 ENCOUNTER — TELEPHONE (OUTPATIENT)
Dept: OBGYN | Facility: CLINIC | Age: 32
End: 2022-08-17

## 2022-08-17 NOTE — TELEPHONE ENCOUNTER
Chanel Vázquez  @32w0d  confirmed new positive COVID-19 diagnosis PCR or Antigen test        Concern for COVID-19  Exactly how many days ago did these symptoms start? 2 days, 8/15  (If asymptomatic schedule phone visit within 3 days, mild symptoms for 1-4 days schedule same day phone visit with on call provider)     Are any of the following symptoms significant for you?    New or worsening difficulty breathing? No    Worsening cough? Yes, I am coughing up mucus.    Fever or chills? No    Headache: YES    Sore throat: No    Chest pain: No    Diarrhea: No    Body aches? YES    What treatments has patient tried? Acetaminophen   Does patient live in a nursing home, group home, or shelter? No  Does patient have a way to get food/medications during quarantined? Yes, I have a friend or family member who can help me.      Temporary change to home medications: None   None     {Refresh the note after risk score flowsheet is filled out Link to [facility] Flowsheet :[id number]      If patient is not pregnant refer patient to their family practice provider  Only refer to [facility] if patient is discharged from Inpatient care or ED.

## 2022-08-18 ENCOUNTER — VIRTUAL VISIT (OUTPATIENT)
Dept: OBGYN | Facility: CLINIC | Age: 32
End: 2022-08-18
Payer: COMMERCIAL

## 2022-08-18 DIAGNOSIS — O98.513 COVID-19 AFFECTING PREGNANCY IN THIRD TRIMESTER: ICD-10-CM

## 2022-08-18 DIAGNOSIS — U07.1 COVID-19 AFFECTING PREGNANCY IN THIRD TRIMESTER: ICD-10-CM

## 2022-08-18 DIAGNOSIS — O09.92 HIGH-RISK PREGNANCY IN SECOND TRIMESTER: Primary | ICD-10-CM

## 2022-08-18 PROCEDURE — 99213 OFFICE O/P EST LOW 20 MIN: CPT | Mod: TEL | Performed by: OBSTETRICS & GYNECOLOGY

## 2022-08-18 NOTE — PROGRESS NOTES
"  Assessment & Plan     High-risk pregnancy in third trimester  Keep next appointment, wear a mask.     COVID-19 affecting pregnancy in third trimester  Reviewed precautions  Delsym for symptoms   Notify us with any worsening.   Discussed paxlovid, would like to take.   - nirmatrelvir and ritonavir (PAXLOVID) therapy pack; Take 3 tablets by mouth 2 times daily for 5 days (Take 2 Nirmatrelvir tablets and 1 Ritonavir tablet twice daily for 5 days)    Prescription drug management  15 minutes spent on the date of the encounter doing chart review, history and exam, documentation and further activities per the note       BMI:   Estimated body mass index is 32.85 kg/m  as calculated from the following:    Height as of 8/10/22: 1.651 m (5' 5\").    Weight as of 8/10/22: 89.5 kg (197 lb 6.4 oz).           No follow-ups on file.    Halley Wong MD  [facility]    Huber Buchanan is a 32 year old, presenting for the following health issues:  Prenatal Care (Covid + 08/17/22)      HPI   Telephone visit for COVID.     Concern for COVID-19  Exactly how many days ago did these symptoms start? 2 days, 8/15  (If asymptomatic schedule phone visit within 3 days, mild symptoms for 1-4 days schedule same day phone visit with on call provider)     Are any of the following symptoms significant for you?    New or worsening difficulty breathing? No    Worsening cough? Yes, I am coughing up mucus. Really thick. Does not feel pelvic pressure with coughing.     Fever or chills? No    Headache: YES    Sore throat: No    Chest pain: No    Diarrhea: No    Body aches? YES     What treatments has patient tried? Acetaminophen   Does patient live in a nursing home, group home, or shelter? No  Does patient have a way to get food/medications during quarantined? Yes, I have a friend or family member who can help me.        Temporary change to home medications: None   None      {Refresh the note after risk score " flowsheet is filled out Link to [facility] Flowsheet :[id number]        If patient is not pregnant refer patient to their family practice provider  Only refer to [facility] if patient is discharged from Inpatient care or ED.                  Review of Systems   Constitutional, HEENT, cardiovascular, pulmonary, gi and gu systems are negative, except as otherwise noted.      Objective    LMP 01/05/2022   There is no height or weight on file to calculate BMI.  Physical Exam   GENERAL: healthy, alert and stuffy  RESP: no increased work of breathing   PSYCH: mentation appears normal, affect normal/bright    Home positive COVID test 8/16.     Telephone time: 11 minutes            .  ..

## 2022-08-20 ENCOUNTER — HEALTH MAINTENANCE LETTER (OUTPATIENT)
Age: 32
End: 2022-08-20

## 2022-08-22 NOTE — PROGRESS NOTES
32w6d  Diagnosed with COVID on 8/17/22, symptoms started 8/15.  Finished Paxlovid today.  Never had fever or abn O2 sat.  Often felt uncomfortable, SOB but no true issues breathing.  Slept fine without issues.  Discussed risk of rebound infection, so she will monitor if symptoms return.  RTC 2w.  Brenda Greer MD

## 2022-08-23 ENCOUNTER — PRENATAL OFFICE VISIT (OUTPATIENT)
Dept: OBGYN | Facility: CLINIC | Age: 32
End: 2022-08-23
Payer: COMMERCIAL

## 2022-08-23 VITALS
DIASTOLIC BLOOD PRESSURE: 83 MMHG | WEIGHT: 196 LBS | HEART RATE: 94 BPM | SYSTOLIC BLOOD PRESSURE: 117 MMHG | OXYGEN SATURATION: 99 % | BODY MASS INDEX: 32.62 KG/M2

## 2022-08-23 DIAGNOSIS — U07.1 COVID-19 AFFECTING PREGNANCY IN THIRD TRIMESTER: ICD-10-CM

## 2022-08-23 DIAGNOSIS — O98.513 COVID-19 AFFECTING PREGNANCY IN THIRD TRIMESTER: ICD-10-CM

## 2022-08-23 DIAGNOSIS — O09.92 HIGH-RISK PREGNANCY IN SECOND TRIMESTER: Primary | ICD-10-CM

## 2022-08-23 PROCEDURE — 99207 PR PRENATAL VISIT: CPT | Performed by: OBSTETRICS & GYNECOLOGY

## 2022-09-09 ENCOUNTER — PRENATAL OFFICE VISIT (OUTPATIENT)
Dept: OBGYN | Facility: CLINIC | Age: 32
End: 2022-09-09
Payer: COMMERCIAL

## 2022-09-09 ENCOUNTER — ANCILLARY PROCEDURE (OUTPATIENT)
Dept: ULTRASOUND IMAGING | Facility: CLINIC | Age: 32
End: 2022-09-09
Attending: OBSTETRICS & GYNECOLOGY
Payer: COMMERCIAL

## 2022-09-09 VITALS
WEIGHT: 201 LBS | DIASTOLIC BLOOD PRESSURE: 83 MMHG | SYSTOLIC BLOOD PRESSURE: 119 MMHG | OXYGEN SATURATION: 97 % | HEART RATE: 99 BPM | BODY MASS INDEX: 33.45 KG/M2

## 2022-09-09 DIAGNOSIS — U07.1 COVID-19 AFFECTING PREGNANCY IN THIRD TRIMESTER: ICD-10-CM

## 2022-09-09 DIAGNOSIS — O09.92 HIGH-RISK PREGNANCY IN SECOND TRIMESTER: ICD-10-CM

## 2022-09-09 DIAGNOSIS — O98.513 COVID-19 AFFECTING PREGNANCY IN THIRD TRIMESTER: ICD-10-CM

## 2022-09-09 DIAGNOSIS — O09.92 HIGH-RISK PREGNANCY IN SECOND TRIMESTER: Primary | ICD-10-CM

## 2022-09-09 DIAGNOSIS — O34.32 CERVICAL CERCLAGE SUTURE PRESENT IN SECOND TRIMESTER: ICD-10-CM

## 2022-09-09 PROCEDURE — 99207 PR PRENATAL VISIT: CPT | Performed by: OBSTETRICS & GYNECOLOGY

## 2022-09-09 PROCEDURE — 76816 OB US FOLLOW-UP PER FETUS: CPT | Performed by: OBSTETRICS & GYNECOLOGY

## 2022-09-09 NOTE — PROGRESS NOTES
35w2d  Doing well.  No s/sx of labor.  +FM  Growth today 48%ile.  Normal fluid.  RTC 10d for office visit and cerclage removal.  Brenda Greer MD

## 2022-09-17 NOTE — PROGRESS NOTES
36w5d  Patient presents for routine OB visit and cerclage removal.  No complaints at this time.    Verbal consent obtained for cerclage removal.  Sterile Graves speculum inserted in the vagina with good visualization of the cervix.  Tails of knots of cerclage well visualized at 1:00.  Tail grasped to elevate the knot.  Visualization under the knot difficult.  Attempt made to cut one side of the cerclage, but unfortunately cut both and only knot removed. Could not visualize remainder of suture for removal.  Similar attempts made to visualize under the knot of the second cerclage were unsuccessful.  Due to difficult visualization and patient discomfort, the procedure was terminated with plan for patient to present to triage tomorrow for removal with IV fentanyl.  Patient agreeable with this plan.  Cervix remained closed with one cerclage in place at end of procedure.  Minimal bleeding.  RTC weekly until delivery.  Brenda Greer MD

## 2022-09-19 ENCOUNTER — PRENATAL OFFICE VISIT (OUTPATIENT)
Dept: OBGYN | Facility: CLINIC | Age: 32
End: 2022-09-19
Payer: COMMERCIAL

## 2022-09-19 VITALS
TEMPERATURE: 98.1 F | SYSTOLIC BLOOD PRESSURE: 121 MMHG | DIASTOLIC BLOOD PRESSURE: 84 MMHG | WEIGHT: 202 LBS | HEART RATE: 93 BPM | BODY MASS INDEX: 33.61 KG/M2

## 2022-09-19 DIAGNOSIS — O34.32 CERVICAL CERCLAGE SUTURE PRESENT IN SECOND TRIMESTER: ICD-10-CM

## 2022-09-19 DIAGNOSIS — O09.92 HIGH-RISK PREGNANCY IN SECOND TRIMESTER: Primary | ICD-10-CM

## 2022-09-19 PROCEDURE — 99207 PR PRENATAL VISIT: CPT | Performed by: OBSTETRICS & GYNECOLOGY

## 2022-09-19 PROCEDURE — 87653 STREP B DNA AMP PROBE: CPT | Performed by: OBSTETRICS & GYNECOLOGY

## 2022-09-20 ENCOUNTER — HOSPITAL ENCOUNTER (OUTPATIENT)
Facility: CLINIC | Age: 32
Discharge: HOME | End: 2022-09-20
Attending: OBSTETRICS & GYNECOLOGY | Admitting: OBSTETRICS & GYNECOLOGY
Payer: COMMERCIAL

## 2022-09-20 VITALS
OXYGEN SATURATION: 98 % | HEIGHT: 65 IN | WEIGHT: 202 LBS | SYSTOLIC BLOOD PRESSURE: 115 MMHG | DIASTOLIC BLOOD PRESSURE: 76 MMHG | BODY MASS INDEX: 33.66 KG/M2 | TEMPERATURE: 97.9 F

## 2022-09-20 PROBLEM — O34.30 CERVICAL CERCLAGE SUTURE PRESENT: Status: ACTIVE | Noted: 2022-09-20

## 2022-09-20 LAB — GP B STREP DNA SPEC QL NAA+PROBE: NEGATIVE

## 2022-09-20 PROCEDURE — 250N000011 HC RX IP 250 OP 636

## 2022-09-20 PROCEDURE — 99212 OFFICE O/P EST SF 10 MIN: CPT | Mod: 25 | Performed by: OBSTETRICS & GYNECOLOGY

## 2022-09-20 PROCEDURE — 59025 FETAL NON-STRESS TEST: CPT | Mod: 26 | Performed by: OBSTETRICS & GYNECOLOGY

## 2022-09-20 PROCEDURE — 96374 THER/PROPH/DIAG INJ IV PUSH: CPT

## 2022-09-20 PROCEDURE — G0463 HOSPITAL OUTPT CLINIC VISIT: HCPCS | Mod: 25

## 2022-09-20 PROCEDURE — 59899 UNLISTED PX MAT CARE&DLVR: CPT

## 2022-09-20 RX ORDER — FENTANYL CITRATE 50 UG/ML
INJECTION, SOLUTION INTRAMUSCULAR; INTRAVENOUS
Status: COMPLETED
Start: 2022-09-20 | End: 2022-09-20

## 2022-09-20 RX ORDER — FENTANYL CITRATE 50 UG/ML
50-100 INJECTION, SOLUTION INTRAMUSCULAR; INTRAVENOUS ONCE
Status: COMPLETED | OUTPATIENT
Start: 2022-09-20 | End: 2022-09-20

## 2022-09-20 RX ADMIN — FENTANYL CITRATE 100 MCG: 50 INJECTION, SOLUTION INTRAMUSCULAR; INTRAVENOUS at 11:21

## 2022-09-20 ASSESSMENT — ACTIVITIES OF DAILY LIVING (ADL)
ADLS_ACUITY_SCORE: 20
ADLS_ACUITY_SCORE: 20

## 2022-09-20 NOTE — PROGRESS NOTES
Patient report increasing cramping to RN following removal, so monitoring extended.  FHT remained reassuring and NST reactive.  Orangetree had showed increased irritability, but now very spaced out.  Patient reports symptoms back to baseline.  Cervix unchanged, and actually much higher than when checked immediately post-cerclage removal.    Patient comfortable with discharge home.  Will follow-up next week as scheduled.  Knows when to call/come in.    Brenda Greer MD

## 2022-09-20 NOTE — PLAN OF CARE
Pt to BP for cerclage removal with IV fentanyl. Discuss IV start, plan for speculum and other instruments to help with removal. IV placed. Plan to administer fentanyl just prior to procedure to ensure good coverage.

## 2022-09-20 NOTE — DISCHARGE INSTRUCTIONS
Discharge Instruction for Undelivered Patients      You were seen for:  Cerclage removal  We Consulted: Dr Greer  You had (Test or Medicine):Cerclage removal    Call your provider if you notice:  Swelling in your face or increased swelling in your hands or legs.  Headaches that are not relieved by Tylenol (acetaminophen).  Changes in your vision (blurring: seeing spots or stars.)  Nausea (sick to your stomach) and vomiting (throwing up).   Weight gain of 5 pounds or more per week.  Heartburn that doesn't go away.  Signs of bladder infection: pain when you urinate (use the toilet), need to go more often and more urgently.  The bag of saba (rupture of membranes) breaks, or you notice leaking in your underwear.  Bright red blood in your underwear.  Abdominal (lower belly) or stomach pain.  For first baby: Contractions (tightening) less than 5 minutes apart for one hour or more.  Second (plus) baby: Contractions (tightening) less than 10 minutes apart and getting stronger.  *If less than 34 weeks: Contractions (tightening) more than 6 times in one hour.  Increase or change in vaginal discharge (note the color and amount)    Follow-up:  As scheduled in the clinic

## 2022-09-20 NOTE — PROCEDURES
ORIANA CERCLAGE REMOVAL    Date of procedure:  09/20/22     Procedure:  The patient emptied her bladder and was dressed in a hospital gown.  The procedure, and risks were discussed with the patient.  Questions were answered and verbal consent was obtained.      NST was reactive.  Baseline 130, moderate variability, accelerations, no decelerations.  Tocometry was negative for contractions.    An IV was in place.  Fentanyl 50mcg IV was administered.  Sterile retractors were attempted to be placed, but patient quite uncomfortable.  Fentanyl 50mcg IV was given again for pain control.  Sterile Graves speculum was placed.  Cerclage visible.  Ends elevated with ring forceps and one end cut under the knot.  The remainder of the cerclage was removed.  Sterile cervical exam performed.  Cervix 2/40/-2.  Could palpate that suture still present, but not taut or impeding cervical dilation.  Unable to remove this easily.      Patient tolerated procedure well with IV Fentanyl.  Denied change in cramping or contractions following removal.      External monitors placed after cerclage removal.  If patient comfortable, no evidence of labor and reactive NST after 30min, patient can discharge home.  She was scheduled for weekly follow-up for the next two weeks.      Plan routine precautions after discharge.       Rh positive status; Rhogam not indicated.    Brenda Greer MD

## 2022-09-20 NOTE — PLAN OF CARE
Goal Outcome Evaluation:  Cerclage removed, pt feeling regular ctx's mild at palpation, baby reactive. Extended monitoring done; pt states ctx's are spacing out, cervix checked per Dr Greer cervix unchanged. Pt ok going home, discharge ambulating with  at 1320. Discharge instructions given.

## 2022-09-20 NOTE — H&P
[facility]    History and Physical  Obstetrics and Gynecology     Date of Admission:  2022    Assessment & Plan   Chanel Vázquez is a 32 year old female who presents for cerclage removal.  ASSESSMENT:   IUP @ 36w6d   NST reactive.  Category  I    PLAN:   Cerclage removal with IV fentanyl.  Anticipate discharge home after removal.    Brenda Greer MD    History of Present Illness   Chanel Vázquez is a 32 year old female  36w6d  Estimated Date of Delivery: 10/12/22 is calculated from Patient's last menstrual period was 2022. [facility] for [facility]age removal.    Presented to clinic for cerclage removal yesterday.  Inadvertently cut both sides of one stitch, so knot removed but suture unfortunately not.  Unable to get adequate visualization to remove second stitch, so brought in today for removal with additional retractors and pain control.    No change in cramping overnight.  Small amount of spotting, but less than she expected.    PRENATAL COURSE  Prenatal course was complicated by placement of history indicated double Damian cerclage.      Recent Labs   Lab Test 22  0744 22  1410 20  1607   ABO  --   --  A   RH  --   --  Pos   AS Negative   < > Neg    < > = values in this interval not displayed.     Rhogam not indicated   Recent Labs   Lab Test 22  1410 20  1300   HEPBANG Nonreactive  --    HIAGAB Nonreactive  --    GBS  --  Negative   RUQIGG Positive  --        Past Medical History    I have reviewed this patient's medical history and updated it with pertinent information if needed.   Past Medical History:   Diagnosis Date     Varicella        Past Surgical History   I have reviewed this patient's surgical history and updated it with pertinent information if needed.  Past Surgical History:   Procedure Laterality Date     AS REPAIR CRUCIATE LIGAMENT,KNEE  2010     CERCLAGE CERVICAL N/A  1/7/2020    Procedure: CERCLAGE, CERVIX, VAGINAL APPROACH;  Surgeon: Ronny Rosario MD;  Location: [facility]     CERCLAGE CERVICAL N/A 4/8/2022    Procedure: CERCLAGE, CERVIX, VAGINAL APPROACH;  Surgeon: Pricilla Ponce MD;  Location: [facility]     ELBOW SURGERY         Prior to Admission Medications   Prior to Admission Medications   Prescriptions Last Dose Informant Patient Reported? Taking?   Prenatal Vit-Fe Fumarate-FA (PRENATAL MULTIVITAMIN W/IRON) 27-0.8 MG tablet 9/19/2022 at Unknown time  Yes Yes   Sig: Take 1 tablet by mouth daily      Facility-Administered Medications: None     Allergies   No Known Allergies    Social History   I have reviewed this patient's social history and updated it with pertinent information if needed. Chanel Vázquez  reports that she has quit smoking. Her smoking use included cigarettes. She smoked 0.00 packs per day. She has never used smokeless tobacco. She reports previous alcohol use. She reports that she does not use drugs.    Family History   I have reviewed this patient's family history and updated it with pertinent information if needed.   Family History   Problem Relation Age of Onset     Depression Mother      Anxiety Disorder Mother      Asthma Father      Rheumatoid Arthritis Father      Depression Brother      Anxiety Disorder Brother      Hyperlipidemia Brother      Diabetes Maternal Grandmother      Diabetes Maternal Grandfather      Heart Disease Maternal Grandfather      Diabetes Paternal Grandmother      Rheumatoid Arthritis Paternal Grandfather      Cancer Other         Paternal Grandfather     Glaucoma No family hx of      Macular Degeneration No family hx of        Immunization History   Immunization History   Administered Date(s) Administered     COVID-19,PF,Pfizer (12+ Yrs) 12/30/2020, 01/18/2021     Flu, Unspecified 10/03/2019, 10/01/2020, 09/27/2021     HepB [birth year]     Influenza Vaccine IM > 6 months Valent IIV4  (Alfuria,Fluzone) 10/03/2019, 10/10/2020, 09/27/2021     Influenza Vaccine, 6+MO IM (QUADRIVALENT W/PRESERVATIVES) 08/29/2014, 10/16/2015, 09/01/2017, 10/31/2018, 10/03/2019     MMR 08/02/1993, 06/02/1997     TDAP Vaccine (Adacel) 10/23/2012, 04/07/2020     Tdap (Adacel,Boostrix) 08/10/2022     Varicella 07/23/2007       Physical Exam   Temp: 97.9  F (36.6  C) Temp src: Oral BP: 123/82              Vital Signs with Ranges  Temp:  [97.9  F (36.6  C)] 97.9  F (36.6  C)  BP: (123)/(82) 123/82    Abdomen: gravid, single vertex fetus, non-tender  Cervical Exam: deferred on admission.  Will check after cerclage removal.    Fetal Heart Tones: 130 baseline, moderate variablility, + accels, no decels and Category I  TOCO:   quiet    Constitutional: healthy, alert, active and no distress   Respiratory: no increased work of breathing  Skin/Extremites: no bruising or bleeding and normal skin color, texture, turgor  Neurologic: Mental Status Exam:  Level of Alertness:   awake  Cranial Nerves:  cranial nerves II-XII are grossly intact  Neuropsychiatric: appropriate mood and affect

## 2022-09-28 ENCOUNTER — PRENATAL OFFICE VISIT (OUTPATIENT)
Dept: OBGYN | Facility: CLINIC | Age: 32
End: 2022-09-28
Payer: COMMERCIAL

## 2022-09-28 VITALS
SYSTOLIC BLOOD PRESSURE: 120 MMHG | BODY MASS INDEX: 33.95 KG/M2 | WEIGHT: 204 LBS | OXYGEN SATURATION: 97 % | DIASTOLIC BLOOD PRESSURE: 82 MMHG | HEART RATE: 114 BPM

## 2022-09-28 DIAGNOSIS — Z34.83 ENCOUNTER FOR SUPERVISION OF OTHER NORMAL PREGNANCY IN THIRD TRIMESTER: Primary | ICD-10-CM

## 2022-09-28 PROCEDURE — 99207 PR PRENATAL VISIT: CPT | Performed by: OBSTETRICS & GYNECOLOGY

## 2022-10-04 ENCOUNTER — PRENATAL OFFICE VISIT (OUTPATIENT)
Dept: OBGYN | Facility: CLINIC | Age: 32
End: 2022-10-04
Payer: COMMERCIAL

## 2022-10-04 VITALS
WEIGHT: 204 LBS | SYSTOLIC BLOOD PRESSURE: 118 MMHG | DIASTOLIC BLOOD PRESSURE: 89 MMHG | OXYGEN SATURATION: 96 % | HEART RATE: 99 BPM | BODY MASS INDEX: 33.95 KG/M2

## 2022-10-04 DIAGNOSIS — O36.8390 ABNORMAL FETAL HEART RATE AFFECTING PREGNANCY: ICD-10-CM

## 2022-10-04 DIAGNOSIS — Z34.83 ENCOUNTER FOR SUPERVISION OF OTHER NORMAL PREGNANCY IN THIRD TRIMESTER: Primary | ICD-10-CM

## 2022-10-04 PROCEDURE — 99207 PR PRENATAL VISIT: CPT | Performed by: OBSTETRICS & GYNECOLOGY

## 2022-10-04 PROCEDURE — 59025 FETAL NON-STRESS TEST: CPT | Performed by: OBSTETRICS & GYNECOLOGY

## 2022-10-06 ENCOUNTER — ANESTHESIA EVENT (OUTPATIENT)
Dept: OBGYN | Facility: CLINIC | Age: 32
End: 2022-10-06
Payer: COMMERCIAL

## 2022-10-06 ENCOUNTER — HOSPITAL ENCOUNTER (INPATIENT)
Facility: CLINIC | Age: 32
LOS: 2 days | Discharge: HOME HEALTH | End: 2022-10-08
Attending: OBSTETRICS & GYNECOLOGY | Admitting: OBSTETRICS & GYNECOLOGY
Payer: COMMERCIAL

## 2022-10-06 ENCOUNTER — ANESTHESIA (OUTPATIENT)
Dept: OBGYN | Facility: CLINIC | Age: 32
End: 2022-10-06
Payer: COMMERCIAL

## 2022-10-06 PROBLEM — Z34.90 PREGNANCY: Status: ACTIVE | Noted: 2022-10-06

## 2022-10-06 LAB
ABO/RH(D): NORMAL
ANTIBODY SCREEN: NEGATIVE
ERYTHROCYTE [DISTWIDTH] IN BLOOD BY AUTOMATED COUNT: 12.9 % (ref 10–15)
HCT VFR BLD AUTO: 37.6 % (ref 35–47)
HGB BLD-MCNC: 13.3 G/DL (ref 11.7–15.7)
MCH RBC QN AUTO: 30.7 PG (ref 26.5–33)
MCHC RBC AUTO-ENTMCNC: 35.4 G/DL (ref 31.5–36.5)
MCV RBC AUTO: 87 FL (ref 78–100)
PLATELET # BLD AUTO: 136 10E3/UL (ref 150–450)
RBC # BLD AUTO: 4.33 10E6/UL (ref 3.8–5.2)
SPECIMEN EXPIRATION DATE: NORMAL
T PALLIDUM AB SER QL: NONREACTIVE
WBC # BLD AUTO: 9.1 10E3/UL (ref 4–11)

## 2022-10-06 PROCEDURE — 258N000003 HC RX IP 258 OP 636: Performed by: OBSTETRICS & GYNECOLOGY

## 2022-10-06 PROCEDURE — 86780 TREPONEMA PALLIDUM: CPT | Performed by: OBSTETRICS & GYNECOLOGY

## 2022-10-06 PROCEDURE — 250N000009 HC RX 250

## 2022-10-06 PROCEDURE — 85014 HEMATOCRIT: CPT | Performed by: OBSTETRICS & GYNECOLOGY

## 2022-10-06 PROCEDURE — 250N000011 HC RX IP 250 OP 636

## 2022-10-06 PROCEDURE — 10907ZC DRAINAGE OF AMNIOTIC FLUID, THERAPEUTIC FROM PRODUCTS OF CONCEPTION, VIA NATURAL OR ARTIFICIAL OPENING: ICD-10-PCS | Performed by: OBSTETRICS & GYNECOLOGY

## 2022-10-06 PROCEDURE — 86901 BLOOD TYPING SEROLOGIC RH(D): CPT | Performed by: OBSTETRICS & GYNECOLOGY

## 2022-10-06 PROCEDURE — 00HU33Z INSERTION OF INFUSION DEVICE INTO SPINAL CANAL, PERCUTANEOUS APPROACH: ICD-10-PCS | Performed by: ANESTHESIOLOGY

## 2022-10-06 PROCEDURE — 370N000003 HC ANESTHESIA WARD SERVICE

## 2022-10-06 PROCEDURE — 120N000002 HC R&B MED SURG/OB UMMC

## 2022-10-06 PROCEDURE — 722N000001 HC LABOR CARE VAGINAL DELIVERY SINGLE

## 2022-10-06 PROCEDURE — 0UCC7ZZ EXTIRPATION OF MATTER FROM CERVIX, VIA NATURAL OR ARTIFICIAL OPENING: ICD-10-PCS | Performed by: OBSTETRICS & GYNECOLOGY

## 2022-10-06 PROCEDURE — 86850 RBC ANTIBODY SCREEN: CPT | Performed by: OBSTETRICS & GYNECOLOGY

## 2022-10-06 PROCEDURE — 250N000011 HC RX IP 250 OP 636: Performed by: OBSTETRICS & GYNECOLOGY

## 2022-10-06 PROCEDURE — 258N000003 HC RX IP 258 OP 636

## 2022-10-06 PROCEDURE — 250N000013 HC RX MED GY IP 250 OP 250 PS 637

## 2022-10-06 PROCEDURE — 0KQM0ZZ REPAIR PERINEUM MUSCLE, OPEN APPROACH: ICD-10-PCS | Performed by: OBSTETRICS & GYNECOLOGY

## 2022-10-06 PROCEDURE — 59400 OBSTETRICAL CARE: CPT | Mod: GC | Performed by: OBSTETRICS & GYNECOLOGY

## 2022-10-06 PROCEDURE — 258N000003 HC RX IP 258 OP 636: Performed by: STUDENT IN AN ORGANIZED HEALTH CARE EDUCATION/TRAINING PROGRAM

## 2022-10-06 PROCEDURE — 250N000011 HC RX IP 250 OP 636: Performed by: STUDENT IN AN ORGANIZED HEALTH CARE EDUCATION/TRAINING PROGRAM

## 2022-10-06 PROCEDURE — 3E0R3BZ INTRODUCTION OF ANESTHETIC AGENT INTO SPINAL CANAL, PERCUTANEOUS APPROACH: ICD-10-PCS | Performed by: ANESTHESIOLOGY

## 2022-10-06 RX ORDER — ACETAMINOPHEN 325 MG/1
650 TABLET ORAL EVERY 4 HOURS PRN
Status: DISCONTINUED | OUTPATIENT
Start: 2022-10-06 | End: 2022-10-08 | Stop reason: HOSPADM

## 2022-10-06 RX ORDER — TRANEXAMIC ACID 10 MG/ML
1 INJECTION, SOLUTION INTRAVENOUS EVERY 30 MIN PRN
Status: DISCONTINUED | OUTPATIENT
Start: 2022-10-06 | End: 2022-10-08 | Stop reason: HOSPADM

## 2022-10-06 RX ORDER — OXYTOCIN 10 [USP'U]/ML
INJECTION, SOLUTION INTRAMUSCULAR; INTRAVENOUS
Status: DISCONTINUED
Start: 2022-10-06 | End: 2022-10-06 | Stop reason: HOSPADM

## 2022-10-06 RX ORDER — METHYLERGONOVINE MALEATE 0.2 MG/ML
200 INJECTION INTRAVENOUS
Status: DISCONTINUED | OUTPATIENT
Start: 2022-10-06 | End: 2022-10-08 | Stop reason: HOSPADM

## 2022-10-06 RX ORDER — PROCHLORPERAZINE 25 MG
25 SUPPOSITORY, RECTAL RECTAL EVERY 12 HOURS PRN
Status: DISCONTINUED | OUTPATIENT
Start: 2022-10-06 | End: 2022-10-06 | Stop reason: HOSPADM

## 2022-10-06 RX ORDER — LIDOCAINE 40 MG/G
CREAM TOPICAL
Status: DISCONTINUED | OUTPATIENT
Start: 2022-10-06 | End: 2022-10-06 | Stop reason: HOSPADM

## 2022-10-06 RX ORDER — FENTANYL CITRATE-0.9 % NACL/PF 10 MCG/ML
100 PLASTIC BAG, INJECTION (ML) INTRAVENOUS EVERY 5 MIN PRN
Status: DISCONTINUED | OUTPATIENT
Start: 2022-10-06 | End: 2022-10-06 | Stop reason: HOSPADM

## 2022-10-06 RX ORDER — METOCLOPRAMIDE HYDROCHLORIDE 5 MG/ML
10 INJECTION INTRAMUSCULAR; INTRAVENOUS EVERY 6 HOURS PRN
Status: DISCONTINUED | OUTPATIENT
Start: 2022-10-06 | End: 2022-10-06 | Stop reason: HOSPADM

## 2022-10-06 RX ORDER — LIDOCAINE HYDROCHLORIDE 10 MG/ML
INJECTION, SOLUTION EPIDURAL; INFILTRATION; INTRACAUDAL; PERINEURAL
Status: DISCONTINUED
Start: 2022-10-06 | End: 2022-10-06 | Stop reason: HOSPADM

## 2022-10-06 RX ORDER — ONDANSETRON 4 MG/1
4 TABLET, ORALLY DISINTEGRATING ORAL EVERY 6 HOURS PRN
Status: DISCONTINUED | OUTPATIENT
Start: 2022-10-06 | End: 2022-10-06 | Stop reason: HOSPADM

## 2022-10-06 RX ORDER — METHYLERGONOVINE MALEATE 0.2 MG/ML
200 INJECTION INTRAVENOUS
Status: DISCONTINUED | OUTPATIENT
Start: 2022-10-06 | End: 2022-10-06 | Stop reason: HOSPADM

## 2022-10-06 RX ORDER — CARBOPROST TROMETHAMINE 250 UG/ML
250 INJECTION, SOLUTION INTRAMUSCULAR
Status: DISCONTINUED | OUTPATIENT
Start: 2022-10-06 | End: 2022-10-06 | Stop reason: HOSPADM

## 2022-10-06 RX ORDER — OXYTOCIN/0.9 % SODIUM CHLORIDE 30/500 ML
100-340 PLASTIC BAG, INJECTION (ML) INTRAVENOUS CONTINUOUS PRN
Status: DISCONTINUED | OUTPATIENT
Start: 2022-10-06 | End: 2022-10-08 | Stop reason: HOSPADM

## 2022-10-06 RX ORDER — OXYTOCIN/0.9 % SODIUM CHLORIDE 30/500 ML
PLASTIC BAG, INJECTION (ML) INTRAVENOUS
Status: COMPLETED
Start: 2022-10-06 | End: 2022-10-06

## 2022-10-06 RX ORDER — HYDROCORTISONE 25 MG/G
CREAM TOPICAL 3 TIMES DAILY PRN
Status: DISCONTINUED | OUTPATIENT
Start: 2022-10-06 | End: 2022-10-08 | Stop reason: HOSPADM

## 2022-10-06 RX ORDER — MISOPROSTOL 200 UG/1
TABLET ORAL
Status: DISCONTINUED
Start: 2022-10-06 | End: 2022-10-06 | Stop reason: HOSPADM

## 2022-10-06 RX ORDER — FENTANYL/ROPIVACAINE/NS/PF 2MCG/ML-.1
PLASTIC BAG, INJECTION (ML) EPIDURAL
Status: DISCONTINUED | OUTPATIENT
Start: 2022-10-06 | End: 2022-10-06 | Stop reason: HOSPADM

## 2022-10-06 RX ORDER — MISOPROSTOL 200 UG/1
400 TABLET ORAL
Status: DISCONTINUED | OUTPATIENT
Start: 2022-10-06 | End: 2022-10-08 | Stop reason: HOSPADM

## 2022-10-06 RX ORDER — OXYTOCIN/0.9 % SODIUM CHLORIDE 30/500 ML
340 PLASTIC BAG, INJECTION (ML) INTRAVENOUS CONTINUOUS PRN
Status: DISCONTINUED | OUTPATIENT
Start: 2022-10-06 | End: 2022-10-06 | Stop reason: HOSPADM

## 2022-10-06 RX ORDER — NALOXONE HYDROCHLORIDE 0.4 MG/ML
0.4 INJECTION, SOLUTION INTRAMUSCULAR; INTRAVENOUS; SUBCUTANEOUS
Status: DISCONTINUED | OUTPATIENT
Start: 2022-10-06 | End: 2022-10-06 | Stop reason: HOSPADM

## 2022-10-06 RX ORDER — PROCHLORPERAZINE MALEATE 10 MG
10 TABLET ORAL EVERY 6 HOURS PRN
Status: DISCONTINUED | OUTPATIENT
Start: 2022-10-06 | End: 2022-10-06 | Stop reason: HOSPADM

## 2022-10-06 RX ORDER — MODIFIED LANOLIN
OINTMENT (GRAM) TOPICAL
Status: DISCONTINUED | OUTPATIENT
Start: 2022-10-06 | End: 2022-10-08 | Stop reason: HOSPADM

## 2022-10-06 RX ORDER — NALBUPHINE HYDROCHLORIDE 10 MG/ML
2.5-5 INJECTION, SOLUTION INTRAMUSCULAR; INTRAVENOUS; SUBCUTANEOUS EVERY 6 HOURS PRN
Status: DISCONTINUED | OUTPATIENT
Start: 2022-10-06 | End: 2022-10-08 | Stop reason: HOSPADM

## 2022-10-06 RX ORDER — OXYTOCIN 10 [USP'U]/ML
10 INJECTION, SOLUTION INTRAMUSCULAR; INTRAVENOUS
Status: DISCONTINUED | OUTPATIENT
Start: 2022-10-06 | End: 2022-10-06 | Stop reason: HOSPADM

## 2022-10-06 RX ORDER — KETOROLAC TROMETHAMINE 30 MG/ML
30 INJECTION, SOLUTION INTRAMUSCULAR; INTRAVENOUS
Status: DISCONTINUED | OUTPATIENT
Start: 2022-10-06 | End: 2022-10-08 | Stop reason: HOSPADM

## 2022-10-06 RX ORDER — DOCUSATE SODIUM 100 MG/1
100 CAPSULE, LIQUID FILLED ORAL DAILY
Status: DISCONTINUED | OUTPATIENT
Start: 2022-10-06 | End: 2022-10-08 | Stop reason: HOSPADM

## 2022-10-06 RX ORDER — OXYTOCIN/0.9 % SODIUM CHLORIDE 30/500 ML
1-24 PLASTIC BAG, INJECTION (ML) INTRAVENOUS CONTINUOUS
Status: DISCONTINUED | OUTPATIENT
Start: 2022-10-06 | End: 2022-10-06 | Stop reason: HOSPADM

## 2022-10-06 RX ORDER — TRANEXAMIC ACID 10 MG/ML
1 INJECTION, SOLUTION INTRAVENOUS EVERY 30 MIN PRN
Status: DISCONTINUED | OUTPATIENT
Start: 2022-10-06 | End: 2022-10-06 | Stop reason: HOSPADM

## 2022-10-06 RX ORDER — IBUPROFEN 800 MG/1
800 TABLET, FILM COATED ORAL
Status: DISCONTINUED | OUTPATIENT
Start: 2022-10-06 | End: 2022-10-08 | Stop reason: HOSPADM

## 2022-10-06 RX ORDER — OXYTOCIN 10 [USP'U]/ML
10 INJECTION, SOLUTION INTRAMUSCULAR; INTRAVENOUS
Status: DISCONTINUED | OUTPATIENT
Start: 2022-10-06 | End: 2022-10-08 | Stop reason: HOSPADM

## 2022-10-06 RX ORDER — OXYTOCIN/0.9 % SODIUM CHLORIDE 30/500 ML
340 PLASTIC BAG, INJECTION (ML) INTRAVENOUS CONTINUOUS PRN
Status: DISCONTINUED | OUTPATIENT
Start: 2022-10-06 | End: 2022-10-08 | Stop reason: HOSPADM

## 2022-10-06 RX ORDER — NALOXONE HYDROCHLORIDE 0.4 MG/ML
0.2 INJECTION, SOLUTION INTRAMUSCULAR; INTRAVENOUS; SUBCUTANEOUS
Status: DISCONTINUED | OUTPATIENT
Start: 2022-10-06 | End: 2022-10-06 | Stop reason: HOSPADM

## 2022-10-06 RX ORDER — METOCLOPRAMIDE 10 MG/1
10 TABLET ORAL EVERY 6 HOURS PRN
Status: DISCONTINUED | OUTPATIENT
Start: 2022-10-06 | End: 2022-10-06 | Stop reason: HOSPADM

## 2022-10-06 RX ORDER — MISOPROSTOL 200 UG/1
800 TABLET ORAL
Status: DISCONTINUED | OUTPATIENT
Start: 2022-10-06 | End: 2022-10-08 | Stop reason: HOSPADM

## 2022-10-06 RX ORDER — ONDANSETRON 2 MG/ML
4 INJECTION INTRAMUSCULAR; INTRAVENOUS EVERY 6 HOURS PRN
Status: DISCONTINUED | OUTPATIENT
Start: 2022-10-06 | End: 2022-10-06 | Stop reason: HOSPADM

## 2022-10-06 RX ORDER — ONDANSETRON 4 MG/1
4 TABLET, ORALLY DISINTEGRATING ORAL EVERY 6 HOURS PRN
Status: DISCONTINUED | OUTPATIENT
Start: 2022-10-06 | End: 2022-10-08 | Stop reason: HOSPADM

## 2022-10-06 RX ORDER — CITRIC ACID/SODIUM CITRATE 334-500MG
30 SOLUTION, ORAL ORAL
Status: DISCONTINUED | OUTPATIENT
Start: 2022-10-06 | End: 2022-10-06 | Stop reason: HOSPADM

## 2022-10-06 RX ORDER — CARBOPROST TROMETHAMINE 250 UG/ML
250 INJECTION, SOLUTION INTRAMUSCULAR
Status: DISCONTINUED | OUTPATIENT
Start: 2022-10-06 | End: 2022-10-08 | Stop reason: HOSPADM

## 2022-10-06 RX ORDER — BISACODYL 10 MG
10 SUPPOSITORY, RECTAL RECTAL DAILY PRN
Status: DISCONTINUED | OUTPATIENT
Start: 2022-10-06 | End: 2022-10-08 | Stop reason: HOSPADM

## 2022-10-06 RX ORDER — MISOPROSTOL 200 UG/1
400 TABLET ORAL
Status: DISCONTINUED | OUTPATIENT
Start: 2022-10-06 | End: 2022-10-06 | Stop reason: HOSPADM

## 2022-10-06 RX ORDER — LIDOCAINE HYDROCHLORIDE AND EPINEPHRINE 15; 5 MG/ML; UG/ML
3 INJECTION, SOLUTION EPIDURAL
Status: DISCONTINUED | OUTPATIENT
Start: 2022-10-06 | End: 2022-10-06 | Stop reason: HOSPADM

## 2022-10-06 RX ORDER — IBUPROFEN 800 MG/1
800 TABLET, FILM COATED ORAL EVERY 6 HOURS PRN
Status: DISCONTINUED | OUTPATIENT
Start: 2022-10-06 | End: 2022-10-08 | Stop reason: HOSPADM

## 2022-10-06 RX ORDER — MISOPROSTOL 200 UG/1
800 TABLET ORAL
Status: DISCONTINUED | OUTPATIENT
Start: 2022-10-06 | End: 2022-10-06 | Stop reason: HOSPADM

## 2022-10-06 RX ORDER — SODIUM CHLORIDE, SODIUM LACTATE, POTASSIUM CHLORIDE, CALCIUM CHLORIDE 600; 310; 30; 20 MG/100ML; MG/100ML; MG/100ML; MG/100ML
INJECTION, SOLUTION INTRAVENOUS CONTINUOUS PRN
Status: DISCONTINUED | OUTPATIENT
Start: 2022-10-06 | End: 2022-10-06 | Stop reason: HOSPADM

## 2022-10-06 RX ADMIN — BUPIVACAINE HYDROCHLORIDE 10 ML: 2.5 INJECTION, SOLUTION EPIDURAL; INFILTRATION; INTRACAUDAL at 14:34

## 2022-10-06 RX ADMIN — ROPIVACAINE HYDROCHLORIDE: 2 INJECTION, SOLUTION EPIDURAL; INFILTRATION at 16:18

## 2022-10-06 RX ADMIN — SODIUM CHLORIDE, POTASSIUM CHLORIDE, SODIUM LACTATE AND CALCIUM CHLORIDE 1000 ML: 600; 310; 30; 20 INJECTION, SOLUTION INTRAVENOUS at 14:08

## 2022-10-06 RX ADMIN — IBUPROFEN 800 MG: 800 TABLET ORAL at 23:54

## 2022-10-06 RX ADMIN — ACETAMINOPHEN 650 MG: 325 TABLET, FILM COATED ORAL at 19:26

## 2022-10-06 RX ADMIN — KETOROLAC TROMETHAMINE 30 MG: 30 INJECTION, SOLUTION INTRAMUSCULAR at 18:42

## 2022-10-06 RX ADMIN — SODIUM CHLORIDE, POTASSIUM CHLORIDE, SODIUM LACTATE AND CALCIUM CHLORIDE: 600; 310; 30; 20 INJECTION, SOLUTION INTRAVENOUS at 09:04

## 2022-10-06 RX ADMIN — Medication 2 MILLI-UNITS/MIN: at 09:06

## 2022-10-06 ASSESSMENT — ACTIVITIES OF DAILY LIVING (ADL)
ADLS_ACUITY_SCORE: 20

## 2022-10-06 NOTE — PROGRESS NOTES
Patient arrived for scheduled induction at 0728. Monitors applied, admission and room orientation compete. VSS. See flowsheets for FHR and [place]. Plan to start pitocin. Pt would eventually like an epidural.

## 2022-10-06 NOTE — H&P
[facility]  OB HISTORY AND PHYSICAL    Patient: Chanel Vázquez   MRN#: [id number]  YOB: 1990     HPI: Chanel Vázquez is a 32 year old  at 39w1d by 8w6d US who presents today for elective IOL. She reports good fetal movement. Denies LOF, vaginal bleeding, or painful contractions. Did recently have COVID 2022, symptoms fatigue, cough, body aches. Has been feeling better from this. No symptoms today.     She denies fever, chills, headache, vision changes, SOB, chest pain, palpitations, nausea or bowel or bladder symptoms.    Pregnancy notable for:   - Cervical insufficiency, s/p Hx indicated Damian Cerclage    Her previous pregnancies were notable for short cervix and funneling necessitating an exam indicated cerclage. Ultimately had vaginal delivery at 37w0d.     No history of asthma or high blood pressure.    OBGYN History:   OB History    Para Term  AB Living   2 1 1 0 0 1   SAB IAB Ectopic Multiple Live Births   0 0 0 0 1      # Outcome Date GA Lbr Simone/2nd Weight Sex Delivery Anes PTL Lv   2 Current            1 Term 20 37w0d 06:45 / 00:17 2.87 kg (6 lb 5.2 oz) F Vag-Spont EPI N GLORIA      Name: BHARATIFEMALE-CHANEL      Apgar1: 9  Apgar5: 9       Prenatal Lab Results:  Lab Results   Component Value Date    ABO A 2020    RH Pos 2020    AS Negative 2022    HEPBANG Nonreactive 2022    CHPCRT Negative 2022    GCPCRT Negative 2022    HGB 13.1 2022     GBS Status:   Lab Results   Component Value Date    GBS Negative 2020       Patient Active Problem List    Diagnosis Date Noted     Pregnancy 10/06/2022     Priority: Medium     Cervical cerclage suture present 2022     Priority: Medium     History of  delivery 2022     Priority: Medium     Need for Tdap vaccination 2022     Priority: Medium     History of cervical cerclage 2021     Priority:  Medium     Suspected shortening of cervix not found 01/08/2020     Priority: Medium     Short cervical length during pregnancy 01/07/2020     Priority: Medium       Past Medical History  Past Medical History:   Diagnosis Date     Varicella      Past Surgical History  Past Surgical History:   Procedure Laterality Date     AS REPAIR CRUCIATE LIGAMENT,KNEE  2010     CERCLAGE CERVICAL N/A 1/7/2020    Procedure: CERCLAGE, CERVIX, VAGINAL APPROACH;  Surgeon: Ronny Rosario MD;  Location: [facility]     CERCLAGE CERVICAL N/A 4/8/2022    Procedure: CERCLAGE, CERVIX, VAGINAL APPROACH;  Surgeon: Pricilla Ponce MD;  Location: [facility]     ELBOW SURGERY       Family History  Family History   Problem Relation Age of Onset     Depression Mother      Anxiety Disorder Mother      Asthma Father      Rheumatoid Arthritis Father      Depression Brother      Anxiety Disorder Brother      Hyperlipidemia Brother      Diabetes Maternal Grandmother      Diabetes Maternal Grandfather      Heart Disease Maternal Grandfather      Diabetes Paternal Grandmother      Rheumatoid Arthritis Paternal Grandfather      Cancer Other         Paternal Grandfather     Glaucoma No family hx of      Macular Degeneration No family hx of      Social History  Social History     Tobacco Use     Smoking status: Former Smoker     Packs/day: 0.00     Types: Cigarettes     Smokeless tobacco: Never Used   Substance Use Topics     Alcohol use: Not Currently     Comment: Occasional     Medications  Medications Prior to Admission   Medication Sig Dispense Refill Last Dose     doxylamine (UNISOM) 25 MG TABS tablet Take 25 mg by mouth At Bedtime   10/5/2022 at Unknown time     Prenatal Vit-Fe Fumarate-FA (PRENATAL MULTIVITAMIN W/IRON) 27-0.8 MG tablet Take 1 tablet by mouth daily   10/5/2022 at Unknown time     Allergies  No Known Allergies     REVIEW OF SYSTEMS:  A 10 point review of systems was completed and was negative other than as noted in the HPI.    PHYSICAL  EXAM  LMP 2022   Gen: NAD  CV: Regular rate, well perfused  Lungs: CTAB, non-labored breathing  Abd: Gravid, non-tender, non-distended  Ext: trace peripheral extremity edema    SVE: /-2    Membranes: intact  Presentation: cephalic by BSUS.  Estimated Fetal Weight: 7# by leopolds    FHT:  Monitoring External  FHT: Baseline 140 bpm; moderate variability; + accels; no decelerations  TOCO 0-1 contractions in 10 minutes    Studies: T&S, CBC, RPR    Assessment & Plan: 32 year old  at 39w1d by 8w6d US admitted for elective IOL. Pregnancy is notable for cervical insufficiency s/p hx indicated cerclage placement.     # Induction of Labor  Cervix: /-2  - Admit for IOL  - Will plan to start with pitocin   - Membranes: intact  - Augmentation: Pitocin, AROM prn  - Labs: CBC, T&S, RPR  - GBS negative; Antibiotics not indicated  - Pain Control: Per patient request; Discussed options. Patient would like epidural  - Diet: Regular  - PPH Meds: Standard Meds  - PPx: SCDs for prolonged periods of immobility    # PNC  - Rh positive, Rubella immune, GCT failed, passed GTT, GBS negative  - Other prenatal labs wnl  - Imaging: placenta posterior  - s/p flu, Tdap vaccines  - Contraception: would like IUD at 6 week post partum visit  Feeding: Planning to try and breast feed     # FWB:   Cat I tracing, reactive, reassuring  - Continuous Fetal Monitoring  - Intrauterine resuscitative measures prn    Patient seen and careplan discussed under the supervision of Dr. Zoey Kumar DO, MS  Obstetrics, Gynecology & Women's Health   Resident, PGY-2  10/06/2022 8:23 AM

## 2022-10-06 NOTE — PLAN OF CARE
Goal Outcome Evaluation:  Patient not uncomfortable but would like epidural before AROM. MDA called and in room. Patient and procedure correctly identified/ verified with MDA. Consent signed. 1000 mL fluid bolus started prior to epidural placement.  Epidural placed by MDA without complication. Test dose/ bolus given by MDA, patient tolerated well. VSS. Pitocin running per orders. See flowsheets for FHR and [place]. Will continue with current plan of care and notify provider of any changes in maternal/fetal status.

## 2022-10-06 NOTE — PROGRESS NOTES
[facility] PROGRESS NOTE:   2022 4:13 PM         SUBJECTIVE:   Patient reports she's comfortable with epidural.  Ok with AROM           OBJECTIVE:     Vitals:    10/06/22 1504 10/06/22 1506 10/06/22 1509 10/06/22 1512   BP:  119/72  118/69   Resp:       Temp:       TempSrc:       SpO2: 99%  99%          NST:  reactive  FHT:  125/mod/+ accels, no decels  Cat:   1  [place]:  q 2-4 min  SVE:  7/80/-1 s/p AROM of clear fluid             ASSESSMENT / PLAN:   32 year old  at 39w1d admitted for eIOL.     Complications:  S/p double Damian cerclage in pregnancy     Labor: s/p AROM, clear.  cont pitocin.    Fetal well being: Category 1 tracing.  GBS: neg  Pain: epidural adequate     Brenda Greer MD

## 2022-10-06 NOTE — PROGRESS NOTES
Labor Progress Note     S: Feeling comfortable, walking around a bit. Open to epidural then breaking her water after that.    O:  /74   Temp 98.4  F (36.9  C) (Oral)   Resp 16   LMP 2022   General: NAD  VE: 5/80/-2 per RN    FHT: Baseline 135 bpm, moderate variability, accelerations present, no decelerations  TOCO: 3 contractions in ten minutes    A/P: 32 year old  at 39w1d, here for elective IOL. Pregnancy notable for hx COVID, s/p history-indicated cerclage.    - Induction: 4cm on admission, making some change. Continue pitocin, titrate per protocol.  - Pain: moving towards epidural now  - Membranes: agreeable to AROM after epidural  - FWB: Category I, reactive and reassuring.    Anticipate     Olinda Latif MD  ObGyn, PGY-3  10/06/2022 1:32 PM

## 2022-10-06 NOTE — ANESTHESIA PREPROCEDURE EVALUATION
Anesthesia Pre-Procedure Evaluation    Patient: Chanel Vázquez   MRN: [id number] : 1990        Procedure :           Past Medical History:   Diagnosis Date     Varicella       Past Surgical History:   Procedure Laterality Date     AS REPAIR CRUCIATE LIGAMENT,KNEE  2010     CERCLAGE CERVICAL N/A 2020    Procedure: CERCLAGE, CERVIX, VAGINAL APPROACH;  Surgeon: Ronny Rosario MD;  [facility]: [facility]     CERCLAGE CERVICAL N/A 2022    Procedure: CERCLAGE, CERVIX, VAGINAL APPROACH;  Surgeon: Pricilla Ponce MD;  [facility]: [facility]     ELBOW SURGERY        No Known Allergies   Social History     Tobacco Use     Smoking status: Former Smoker     Packs/day: 0.00     Types: Cigarettes     Smokeless tobacco: Never Used   Substance Use Topics     Alcohol use: Not Currently     Comment: Occasional      Wt Readings from Last 1 Encounters:   10/04/22 92.5 kg (204 lb)        Anesthesia Evaluation   Pt has had prior anesthetic. Type: [facility] and OB Labor Epidural.    No history of anesthetic complications       ROS/MED HX  ENT/Pulmonary:  - neg pulmonary ROS     Neurologic:  - neg neurologic ROS     Cardiovascular:  - neg cardiovascular ROS     METS/Exercise Tolerance:     Hematologic:  - neg hematologic  ROS     Musculoskeletal:       GI/Hepatic:  - neg GI/hepatic ROS     Renal/Genitourinary:       Endo:  - neg endo ROS     Psychiatric/Substance Use:  - neg psychiatric ROS     Infectious Disease:       Malignancy:       Other:               OUTSIDE LABS:  CBC:   Lab Results   Component Value Date    WBC 9.1 10/06/2022    WBC 6.7 2022    HGB 13.3 10/06/2022    HGB 13.1 2022    HCT 37.6 10/06/2022    HCT 36.3 2022     (L) 10/06/2022     2022     BMP:   Lab Results   Component Value Date    CR 0.69 2020    GLC 96 2021     COAGS: No results found for: PTT, INR, FIBR  POC:   Lab Results   Component Value Date    HCG Negative 2020     HEPATIC:   Lab Results    Component Value Date    ALT 28 05/01/2020    AST 27 05/01/2020     OTHER: No results found for: PH, LACT, A1C, ANOOP, PHOS, MAG, LIPASE, AMYLASE, TSH, T4, T3, CRP, SED    Anesthesia Plan    ASA Status:  2      Anesthesia Type: Epidural.              Consents    Anesthesia Plan(s) and associated risks, benefits, and realistic alternatives discussed. Questions answered and patient/representative(s) expressed understanding.    - Discussed:     - Discussed with:  Patient         Postoperative Care            Comments:                Rossy Lei MD

## 2022-10-06 NOTE — ANESTHESIA PROCEDURE NOTES
Epidural catheter Procedure Note    Pre-Procedure   Staff -        Anesthesiologist:  Daja Cordoba MD       Resident/Fellow: Joo Hassan MD       Performed By: resident       Location: OB       Procedure Start/Stop Times: 10/6/2022 2:15 PM       Pre-Anesthestic Checklist: patient identified, IV checked, risks and benefits discussed, informed consent, monitors and equipment checked, pre-op evaluation, at physician/surgeon's request and post-op pain management  Timeout:       Correct Patient: Yes        Correct Procedure: Yes        Correct Site: Yes        Correct Position: Yes   Procedure Documentation  Procedure: epidural catheter       Diagnosis: Pregnancy       Patient Position: sitting       Skin prep: DuraPrep and Chloraprep       Local skin infiltrated with 3 mL of 1% lidocaine.        Insertion Site: L3-4. (midline approach).       Technique: LORT saline        Needle Type: Neolinear needle       Needle Gauge: 17.        Needle Length (Inches): 3.5        Catheter: 18 G.          Catheter threaded easily.         5 cm epidural space.         Threaded 11 cm at skin.         # of attempts: 1 and  # of redirects:  0    Assessment/Narrative         Paresthesias: Resolved.       Test dose of 3 mL lidocaine 1.5% w/ 1:200,000 epinephrine at 14:30 CDT.         Test dose negative, 3 minutes after injection, for signs of intravascular, subdural, or intrathecal injection.       Insertion/Infusion Method: LORT saline       No aspiration negative for Heme or CSF via Epidural Catheter.       Sensory Level Left: T10.       Sensory Level Right: T10.    Medication(s) Administered   0.125% bupivacaine 5 mL + fentanyl 20 mcg + NS 5 mL (Epidural) (Mixture components: bupivacaine HCl (PF) 0.25 % Soln, 5 mL; fentaNYL (PF) 100 MCG/2ML Soln, 20 mcg; sodium chloride 0.9 % Soln, 5 mL) - EPIDURAL   10 mL - 10/6/2022 2:34:00 PM  Medication Administration Time: 10/6/2022 2:15 PM

## 2022-10-06 NOTE — PROGRESS NOTES
[facility] PROGRESS NOTE:   2022 12:47 PM         SUBJECTIVE:   Patient reports mild cramping           OBJECTIVE:     Vitals:    10/06/22 0746 10/06/22 1104   BP: 129/80 107/74   Resp: 16 16   Temp: 98.5  F (36.9  C) 98.4  F (36.9  C)   TempSrc: Oral Oral         NST:  reactive  FHT:  135/mod/+ accels, no decels  Cat:   1  [place]:  q 3-5 min  SVE:  5/80/-2             ASSESSMENT / PLAN:   32 year old  at 39w1d admitted for eIOL.    Complications:  S/p double Damian cerclage in pregnancy    Labor: cont pitocin.  Plan AROM when appropriate (after epidural)  Fetal well being: Category 1 tracing.  GBS: neg  Pain: epidural at patient request.  Anesthesia aware.    Brenda Greer MD

## 2022-10-07 ENCOUNTER — TELEPHONE (OUTPATIENT)
Dept: OBGYN | Facility: CLINIC | Age: 32
End: 2022-10-07

## 2022-10-07 LAB
ERYTHROCYTE [DISTWIDTH] IN BLOOD BY AUTOMATED COUNT: 13 % (ref 10–15)
HCT VFR BLD AUTO: 34.4 % (ref 35–47)
HGB BLD-MCNC: 11.4 G/DL (ref 11.7–15.7)
MCH RBC QN AUTO: 30.2 PG (ref 26.5–33)
MCHC RBC AUTO-ENTMCNC: 33.1 G/DL (ref 31.5–36.5)
MCV RBC AUTO: 91 FL (ref 78–100)
PLATELET # BLD AUTO: 110 10E3/UL (ref 150–450)
RBC # BLD AUTO: 3.78 10E6/UL (ref 3.8–5.2)
WBC # BLD AUTO: 9.6 10E3/UL (ref 4–11)

## 2022-10-07 PROCEDURE — 120N000002 HC R&B MED SURG/OB UMMC

## 2022-10-07 PROCEDURE — 36415 COLL VENOUS BLD VENIPUNCTURE: CPT

## 2022-10-07 PROCEDURE — 85027 COMPLETE CBC AUTOMATED: CPT

## 2022-10-07 PROCEDURE — 250N000013 HC RX MED GY IP 250 OP 250 PS 637

## 2022-10-07 RX ORDER — ACETAMINOPHEN 325 MG/1
650 TABLET ORAL EVERY 6 HOURS PRN
Qty: 100 TABLET | Refills: 0 | Status: SHIPPED | OUTPATIENT
Start: 2022-10-07 | End: 2023-07-14

## 2022-10-07 RX ORDER — AMOXICILLIN 250 MG
1 CAPSULE ORAL DAILY
Qty: 100 TABLET | Refills: 0 | Status: SHIPPED | OUTPATIENT
Start: 2022-10-07 | End: 2023-07-14

## 2022-10-07 RX ORDER — IBUPROFEN 600 MG/1
600 TABLET, FILM COATED ORAL EVERY 6 HOURS PRN
Qty: 60 TABLET | Refills: 0 | Status: SHIPPED | OUTPATIENT
Start: 2022-10-07 | End: 2023-07-14

## 2022-10-07 RX ADMIN — ACETAMINOPHEN 650 MG: 325 TABLET, FILM COATED ORAL at 09:46

## 2022-10-07 RX ADMIN — IBUPROFEN 800 MG: 800 TABLET ORAL at 07:57

## 2022-10-07 RX ADMIN — ACETAMINOPHEN 650 MG: 325 TABLET, FILM COATED ORAL at 19:00

## 2022-10-07 RX ADMIN — ACETAMINOPHEN 650 MG: 325 TABLET, FILM COATED ORAL at 14:17

## 2022-10-07 RX ADMIN — IBUPROFEN 800 MG: 800 TABLET ORAL at 14:17

## 2022-10-07 RX ADMIN — DOCUSATE SODIUM 100 MG: 100 CAPSULE, LIQUID FILLED ORAL at 07:57

## 2022-10-07 RX ADMIN — IBUPROFEN 800 MG: 800 TABLET ORAL at 21:50

## 2022-10-07 RX ADMIN — ACETAMINOPHEN 650 MG: 325 TABLET, FILM COATED ORAL at 05:11

## 2022-10-07 ASSESSMENT — ACTIVITIES OF DAILY LIVING (ADL)
ADLS_ACUITY_SCORE: 20

## 2022-10-07 NOTE — PLAN OF CARE
Problem: Plan of Care - These are the overarching goals to be used throughout the patient stay.    Goal: Optimal Comfort and Wellbeing  Outcome: Ongoing, Progressing   Goal Outcome Evaluation:Patient arrived to [facility] unit via wheelchair at 2045 ,with belongings, accompanied by spouse/ significant other, with infant in arms. Received report from  GINNY SYED  and checked bands. Unit and room orientation completd. Call light given and within arms reach; no concerns present at this time. Continue with plan of care.

## 2022-10-07 NOTE — PLAN OF CARE
Problem: Plan of Care - These are the overarching goals to be used throughout the patient stay.    Goal: Optimal Comfort and Wellbeing  10/7/2022 0655 by Farrah Garcia RN  Outcome: Ongoing, Progressing  10/6/2022 2056 by Farrah Garcia RN  Outcome: Ongoing, Progressing  Intervention: Monitor Pain and Promote Comfort  Recent Flowsheet Documentation  Taken 10/6/2022 2351 by Farrah Garcia, RN  Pain Management Interventions: medication (see MAR)  Intervention: Provide Person-Centered Care  Recent Flowsheet Documentation  Taken 10/6/2022 2351 by Farrah Garcia RN  Trust Relationship/Rapport: care explained   Data: Vital signs within normal limits. Postpartum checks within normal limits - see flow record. Patient eating and drinking normally. Patient able to empty bladder independently and is up ambulating.  Patient performing self cares and is able to care for infant.  Action: Patient medicated during the shift for pain. See MAR. Patient reassessed within 1 hour after each medication and pain was improved - patient stated she was comfortable. Patient education done about safety, call light and nursing cares.   Response: Positive attachment behaviors observed with infant.  present and supportive.

## 2022-10-07 NOTE — PLAN OF CARE
Goal Outcome Evaluation: Stable post vaginal delivery patient that is bonding well with her infant. Minimal c/o of pain in her butt and soreness in her hips. Hot packs and tyl/ibu are helping. Breast feeding with assistance to deeper latch. Infant looks to have a tight frenulum. Mother getting sore. Pediatrician aware. Continue to work on feeds. Hand expressing with 1-2mls by cup.

## 2022-10-07 NOTE — PLAN OF CARE
Data: Chanel Vázquez transferred to [id number] via wheelchair at 2045. Baby transferred via parent's arms.  Action: Receiving unit notified of transfer: Yes. Patient and family notified of room change. Report given to Joselin and Farrah RNs at bedside at time of transfer. Belongings sent to receiving unit. Accompanied by Registered Nurse. Oriented patient to surroundings. Call light within reach. ID bands double-checked with receiving RN.  Response: Patient tolerated transfer and is stable.

## 2022-10-07 NOTE — L&D DELIVERY NOTE
DELIVERY SUMMARY    Chanel Vázquez MRN# [id number]   Age: 32 year old YOB: 1990     Delivery Note:  Chanel Vázquez is a 32 year old  at 39w1d who presented to L&D for an elective induction of labor. Pregnancy was notable for history indicated double Damian Cerclage.     Upon admission, SVE was 4 cm and patient received pitocin for augmentation. AROM occurred at 1600 with clear fluid. She received an epidural for pain. The patient progressed to complete at 1740 and began pushing at that time. At 1815, she had an  of a liveborn male infant on 10/6/2022. The infant head was delivered in direct OP position. There were no nuchal cords. Apgars were 9 and 9 and 1 and 5 minutes. Weight pending. The cord was allowed to pulse for 1 minute and was then clamped and cut. The infant was then placed on the patient's abdomen for immediate skin-to-skin. Routine cord blood was obtained. IV pitocin was started for active management of the third stage. The placenta was delivered with gentle downward traction. It was found to be intact with a three vessel cord. She sustained a  second degree perineal laceration that was repaired in the usual fashion with a 3-0 Vicryl rapide. Digital examination revealed the remaining piece of the Damian stitch that was grasped and removed from the cervix without issue. Upon final inspection, there was good hemostasis and uterine tone was firm. Instrument and sharp count was correct. QBL: 172 mL    Dr. Greer was present for delivery    Elizabeth Palacios MD, MPH  Obstetrics and Gyncology, PGY-2  2022 , 7:18 PM      [place] Assessment Tool Data    Gestational Age:  Gestational Age: 39w1d     Maternal temperature range:  Temp  Av.4  F (36.9  C)  Min: 98.2  F (36.8  C)  Max: 98.5  F (36.9  C)    Membranes ruptured for:   (Delivered) Hours: 2 Minutes: 15     GBS status:  Lab Results   Component Value Date    GBS Negative 2020       Antibiotic  Status:  Antibiotics       IV Antibiotic Given       Additional Management     Fetal Status Prior to  Delivery     Fetal Status Comments        Sepsis Prebirth Score:      Sepsis Postbirth Score:      Determination based on clinical exam after birth:      Disposition:          Thong Vázquez [[id number]]    Labor Event Times    Labor onset date: 10/6/22 Onset time:  4:00 PM   Dilation complete date: 10/6/22 Complete time:  5:40 PM   Start pushing date/time: 10/6/2022 1740      Labor Length    1st Stage (hrs): 1 (min): 40   2nd Stage (hrs): 0 (min): 35   3rd Stage (hrs): 0 (min): 7      Labor Events     labor?: No   steroids: None  Labor Type: Induction/Cervical ripening  Predominate monitoring during 1st stage: continuous electronic fetal monitoring     Antibiotics received during labor?: No     Rupture identifier: Sac 1  Rupture date/time: 10/6/22 1600   Rupture type: Artificial Rupture of Membranes  Fluid color: Clear  Fluid odor: Normal     Induction date/time: 10/6/22 0906   Cervical ripening date/time:        Augmentation: Oxytocin, AROM  1:1 continuous labor support provided by?: RN Labor partogram used?: no      Delivery/Placenta Date and Time    Delivery Date: 10/6/22 Delivery Time:  6:15 PM   Placenta Date/Time: 10/6/2022  6:22 PM  Oxytocin given at the time of delivery: after delivery of baby  Delivering clinician: Brenda Greer MD   Other personnel present at delivery:  Provider Role   Chastity Shrestha RN Delivery Nurse   Rosa Briceno RN Registered Nurse         Vaginal Counts     Initial count performed by 2 team members:  Two Team Members   YEFRI QUILES RN       Marshall Suture Needles Sponges (RETIRED) Instruments   Initial counts 2  5    Added to count 0 1 0    Relief counts       Final counts 2 1 5          Placed during labor Accounted for at the end of labor   FSE No NA   IUPC No NA   Cervidil No NA              Final count  performed by 2 team members:  Two Team Members   GINNY Armenta Dr.      Final count correct?: Yes     Apgars    Living status: Living   1 Minute 5 Minute 10 Minute 15 Minute 20 Minute   Skin color: 1  1       Heart rate: 2  2       Reflex irritability: 2  2       Muscle tone: 2  2       Respiratory effort: 2  2       Total: 9  9       Apgars assigned by: WILIAN FELIX RN     Cord    Vessels: 3 Vessels    Cord Complications: None               Cord Blood Disposition: Lab    Gases Sent?: No    Delayed cord clamping?: Yes    Cord Clamping Delay (seconds):  seconds        Resuscitation    Methods: None   Care at Delivery: Infant placed skin to skin immediately at birth. Stimulated with warm blankets. Bulb suction for secretions at the mouth. Alert, lusty cry. Delayed cord clamping completed. Infant stable and to remain skin to skin with mother at this time.      Skin to Skin and Feeding Plan    Skin to skin initiation date/time: 1/10/1841    Skin to skin with: Mother  Skin to skin end date/time:        Labor Events and Shoulder Dystocia    Shoulder dystocia present?: Neg     Delivery (Maternal) (Provider to Complete) (656425)    Episiotomy: None  Perineal lacerations: 2nd    Repair suture: 3-0 Vicryl  Genital tract inspection done: Pos     Blood Loss  Mother: Chanel Vázquez #[id number]   Start of Mother's Information    Delivery Blood Loss  10/06/22 1600 - 10/06/22 1917    Delivery QBL (mL) Hospital Encounter 172 mL    Total  172 mL         End of Mother's Information  Mother: Chanel Vázquez #[id number]          Delivery - Provider to Complete (450134)    Delivering clinician: Brenda Greer MD  Attempted Delivery Types (Choose all that apply): Spontaneous Vaginal Delivery  Delivery Type (Choose the 1 that will go to the Birth History): Vaginal, Spontaneous                   Other personnel:  Provider Role   Chastity Shrestha RN Delivery Nurse   Janak  GINNY Lee Registered Nurse                Placenta    Date/Time: 10/6/2022  6:22 PM  Removal: Spontaneous  Disposition: Hospital disposal           Anesthesia    Method: Epidural  Cervical dilation at placement: 4-7                Presentation and Position    Presentation: Vertex    Position: Middle Occiput Posterior                 Elizabeth Palacios MD

## 2022-10-07 NOTE — PLAN OF CARE
Goal Outcome Evaluation:   of viable Male with Dr. Greer in attendance. Nursery RN Harrison present. Infant with spontaneous cry to mothers abdomen, dried and stimulated. Placenta delivered without complications, pitocin started, 2nd degree  laceration, with repairs done and stitch from cerclage removed after delivery. [place] cares provided. Mother and baby in stable condition.

## 2022-10-07 NOTE — DISCHARGE SUMMARY
[facility] Discharge Summary    Chanel Vázquez MRN# [id number]   Age: 32 year old YOB: 1990     Date of Admission:  10/6/2022  Date of Discharge::  10/08/2022  Admitting Physician:  Brenda Greer MD  Discharge Physician:  Shira Griffiths MD            Admission Diagnoses:   -   - IUP at 39w1d  - Cervical insufficiency, s/p Hx indicated Damian Cerclage  - gTCP          Discharge Diagnosis:   - Postpartum, s/p delivery of viable infant           Procedures:     Procedure(s): - Normal spontaneous vaginal delivery  - Epidural Spinal anesthesia  - Electronic fetal monitoring           Medications Prior to Admission:     Medications Prior to Admission   Medication Sig Dispense Refill Last Dose     Prenatal Vit-Fe Fumarate-FA (PRENATAL MULTIVITAMIN W/IRON) 27-0.8 MG tablet Take 1 tablet by mouth daily   10/5/2022 at Unknown time     [DISCONTINUED] doxylamine (UNISOM) 25 MG TABS tablet Take 25 mg by mouth At Bedtime   10/5/2022 at Unknown time             Discharge Medications:        Review of your medicines      START taking      Dose / Directions   acetaminophen 325 MG tablet  Commonly known as: TYLENOL  Used for:  (normal spontaneous vaginal delivery)      Dose: 650 mg  Take 2 tablets (650 mg) by mouth every 6 hours as needed for mild pain Start after Delivery.  Quantity: 100 tablet  Refills: 0     ibuprofen 600 MG tablet  Commonly known as: ADVIL/MOTRIN  Used for:  (normal spontaneous vaginal delivery)      Dose: 600 mg  Take 1 tablet (600 mg) by mouth every 6 hours as needed for moderate pain Start after delivery  Quantity: 60 tablet  Refills: 0     senna-docusate 8.6-50 MG tablet  Commonly known as: SENOKOT-S/PERICOLACE  Used for:  (normal spontaneous vaginal delivery)      Dose: 1 tablet  Take 1 tablet by mouth daily Start after delivery.  Quantity: 100 tablet  Refills: 0        CONTINUE these medicines which have NOT CHANGED      Dose / Directions   prenatal  multivitamin w/iron 27-0.8 MG tablet      Dose: 1 tablet  Take 1 tablet by mouth daily  Refills: 0        STOP taking    doxylamine 25 MG Tabs tablet  Commonly known as: UNISOM              Where to get your medicines      These medications were sent to [place], [place] - [address]  [address] [facility] [address], [facility] [address]    Phone: [phone]     acetaminophen 325 MG tablet    ibuprofen 600 MG tablet    senna-docusate 8.6-50 MG tablet               Consultations:   Anesthesiology          Brief History of Admission and Labor:   Chanel Vázquez is a 32 year old  at 39w1d by 8w6d US who presented on 10/06 for elective IOL. She reported good fetal movement. Denied LOF, vaginal bleeding, or painful contractions. Did recently have COVID 2022, symptoms fatigue, cough, body aches. Has been feeling better from this. No symptoms today. She denies fever, chills, headache, vision changes, SOB, chest pain, palpitations, nausea or bowel or bladder symptoms.     Delivery Note:  Chanel Vázquez is a 32 year old  at 39w1d who presented to L&D for an elective induction of labor. Pregnancy was notable for history indicated double Damian Cerclage.      Upon admission, SVE was 4 cm and patient received pitocin for augmentation. AROM occurred at 1600 with clear fluid. She received an epidural for pain. The patient progressed to complete at 1740 and began pushing at that time. At 1815, she had an  of a liveborn male infant on 10/6/2022. The infant head was delivered in direct OP position. There were no nuchal cords. Apgars were 9 and 9 and 1 and 5 minutes. Weight 3090g. The cord was allowed to pulse for 1 minute and was then clamped and cut. The infant was then placed on the patient's abdomen for immediate skin-to-skin. Routine cord blood was obtained. IV pitocin was started for active management of the third stage. The placenta was delivered with gentle downward  traction. It was found to be intact with a three vessel cord. She sustained a  second degree perineal laceration that was repaired in the usual fashion with a 3-0 Vicryl rapide. Digital examination revealed the remaining piece of the Damian stitch that was grasped and removed from the cervix without issue. Upon final inspection, there was good hemostasis and uterine tone was firm. Instrument and sharp count was correct. QBL: 172 mL          Postpartum Hospital Course:   The patient's postpartum course was unremarkable.  On discharge, her pain was well controlled. Vaginal bleeding is similar to peak menstrual flow.  Voiding without difficulty.  Ambulating well and tolerating a normal diet.  No fever.  Breastfeeding well.  Infant is stable.  She was discharged on post-partum day #2.    Post-partum hemoglobin: 11.4    Contraception: Interval IUD    Rhogam was not indicated          Discharge Instructions and Follow-Up:     Discharge diet: Regular   Discharge activity: Activity as tolerated   Discharge follow-up: Follow up with your primary OBGYN provider in six weeks for a routine postpartum visit     Wound care: Drink plenty of fluids  Ice to area for comfort  Keep wound clean and dry            Discharge Disposition:     Discharged to home     Attestation:  I have reviewed today's vital signs, notes, medications, labs and imaging.    Brenda Giraldo MD  Ob/Gyn PGY-1  10/08/22 8:03 AM    Physician Attestation   I saw and evaluated this patient prior to discharge.  I discussed the patient with the resident/fellow and agree with plan of care as documented in the note.      I personally reviewed vital signs, medications and labs.    I personally spent 5 minutes on discharge activities.    Shira Griffiths MD  Date of Service (when I saw the patient): 10/08/22

## 2022-10-07 NOTE — TELEPHONE ENCOUNTER
Short term disability claim returned to [facility]. Copy faxed to HIM.

## 2022-10-07 NOTE — PROGRESS NOTES
Postpartum Progress Note  Chanel Vázquez  [id number]    Subjective:   Patient is feeling well this morning.  Was having a little more nausea after the delivery last night that improved with zofran. Lochia light.  Eating and drinking regular food without issue.  Ambulating without difficulty, lightheadedness. Pain is adequately controlled.  Breast feeding without concerns/questions.  Voiding spontaneously. No concerns this morning.    Objective:  /78 (BP Location: Right arm, Patient Position: Semi-Pérez's, Cuff Size: Adult Regular)   Pulse 79   Temp 98.2  F (36.8  C) (Oral)   Resp 18   LMP 2022   SpO2 99%   Breastfeeding Unknown     General: NAD, comfortable  Heart: regular rate  Lungs: breathing comfortably  Abdomen: Soft, non-tender, non-distended; fundus firm  Extremities: trace edema in BLE    Labs:  Hemoglobin   Date Value Ref Range Status   10/06/2022 13.3 11.7 - 15.7 g/dL Final   2020 11.3 (L) 11.7 - 15.7 g/dL Final     Hemoglobin   Date Value Ref Range Status   10/06/2022 13.3 11.7 - 15.7 g/dL Final   2022 13.1 11.7 - 15.7 g/dL Final   2020 11.3 (L) 11.7 - 15.7 g/dL Final   2020 11.7 11.7 - 15.7 g/dL Final       Assessment/Plan: 32 year old  who is PPD#1 s/p  and cerclage string removal.  Pregnancy was notable for history indicated cerclage, for which she had a double Damian cerclage placed. Currently stable and doing well. VSS.     Postpartum  - Continue routine post-partum cares.     - Pain: well controlled on PO medications   - Heme: 13.3 >  > AM hemoglobin pending. Appropriate blood loss during delivery. No current symptoms of anemia.  - Mild thrombocytopenia noted on admission, likely gestational thrombocytopenia. Repeat CBC ordered for this AM.   - : voiding without issues  - GI: continue prn antiemetics and stool softeners   - Baby: Stable at beside, breastfeeding  - Contraception: TBD  - Rh, positive. Rhogam not indicated   -  Rubella immune  - Ppx: SCDs, encourage ambulation     Dispo: discharge to home likely PPD#2    Olinda Latif MD  ObGyn, PGY-3  10/07/2022 6:44 AM     Patient seen and examined by me, agree with above resident note. Overall doing well and meeting early goals.  Some difficulty with latch and breastfeeding, will be working with LC today.  Cont routine cares, discharge in am    CHAGO ROGER MD

## 2022-10-08 VITALS
DIASTOLIC BLOOD PRESSURE: 81 MMHG | HEART RATE: 88 BPM | TEMPERATURE: 97.9 F | OXYGEN SATURATION: 99 % | SYSTOLIC BLOOD PRESSURE: 119 MMHG | RESPIRATION RATE: 16 BRPM

## 2022-10-08 PROCEDURE — 250N000013 HC RX MED GY IP 250 OP 250 PS 637

## 2022-10-08 RX ADMIN — IBUPROFEN 800 MG: 800 TABLET ORAL at 05:06

## 2022-10-08 RX ADMIN — ACETAMINOPHEN 650 MG: 325 TABLET, FILM COATED ORAL at 02:16

## 2022-10-08 RX ADMIN — DOCUSATE SODIUM 100 MG: 100 CAPSULE, LIQUID FILLED ORAL at 09:10

## 2022-10-08 RX ADMIN — ACETAMINOPHEN 650 MG: 325 TABLET, FILM COATED ORAL at 07:28

## 2022-10-08 ASSESSMENT — ACTIVITIES OF DAILY LIVING (ADL)
ADLS_ACUITY_SCORE: 20

## 2022-10-08 NOTE — PLAN OF CARE
Problem: Plan of Care - These are the overarching goals to be used throughout the patient stay.    Goal: Readiness for Transition of Care  Outcome: Met   Goal Outcome Evaluation:    Plan of Care Reviewed With: patient, spouse     Overall Patient Progress: improving  Met goals for discharge. VSS. Pain managed with ibuprofen and tylenol. Reviewed discharge instructions and medications, patient verbalizes understanding.

## 2022-10-08 NOTE — PROGRESS NOTES
Postpartum Progress Note  Chanel Vázquez  [id number]    Subjective:   Ambulating without dizziness, eating and drinking without nausea. Lochia less than a typical period. Voiding spontaneously. Passing gas, has had a BM. Pain well controlled on oral medications. Breastfeeding without difficulty.    Objective:  Patient Vitals for the past 24 hrs:   BP Temp Temp src Pulse Resp   10/08/22 0916 119/81 97.9  F (36.6  C) Oral 88 16   10/08/22 0219 108/76 97.7  F (36.5  C) Oral 72 18   10/07/22 1845 121/80 98.6  F (37  C) Oral 82 16     General: NAD, comfortable  Heart: Regular rate, extremities warm and well-perfused  Lungs: Breathing comfortably, on room air  Abdomen: Soft, non-tender, non-distended; fundus firm below umbilicus  Extremities: No edema in BLE    Weight: 92.5 kg    Assessment/Plan: 32 year old  who is PPD#2 s/p . Pregnancy was notable for gTCP Currently stable and doing well. Desiring discharge.     Postpartum  - Continue routine post-partum care     - Heme: Appropriate blood loss during delivery. Hgb stable.   - Pain: Continue scheduled Tylenol, Ibuprofen, oxy prn  - GI: PRN senna, miralax daily, simethicone, anti-emetics.  - : Voiding spontaneously   - Baby:  Stable, breast feeding  - Contraception: IUD  - Rh positive, Rubella immune  - PPx: Encourage ambulation    Dispo: desiring discharge today      Brenda Giraldo MD  OBGYN PGY-1  6:47 AM 2022      Physician Attestation   I personally examined and evaluated this patient.  I discussed the patient with the resident/fellow and care team, and agree with the assessment and plan of care as documented in the note of 10/8/22.      I personally reviewed vital signs, medications and labs.    [place] findings: Chanel Vázquez is a 32 year old  postpartum day number 2 s/p , currently doing well and meeting goals for discharge to home.  Patient reports pain well controlled.  Voiding without issues.  Tolerating regular diet, ambulating  without dizziness, and bleeding is similar to a menses.  We reviewed s/sx of pp depression and normal pp bleeding.  Discussed routine 6 week postpartum visit.  She prefers to make this visit using [facility] [employer]hart.    Shira Griffiths MD  Date of Service (when I saw the patient): 10/08/22

## 2022-10-08 NOTE — PLAN OF CARE
VSS on RA. Independent. Fundus firm at 1 cm below umbilicus. Lochia scant. Voids spontaneously. Passing flatus, denies nausea. Cramping pain managed with Tylenol and Ibuprofen. Pt decided to do breast pumped milk and formula feeding per bottle. Spouse is supportive with cares. Possible discharge today.   Vitals:    10/06/22 2351 10/07/22 0757 10/07/22 1845 10/08/22 0219   BP: 112/78 115/79 121/80 108/76   BP Location: Right arm Left arm Left arm Left arm   Patient Position: Semi-Pérez's Semi-Pérez's Semi-Pérez's Supine   Cuff Size: Adult Regular Adult Regular Adult Regular Adult Regular   Pulse: 79 92 82 72   Resp: 18 18 16 18   Temp: 98.2  F (36.8  C) 98.7  F (37.1  C) 98.6  F (37  C) 97.7  F (36.5  C)   TempSrc: Oral Axillary Oral Oral   SpO2: 99%

## 2022-10-08 NOTE — PLAN OF CARE
Data: Vital signs within normal limits. Postpartum checks within normal limits - see flow record. Patient eating and drinking normally. Patient able to empty bladder independently and is up ambulating. No apparent signs of infection.  Perineum  healing well, not as swollen, minimal lochia. Patient performing self cares and is able to care for infant.  Difficulty breastfeeding this shift, mom started pumping and cup feeding colostrum back to baby.  Tried nipple shield.  No lactation today to see patient.  Action: Patient medicated during the shift for pain and cramping. See MAR. Patient reassessed within 1 hour after each medication and pain was improved - patient stated she was comfortable. Patient education done about different breastfeeding positions, starting nipple shield, hydrogels, discharge goals. See flow record.  Response: Positive attachment behaviors observed with infant. Support persons  present.   Plan: Anticipate discharge on tomorrow AM.  Goal Outcome Evaluation:

## 2022-10-11 ENCOUNTER — TELEPHONE (OUTPATIENT)
Dept: OBGYN | Facility: CLINIC | Age: 32
End: 2022-10-11

## 2022-10-11 NOTE — TELEPHONE ENCOUNTER
"Returned Chanel's call;  Baby is now 5 days old, and although she decided a few days ago to formula feed, she is now having \"second thoughts\" and has begun pumping again.   Reports that nursing did not go well in hospital, and she feels doubtful that it could ever be successful. Has appointment with pediatric provider in a few days to evaluate for possible tongue tie, but states that she would prefer not to directly breastfeed.  Feels that exclusive pumping would be too difficult now that she also has a toddler, but is wondering if it is possible to do partial pumping, giving formula for some feedings.  Fears that it may be \"too late\" to establish a milk supply as she has not removed milk from her breasts in 48 hours. She did pump this morning, however, and released 5 oz--felt very engorged.  She is using a Spectra pump with a 21mm flange and finds this comfortable. Did exclusive pumping x 3 months for first child and had ample milk supply.      A:  Mother desiring establishment of a partial milk supply, after not having pumped x 28 hours.  Does not want to directly breastfeed.    P:  Briefly advised that 5 days is very young, and that direct breastfeeding could certainly be successful for most people at that point, but Chanel prefers a combination of pumping and formula feeding. Given reassurance that it is not \"too late\" to begin milk removal, and discussed that as milk supply is a supply/demand system, her body will make the amount of milk that she removes.  Encouraged pumping as often as able, and advised that she can use formula for other feedings.     "

## 2022-10-11 NOTE — TELEPHONE ENCOUNTER
Pt had a bay 5 days ago and baby was latching ok then Mom decided she wanted to pump only then they did formula and now her milk has come in and she is engorged and wondering if it is too late to pump again since she hasn't in a couple of days?

## 2022-11-17 NOTE — PROGRESS NOTES
CC: postpartum visit    SUBJECTIVE:  Chanel Vázquez is a 32 year old female  here for a postpartum visit.  She had a  on 10/6/22 delivering a healthy baby boy weighing 6 lbs 13 oz at 39w1d.      PHQ-9 SCORE 2020   PHQ-9 Total Score 0     No flowsheet data found.      delivery complications:  No  breast feeding:  Yes, pumping and formula.  Doesn't have as much milk as when she had Vidal, but doesn't pump as much.  bladder problems:  No  bowel problems/hemorrhoids:  No  episiotomy/laceration/incision healed? No  vaginal flow:  None  [place]:  No  contraception:  Desires IUD  emotional adjustment:  doing well, happy and tired  back to work:  End of .  16w off.    Current Outpatient Medications   Medication     acetaminophen (TYLENOL) 325 MG tablet     ibuprofen (ADVIL/MOTRIN) 600 MG tablet     Prenatal Vit-Fe Fumarate-FA (PRENATAL MULTIVITAMIN W/IRON) 27-0.8 MG tablet     senna-docusate (SENOKOT-S/PERICOLACE) 8.6-50 MG tablet     No current facility-administered medications for this visit.     Facility-Administered Medications Ordered in Other Visits   Medication     fentaNYL (SUBLIMAZE) 2 mcg/mL, bupivacaine (MARCAINE) 0.125% in NaCl 0.9% EPIDURAL infusion     lidocaine-EPINEPHrine 1.5 %-[id number] injection       OBJECTIVE:  Blood pressure 123/76, pulse 82, weight 83 kg (183 lb), last menstrual period 2022, SpO2 98 %, currently breastfeeding.   General - pleasant female in no acute distress.  Abdomen - soft, nontender, nondistended, no hepatosplenomegaly.  Pelvic - EG: normal adult female, BUS: within normal limits, Vagina: well rugated, no discharge, Cervix: normal, no lesions or CMT, Uterus: firm, normal sized and nontender, Adnexae: no masses or tenderness.  Rectovaginal - deferred.    ASSESSMENT:  32 year old  with normal postpartum exam    PLAN:  May resume normal activities without restrictions  Pap smear was not done today.  Due 2024    The patient will use IUD for  birth control.  Will schedule.  Aware of need for two weeks of no unprotected sex prior to insertion.    Full counseling was provided, and all questions answered. Compliance is strongly emphasized.    Return to clinic in one year for an annual, when due for a pap smear or PRN.    Brenda Greer MD

## 2022-11-18 ENCOUNTER — PRENATAL OFFICE VISIT (OUTPATIENT)
Dept: OBGYN | Facility: CLINIC | Age: 32
End: 2022-11-18
Payer: COMMERCIAL

## 2022-11-18 VITALS
OXYGEN SATURATION: 98 % | SYSTOLIC BLOOD PRESSURE: 123 MMHG | HEART RATE: 82 BPM | WEIGHT: 183 LBS | DIASTOLIC BLOOD PRESSURE: 76 MMHG | BODY MASS INDEX: 30.45 KG/M2

## 2022-11-18 PROCEDURE — 99207 PR POST PARTUM EXAM: CPT | Performed by: OBSTETRICS & GYNECOLOGY

## 2022-12-19 ENCOUNTER — OFFICE VISIT (OUTPATIENT)
Dept: OBGYN | Facility: CLINIC | Age: 32
End: 2022-12-19
Payer: COMMERCIAL

## 2022-12-19 VITALS
OXYGEN SATURATION: 100 % | WEIGHT: 183 LBS | DIASTOLIC BLOOD PRESSURE: 62 MMHG | BODY MASS INDEX: 30.45 KG/M2 | SYSTOLIC BLOOD PRESSURE: 98 MMHG | HEART RATE: 67 BPM

## 2022-12-19 DIAGNOSIS — Z30.430 ENCOUNTER FOR IUD INSERTION: Primary | ICD-10-CM

## 2022-12-19 DIAGNOSIS — Z30.430 ENCOUNTER FOR INSERTION OF INTRAUTERINE CONTRACEPTIVE DEVICE: ICD-10-CM

## 2022-12-19 LAB — HCG UR QL: NEGATIVE

## 2022-12-19 PROCEDURE — 58300 INSERT INTRAUTERINE DEVICE: CPT | Performed by: OBSTETRICS & GYNECOLOGY

## 2022-12-19 PROCEDURE — 81025 URINE PREGNANCY TEST: CPT | Performed by: OBSTETRICS & GYNECOLOGY

## 2022-12-19 NOTE — PATIENT INSTRUCTIONS
After having an IUD placed it is normal to have spotting and cramping, you can take Ibuprofen or Tylenol according to the instructions on the bottle and use a heating pad to the lower abdomen as needed.     Please avoid placing anything in the vagina (tampons, intercourse, etc) for 72 hours. The progesterone IUD takes 7 days to be effective for pregnancy prevention so please use condoms for back up contraception if you have intercourse before the 7 day talita but the copper (ParaGard IUD) is effective the same day of placement.     Please call [phone] with any severe abdominal pain, heavy vaginal bleeding, fevers or foul smelling vaginal discharge.     You should check your IUD strings in 2 weeks by placing one or two fingers inside the vagina as high up as you can reach, you should be able to feel the IUD strings (which feel like fishing line), if you can't feel your strings or don't feel comfortable checking yourself you can call clinic to schedule an IUD check.

## 2022-12-19 NOTE — PROGRESS NOTES
PROCEDURE: IUD insertion  Patient has verbalized understanding of risks and benefits. She was counseled on risks of infection, bleeding, uterine perforation, cervical laceration, expulsion, overall risk of pregnancy 2 in 1000, if she does get pregnant that there is an increased risk of ectopic pregnancy. All questions answered. She has signed the consent form.    Urine pregnancy test was negative and patient denied unprotected intercourse within the last 2 weeks.      A regular Vincent speculum was placed in the vagina with good visualization of the cervix.  The cervix was then swabbed with a betadine x3.  Tenaculum was placed at the 12 o'clock position on the cervix and the uterus sounded to 7.5cm.  The Mirena  IUD was then placed in the usual fashion under sterile technique without difficulty.  Strings were clipped about 2-3 cm from the cervical os.  Tenaculum was removed and cervix was hemostatic. There were no complications. The patient tolerated the procedure well.    Bleeding pattern of this particular IUD was discussed with the patient. She is aware that the IUD will need to be removed in 8 years or PRN.  She is to return to clinic for her next annual or PRN.

## 2023-02-09 ENCOUNTER — MEDICAL CORRESPONDENCE (OUTPATIENT)
Dept: HEALTH INFORMATION MANAGEMENT | Facility: CLINIC | Age: 33
End: 2023-02-09

## 2023-05-01 ENCOUNTER — E-VISIT (OUTPATIENT)
Dept: FAMILY MEDICINE | Facility: OTHER | Age: 33
End: 2023-05-01
Payer: COMMERCIAL

## 2023-05-01 DIAGNOSIS — N61.0 MASTITIS: Primary | ICD-10-CM

## 2023-05-01 PROCEDURE — 99421 OL DIG E/M SVC 5-10 MIN: CPT | Performed by: PHYSICIAN ASSISTANT

## 2023-05-01 RX ORDER — DICLOXACILLIN SODIUM 500 MG
500 CAPSULE ORAL 4 TIMES DAILY
Qty: 28 CAPSULE | Refills: 0 | Status: SHIPPED | OUTPATIENT
Start: 2023-05-01 | End: 2023-05-08

## 2023-07-07 ASSESSMENT — ENCOUNTER SYMPTOMS
PALPITATIONS: 0
DIARRHEA: 0
ARTHRALGIAS: 0
CONSTIPATION: 0
WEAKNESS: 0
EYE PAIN: 0
FREQUENCY: 0
NAUSEA: 0
BREAST MASS: 0
CHILLS: 0
JOINT SWELLING: 0
HEADACHES: 0
HEMATOCHEZIA: 0
COUGH: 0
HEMATURIA: 0
PARESTHESIAS: 0
SORE THROAT: 0
DIZZINESS: 0
DYSURIA: 0
HEARTBURN: 0
FEVER: 0
NERVOUS/ANXIOUS: 0
ABDOMINAL PAIN: 0
MYALGIAS: 0

## 2023-07-14 ENCOUNTER — OFFICE VISIT (OUTPATIENT)
Dept: FAMILY MEDICINE | Facility: OTHER | Age: 33
End: 2023-07-14
Payer: COMMERCIAL

## 2023-07-14 VITALS
SYSTOLIC BLOOD PRESSURE: 102 MMHG | BODY MASS INDEX: 28.49 KG/M2 | RESPIRATION RATE: 16 BRPM | OXYGEN SATURATION: 97 % | HEIGHT: 65 IN | WEIGHT: 171 LBS | HEART RATE: 77 BPM | DIASTOLIC BLOOD PRESSURE: 72 MMHG | TEMPERATURE: 97.3 F

## 2023-07-14 DIAGNOSIS — Z13.1 SCREENING FOR DIABETES MELLITUS: ICD-10-CM

## 2023-07-14 DIAGNOSIS — Z11.1 SCREENING EXAMINATION FOR PULMONARY TUBERCULOSIS: ICD-10-CM

## 2023-07-14 DIAGNOSIS — Z00.00 ROUTINE GENERAL MEDICAL EXAMINATION AT A HEALTH CARE FACILITY: Primary | ICD-10-CM

## 2023-07-14 PROBLEM — O34.30 CERVICAL CERCLAGE SUTURE PRESENT: Status: RESOLVED | Noted: 2022-09-20 | Resolved: 2023-07-14

## 2023-07-14 PROBLEM — Z34.90 PREGNANCY: Status: RESOLVED | Noted: 2022-10-06 | Resolved: 2023-07-14

## 2023-07-14 PROBLEM — Z23 NEED FOR TDAP VACCINATION: Status: RESOLVED | Noted: 2022-02-24 | Resolved: 2023-07-14

## 2023-07-14 LAB
HBA1C MFR BLD: 5.5 % (ref 0–5.6)
HGB BLD-MCNC: 13.9 G/DL (ref 11.7–15.7)

## 2023-07-14 PROCEDURE — 99395 PREV VISIT EST AGE 18-39: CPT | Performed by: PHYSICIAN ASSISTANT

## 2023-07-14 PROCEDURE — 86481 TB AG RESPONSE T-CELL SUSP: CPT | Performed by: PHYSICIAN ASSISTANT

## 2023-07-14 PROCEDURE — 83036 HEMOGLOBIN GLYCOSYLATED A1C: CPT | Performed by: PHYSICIAN ASSISTANT

## 2023-07-14 PROCEDURE — 85018 HEMOGLOBIN: CPT | Performed by: PHYSICIAN ASSISTANT

## 2023-07-14 PROCEDURE — 36415 COLL VENOUS BLD VENIPUNCTURE: CPT | Performed by: PHYSICIAN ASSISTANT

## 2023-07-14 ASSESSMENT — ENCOUNTER SYMPTOMS
ABDOMINAL PAIN: 0
NAUSEA: 0
SORE THROAT: 0
HEMATOCHEZIA: 0
PARESTHESIAS: 0
HEADACHES: 0
FREQUENCY: 0
COUGH: 0
ARTHRALGIAS: 0
PALPITATIONS: 0
DYSURIA: 0
DIZZINESS: 0
BREAST MASS: 0
NERVOUS/ANXIOUS: 0
FEVER: 0
MYALGIAS: 0
HEMATURIA: 0
DIARRHEA: 0
EYE PAIN: 0
CHILLS: 0
WEAKNESS: 0
CONSTIPATION: 0
JOINT SWELLING: 0
HEARTBURN: 0

## 2023-07-14 ASSESSMENT — PAIN SCALES - GENERAL: PAINLEVEL: NO PAIN (0)

## 2023-07-14 NOTE — PROGRESS NOTES
SUBJECTIVE:   CC: Chanel is an 33 year old who presents for preventive health visit.       2023     9:35 AM   Additional Questions   Roomed by rob   Accompanied by alone         2023     9:35 AM   Patient Reported Additional Medications   Patient reports taking the following new medications none     Healthy Habits:     Getting at least 3 servings of Calcium per day:  NO    Bi-annual eye exam:  NO    Dental care twice a year:  Yes    Sleep apnea or symptoms of sleep apnea:  None    Diet:  Regular (no restrictions)    Frequency of exercise:  2-3 days/week    Duration of exercise:  30-45 minutes    Taking medications regularly:  Yes    Medication side effects:  Not applicable    Additional concerns today:  Yes    Forms    Today's PHQ-2 Score:       2023    10:34 PM   PHQ-2 (  [employer])   Q1: Little interest or pleasure in doing things 0   Q2: Feeling down, depressed or hopeless 1   PHQ-2 Score 1   Q1: Little interest or pleasure in doing things Not at all   Q2: Feeling down, depressed or hopeless Several days   PHQ-2 Score 1                       Social History     Tobacco Use     Smoking status: Former     Packs/day: 0.00     Years: 1.00     Pack years: 0.00     Types: Cigarettes     Start date: 2010     Quit date: 2011     Years since quittin.2     Smokeless tobacco: Never   Substance Use Topics     Alcohol use: Yes     Comment: Occasional             2023     1:55 PM   Alcohol Use   Prescreen: >3 drinks/day or >7 drinks/week? No     Reviewed orders with patient.  Reviewed health maintenance and updated orders accordingly - Yes  BP Readings from Last 3 Encounters:   23 102/72   22 98/62   22 123/76    Wt Readings from Last 3 Encounters:   23 77.6 kg (171 lb)   22 83 kg (183 lb)   22 83 kg (183 lb)                  Patient Active Problem List   Diagnosis     Short cervical length during pregnancy     Suspected shortening of cervix not found      History of cervical cerclage     History of  delivery     Past Surgical History:   Procedure Laterality Date     AS REPAIR CRUCIATE LIGAMENT,KNEE       CERCLAGE CERVICAL N/A 2020    Procedure: CERCLAGE, CERVIX, VAGINAL APPROACH;  Surgeon: Ronny Rosario MD;  Location: [facility]     CERCLAGE CERVICAL N/A 2022    Procedure: CERCLAGE, CERVIX, VAGINAL APPROACH;  Surgeon: Pricilla Ponce MD;  Location: [facility]     ELBOW SURGERY       ORTHOPEDIC SURGERY  2010    Left ACL repair, Left arm        Social History     Tobacco Use     Smoking status: Former     Packs/day: 0.00     Years: 1.00     Pack years: 0.00     Types: Cigarettes     Start date: 2010     Quit date: 2011     Years since quittin.2     Smokeless tobacco: Never   Substance Use Topics     Alcohol use: Yes     Comment: Occasional     Family History   Problem Relation Age of Onset     Depression Mother      Anxiety Disorder Mother      Obesity Mother         Hx of gastric bypass     Asthma Father      Rheumatoid Arthritis Father      Obesity Father      Depression Brother      Anxiety Disorder Brother      Hyperlipidemia Brother      Diabetes Maternal Grandmother      Diabetes Maternal Grandfather      Heart Disease Maternal Grandfather      Diabetes Paternal Grandmother      Rheumatoid Arthritis Paternal Grandfather      Other Cancer Paternal Grandfather         Pancreatic     Cancer Other         Paternal Grandfather     Glaucoma No family hx of      Macular Degeneration No family hx of          Current Outpatient Medications   Medication Sig Dispense Refill     levonorgestrel (MIRENA) 20 MCG/DAY IUD 1 each (20 mcg) by Intrauterine route once       No Known Allergies    Breast Cancer Screenin/7/2023     1:55 PM   Breast CA Risk Assessment (FHS-7)   Do you have a family history of breast, colon, or ovarian cancer? No / Unknown         Patient under 40 years of age: Routine Mammogram Screening not  "recommended.   Pertinent mammograms are reviewed under the imaging tab.    History of abnormal Pap smear: NO - age 30-65 PAP every 5 years with negative HPV co-testing recommended      6/17/2021     8:39 AM 12/3/2018     1:08 PM   PAP / HPV   PAP (Historical) NIL  NIL      Reviewed and updated as needed this visit by clinical staff   Tobacco  Allergies  Meds              Reviewed and updated as needed this visit by Provider                     Review of Systems   Constitutional: Negative for chills and fever.   HENT: Negative for congestion, ear pain, hearing loss and sore throat.    Eyes: Negative for pain and visual disturbance.   Respiratory: Negative for cough.    Cardiovascular: Negative for chest pain, palpitations and peripheral edema.   Gastrointestinal: Negative for abdominal pain, constipation, diarrhea, heartburn, hematochezia and nausea.   Breasts:  Negative for tenderness, breast mass and discharge.   Genitourinary: Negative for dysuria, frequency, genital sores, hematuria, pelvic pain, urgency, vaginal bleeding and vaginal discharge.   Musculoskeletal: Negative for arthralgias, joint swelling and myalgias.   Skin: Negative for rash.   Neurological: Negative for dizziness, weakness, headaches and paresthesias.   Psychiatric/Behavioral: Negative for mood changes. The patient is not nervous/anxious.           OBJECTIVE:   /72   Pulse 77   Temp 97.3  F (36.3  C) (Temporal)   Resp 16   Ht 1.66 m (5' 5.35\")   Wt 77.6 kg (171 lb)   LMP  (LMP Unknown)   SpO2 97%   Breastfeeding No   BMI 28.15 kg/m    Physical Exam  GENERAL: healthy, alert and no distress  EYES: Eyes grossly normal to inspection, PERRL and conjunctivae and sclerae normal  HENT: ear canals and TM's normal, nose and mouth without ulcers or lesions  NECK: no adenopathy, no asymmetry, masses, or scars and thyroid normal to palpation  RESP: lungs clear to auscultation - no rales, rhonchi or wheezes  BREAST: normal without masses, " tenderness or nipple discharge and no palpable axillary masses or adenopathy  CV: regular rate and rhythm, normal S1 S2, no S3 or S4, no murmur, click or rub, no peripheral edema and peripheral pulses strong  ABDOMEN: soft, nontender, no hepatosplenomegaly, no masses and bowel sounds normal  MS: no gross musculoskeletal defects noted, no edema  SKIN: no suspicious lesions or rashes  NEURO: Normal strength and tone, mentation intact and speech normal  PSYCH: mentation appears normal, affect normal/bright    Diagnostic Test Results:  Labs reviewed in Epic  Results for orders placed or performed in visit on 07/14/23 (from the past 24 hour(s))   Hemoglobin   Result Value Ref Range    Hemoglobin 13.9 11.7 - 15.7 g/dL   Quantiferon TB Gold Plus    Specimen: Peripheral Blood    Narrative    The following orders were created for panel order Quantiferon TB Gold Plus.  Procedure                               Abnormality         Status                     ---------                               -----------         ------                     Quantiferon TB Gold Plus...[[id number]]                      In process                 Quantiferon TB Gold Plus...[[id number]]                      In process                 Quantiferon TB Gold Plus...[[id number]]                      In process                 Quantiferon TB Gold Plus...[[id number]]                      In process                   Please view results for these tests on the individual orders.       ASSESSMENT/PLAN:       ICD-10-CM    1. Routine general medical examination at a health care facility  Z00.00       2. Screening examination for pulmonary tuberculosis  Z11.1 Quantiferon TB Gold Plus     Quantiferon TB Gold Plus      3. Screening for diabetes mellitus  Z13.1 Hemoglobin A1c     Hemoglobin     Hemoglobin     Hemoglobin A1c        Forms for school completed. Waiting on Tb screening.   Had elevated glucose in pregnancy, rechecking risk.   Hemoglobin normalized.  "      COUNSELING:  Reviewed preventive health counseling, as reflected in patient instructions      BMI:   Estimated body mass index is 28.15 kg/m  as calculated from the following:    Height as of this encounter: 1.66 m (5' 5.35\").    Weight as of this encounter: 77.6 kg (171 lb).       She reports that she quit smoking about 12 years ago. Her smoking use included cigarettes. She started smoking about 13 years ago. She has never used smokeless tobacco.      Sienna Polanco PA-C  [facility]  "

## 2023-07-16 LAB
QUANTIFERON MITOGEN: 10 IU/ML
QUANTIFERON NIL TUBE: 0.03 IU/ML

## 2023-07-17 ENCOUNTER — HOSPITAL ENCOUNTER (OUTPATIENT)
Dept: GENERAL RADIOLOGY | Facility: CLINIC | Age: 33
Discharge: HOME | End: 2023-07-17
Attending: PHYSICIAN ASSISTANT | Admitting: PHYSICIAN ASSISTANT
Payer: COMMERCIAL

## 2023-07-17 DIAGNOSIS — R76.12 POSITIVE QUANTIFERON-TB GOLD TEST: ICD-10-CM

## 2023-07-17 DIAGNOSIS — R76.12 POSITIVE QUANTIFERON-TB GOLD TEST: Primary | ICD-10-CM

## 2023-07-17 LAB
GAMMA INTERFERON BACKGROUND BLD IA-ACNC: 0.03 IU/ML
M TB IFN-G BLD-IMP: POSITIVE
M TB IFN-G CD4+ BCKGRND COR BLD-ACNC: 9.97 IU/ML
MITOGEN IGNF BCKGRD COR BLD-ACNC: 0.45 IU/ML
MITOGEN IGNF BCKGRD COR BLD-ACNC: 0.5 IU/ML
QUANTIFERON TB1 TUBE: 0.53 IU/ML
QUANTIFERON TB2 TUBE: 0.48

## 2023-07-17 PROCEDURE — 71046 X-RAY EXAM CHEST 2 VIEWS: CPT | Mod: 26 | Performed by: RADIOLOGY

## 2023-07-17 PROCEDURE — 71046 X-RAY EXAM CHEST 2 VIEWS: CPT

## 2023-07-18 ENCOUNTER — LAB (OUTPATIENT)
Dept: LAB | Facility: CLINIC | Age: 33
End: 2023-07-18
Payer: COMMERCIAL

## 2023-07-18 DIAGNOSIS — R76.12 POSITIVE QUANTIFERON-TB GOLD TEST: ICD-10-CM

## 2023-07-18 PROCEDURE — 36415 COLL VENOUS BLD VENIPUNCTURE: CPT

## 2023-07-18 PROCEDURE — 86481 TB AG RESPONSE T-CELL SUSP: CPT

## 2023-07-20 LAB
QUANTIFERON MITOGEN: 10 IU/ML
QUANTIFERON NIL TUBE: 0.01 IU/ML
QUANTIFERON TB1 TUBE: 0.32 IU/ML
QUANTIFERON TB2 TUBE: 0.24

## 2023-07-21 LAB
GAMMA INTERFERON BACKGROUND BLD IA-ACNC: 0.01 IU/ML
M TB IFN-G BLD-IMP: NEGATIVE
M TB IFN-G CD4+ BCKGRND COR BLD-ACNC: 9.99 IU/ML
MITOGEN IGNF BCKGRD COR BLD-ACNC: 0.23 IU/ML
MITOGEN IGNF BCKGRD COR BLD-ACNC: 0.31 IU/ML

## 2023-11-08 ENCOUNTER — MYC MEDICAL ADVICE (OUTPATIENT)
Dept: FAMILY MEDICINE | Facility: OTHER | Age: 33
End: 2023-11-08
Payer: COMMERCIAL

## 2023-11-08 NOTE — TELEPHONE ENCOUNTER
The patient had a IUD placed on 12/19/22. She currently has a Mirena IUD.     Michele Perez, MSN, RN, PHN  [place]  November 8, 2023

## 2023-11-24 ENCOUNTER — OFFICE VISIT (OUTPATIENT)
Dept: URGENT CARE | Facility: URGENT CARE | Age: 33
End: 2023-11-24
Payer: COMMERCIAL

## 2023-11-24 VITALS
OXYGEN SATURATION: 99 % | RESPIRATION RATE: 16 BRPM | SYSTOLIC BLOOD PRESSURE: 122 MMHG | TEMPERATURE: 97.7 F | DIASTOLIC BLOOD PRESSURE: 80 MMHG | HEART RATE: 70 BPM

## 2023-11-24 DIAGNOSIS — R07.0 THROAT PAIN: Primary | ICD-10-CM

## 2023-11-24 LAB
BASOPHILS # BLD AUTO: 0 10E3/UL (ref 0–0.2)
BASOPHILS NFR BLD AUTO: 0 %
DEPRECATED S PYO AG THROAT QL EIA: NEGATIVE
EOSINOPHIL # BLD AUTO: 0.2 10E3/UL (ref 0–0.7)
EOSINOPHIL NFR BLD AUTO: 2 %
ERYTHROCYTE [DISTWIDTH] IN BLOOD BY AUTOMATED COUNT: 11.7 % (ref 10–15)
FLUAV AG SPEC QL IA: NEGATIVE
FLUBV AG SPEC QL IA: NEGATIVE
HCT VFR BLD AUTO: 40.4 % (ref 35–47)
HGB BLD-MCNC: 13.5 G/DL (ref 11.7–15.7)
IMM GRANULOCYTES # BLD: 0 10E3/UL
IMM GRANULOCYTES NFR BLD: 0 %
LYMPHOCYTES # BLD AUTO: 2.6 10E3/UL (ref 0.8–5.3)
LYMPHOCYTES NFR BLD AUTO: 30 %
MCH RBC QN AUTO: 30.6 PG (ref 26.5–33)
MCHC RBC AUTO-ENTMCNC: 33.4 G/DL (ref 31.5–36.5)
MCV RBC AUTO: 92 FL (ref 78–100)
MONOCYTES # BLD AUTO: 0.7 10E3/UL (ref 0–1.3)
MONOCYTES NFR BLD AUTO: 8 %
MONOCYTES NFR BLD AUTO: NEGATIVE %
NEUTROPHILS # BLD AUTO: 5.1 10E3/UL (ref 1.6–8.3)
NEUTROPHILS NFR BLD AUTO: 60 %
PLATELET # BLD AUTO: 196 10E3/UL (ref 150–450)
RBC # BLD AUTO: 4.41 10E6/UL (ref 3.8–5.2)
WBC # BLD AUTO: 8.6 10E3/UL (ref 4–11)

## 2023-11-24 PROCEDURE — 87651 STREP A DNA AMP PROBE: CPT | Performed by: PHYSICIAN ASSISTANT

## 2023-11-24 PROCEDURE — 85025 COMPLETE CBC W/AUTO DIFF WBC: CPT | Performed by: PHYSICIAN ASSISTANT

## 2023-11-24 PROCEDURE — 87804 INFLUENZA ASSAY W/OPTIC: CPT | Performed by: PHYSICIAN ASSISTANT

## 2023-11-24 PROCEDURE — 87635 SARS-COV-2 COVID-19 AMP PRB: CPT | Performed by: PHYSICIAN ASSISTANT

## 2023-11-24 PROCEDURE — 86308 HETEROPHILE ANTIBODY SCREEN: CPT | Performed by: PHYSICIAN ASSISTANT

## 2023-11-24 PROCEDURE — 36415 COLL VENOUS BLD VENIPUNCTURE: CPT | Performed by: PHYSICIAN ASSISTANT

## 2023-11-24 PROCEDURE — 99213 OFFICE O/P EST LOW 20 MIN: CPT | Performed by: PHYSICIAN ASSISTANT

## 2023-11-24 ASSESSMENT — ENCOUNTER SYMPTOMS
FEVER: 0
DIAPHORESIS: 0
CHILLS: 0

## 2023-11-24 NOTE — PROGRESS NOTES
SUBJECTIVE:   Chanel Vázquez is a 33 year old female presenting with a chief complaint of   Chief Complaint   Patient presents with    Urgent Care     Present fot sore throat, runny stuffy nose and cough for a few days.        She is an established patient of [place].  Two weeks of cold symptoms, White exudates on tonsils today , congestion, body aches, no fever   Had strep in September   Ibuprofen,   No meds or condition          Review of Systems   Constitutional:  Negative for chills, diaphoresis and fever.       Past Medical History:   Diagnosis Date    Varicella      Family History   Problem Relation Age of Onset    Depression Mother     Anxiety Disorder Mother     Obesity Mother         Hx of gastric bypass    Asthma Father     Rheumatoid Arthritis Father     Obesity Father     Depression Brother     Anxiety Disorder Brother     Hyperlipidemia Brother     Diabetes Maternal Grandmother     Diabetes Maternal Grandfather     Heart Disease Maternal Grandfather     Diabetes Paternal Grandmother     Rheumatoid Arthritis Paternal Grandfather     Other Cancer Paternal Grandfather         Pancreatic    Cancer Other         Paternal Grandfather    Glaucoma No family hx of     Macular Degeneration No family hx of      Current Outpatient Medications   Medication Sig Dispense Refill    levonorgestrel (MIRENA) 20 MCG/DAY IUD 1 each (20 mcg) by Intrauterine route once       Social History     Tobacco Use    Smoking status: Former     Packs/day: 0.00     Years: 1.00     Additional pack years: 0.00     Total pack years: 0.00     Types: Cigarettes     Start date: 2010     Quit date: 2011     Years since quittin.5    Smokeless tobacco: Never   Substance Use Topics    Alcohol use: Yes     Comment: Occasional       OBJECTIVE  /80   Pulse 70   Temp 97.7  F (36.5  C) (Oral)   Resp 16   SpO2 99%     Physical Exam  Vitals and nursing note reviewed.   Constitutional:       General: She is not in acute  distress.     Appearance: Normal appearance. She is normal weight. She is not ill-appearing.   HENT:      Head: Normocephalic and atraumatic.      Right Ear: Tympanic membrane, ear canal and external ear normal.      Left Ear: Tympanic membrane, ear canal and external ear normal.      Nose: Nose normal.      Mouth/Throat:      Mouth: Mucous membranes are moist.      Pharynx: Oropharyngeal exudate present.   Eyes:      Extraocular Movements: Extraocular movements intact.      Conjunctiva/sclera: Conjunctivae normal.   Cardiovascular:      Rate and Rhythm: Normal rate and regular rhythm.      Pulses: Normal pulses.      Heart sounds: Normal heart sounds.   Pulmonary:      Effort: Pulmonary effort is normal.      Breath sounds: Normal breath sounds.   Musculoskeletal:      Cervical back: Normal range of motion.   Lymphadenopathy:      Cervical: Cervical adenopathy present.   Skin:     General: Skin is warm and dry.      Findings: No rash.   Neurological:      General: No focal deficit present.      Mental Status: She is alert.   Psychiatric:         Mood and Affect: Mood normal.         Behavior: Behavior normal.         Labs:  Results for orders placed or performed in visit on 11/24/23 (from the past 24 hour(s))   Influenza A & B Antigen    Specimen: Nose; Swab   Result Value Ref Range    Influenza A antigen Negative Negative    Influenza B antigen Negative Negative    Narrative    Test results must be correlated with clinical data. If necessary, results should be confirmed by a molecular assay or viral culture.   Streptococcus A Rapid Screen w/Reflex to PCR    Specimen: Throat; Swab   Result Value Ref Range    Group A Strep antigen Negative Negative   CBC with platelets and differential    Narrative    The following orders were created for panel order CBC with platelets and differential.  Procedure                               Abnormality         Status                     ---------                                -----------         ------                     CBC with platelets and d...[[id number]]                      Final result                 Please view results for these tests on the individual orders.   Mononucleosis screen   Result Value Ref Range    Mononucleosis Screen Negative Negative   CBC with platelets and differential   Result Value Ref Range    WBC Count 8.6 4.0 - 11.0 10e3/uL    RBC Count 4.41 3.80 - 5.20 10e6/uL    Hemoglobin 13.5 11.7 - 15.7 g/dL    Hematocrit 40.4 35.0 - 47.0 %    MCV 92 78 - 100 fL    MCH 30.6 26.5 - 33.0 pg    MCHC 33.4 31.5 - 36.5 g/dL    RDW 11.7 10.0 - 15.0 %    Platelet Count 196 150 - 450 10e3/uL    % Neutrophils 60 %    % Lymphocytes 30 %    % Monocytes 8 %    % Eosinophils 2 %    % Basophils 0 %    % Immature Granulocytes 0 %    Absolute Neutrophils 5.1 1.6 - 8.3 10e3/uL    Absolute Lymphocytes 2.6 0.8 - 5.3 10e3/uL    Absolute Monocytes 0.7 0.0 - 1.3 10e3/uL    Absolute Eosinophils 0.2 0.0 - 0.7 10e3/uL    Absolute Basophils 0.0 0.0 - 0.2 10e3/uL    Absolute Immature Granulocytes 0.0 <=0.4 10e3/uL         ASSESSMENT:      ICD-10-CM    1. Throat pain  R07.0 Influenza A & B Antigen     Symptomatic COVID-19 Virus (Coronavirus) by PCR Nose     Streptococcus A Rapid Screen w/Reflex to PCR     Group A Streptococcus PCR Throat Swab     CBC with platelets and differential     Mononucleosis screen     CBC with platelets and differential     Mononucleosis screen           Medical Decision Making:    Differential Diagnosis:  URI Adult/Peds:  Influenza, Mononucleosis, Strep pharyngitis, Viral pharyngitis, and Viral upper respiratory illness    Serious Comorbid Conditions:  Adult:   reviewed    PLAN:    Tylenol/motrin as needed.  Drink plenty of water.  Gargle with salt water.  May use chloraseptic spray as directed for ST.  Strep pcr pending.     Discussed and recommended CDC guidelines for self isolation.  COVID test pending.        Followup:    If not improving or if condition worsens,  follow up with your Primary Care Provider, If not improving or if conditions worsens over the next 12-24 hours, go to the Emergency Department    There are no Patient Instructions on file for this visit.

## 2023-11-25 LAB — GROUP A STREP BY PCR: NOT DETECTED

## 2023-11-26 LAB — SARS-COV-2 RNA RESP QL NAA+PROBE: NEGATIVE

## 2023-12-05 ENCOUNTER — E-VISIT (OUTPATIENT)
Dept: FAMILY MEDICINE | Facility: OTHER | Age: 33
End: 2023-12-05
Payer: COMMERCIAL

## 2023-12-05 DIAGNOSIS — J01.90 ACUTE NON-RECURRENT SINUSITIS, UNSPECIFIED LOCATION: Primary | ICD-10-CM

## 2023-12-05 PROCEDURE — 99421 OL DIG E/M SVC 5-10 MIN: CPT | Performed by: PHYSICIAN ASSISTANT

## 2023-12-21 ENCOUNTER — E-VISIT (OUTPATIENT)
Dept: FAMILY MEDICINE | Facility: OTHER | Age: 33
End: 2023-12-21
Payer: COMMERCIAL

## 2023-12-21 ENCOUNTER — LAB (OUTPATIENT)
Dept: LAB | Facility: CLINIC | Age: 33
End: 2023-12-21
Payer: COMMERCIAL

## 2023-12-21 DIAGNOSIS — N89.8 VAGINAL DISCHARGE: Primary | ICD-10-CM

## 2023-12-21 DIAGNOSIS — N94.9 VAGINAL DISCOMFORT: ICD-10-CM

## 2023-12-21 DIAGNOSIS — B37.31 CANDIDIASIS OF VULVA AND VAGINA: ICD-10-CM

## 2023-12-21 DIAGNOSIS — N89.8 VAGINAL DISCHARGE: ICD-10-CM

## 2023-12-21 LAB
CLUE CELLS: ABNORMAL
TRICHOMONAS, WET PREP: ABNORMAL
WBC'S/HIGH POWER FIELD, WET PREP: ABNORMAL
YEAST, WET PREP: PRESENT

## 2023-12-21 PROCEDURE — 99421 OL DIG E/M SVC 5-10 MIN: CPT | Performed by: FAMILY MEDICINE

## 2023-12-21 PROCEDURE — 87210 SMEAR WET MOUNT SALINE/INK: CPT

## 2023-12-21 RX ORDER — TERCONAZOLE 8 MG/G
1 CREAM VAGINAL AT BEDTIME
Qty: 15 G | Refills: 0 | Status: SHIPPED | OUTPATIENT
Start: 2023-12-21 | End: 2023-12-24

## 2023-12-21 NOTE — RESULT ENCOUNTER NOTE
Chanel,    You do have evidence of a yeast infection.  Would you prefer an over-the-counter cream or a prescription pill for this?    Thanks,    Dr. Ponce

## 2024-01-24 ENCOUNTER — LAB (OUTPATIENT)
Dept: LAB | Facility: CLINIC | Age: 34
End: 2024-01-24
Payer: COMMERCIAL

## 2024-01-24 ENCOUNTER — E-VISIT (OUTPATIENT)
Dept: FAMILY MEDICINE | Facility: OTHER | Age: 34
End: 2024-01-24
Payer: COMMERCIAL

## 2024-01-24 DIAGNOSIS — N89.8 VAGINAL DISCHARGE: Primary | ICD-10-CM

## 2024-01-24 DIAGNOSIS — N89.8 VAGINAL DISCHARGE: ICD-10-CM

## 2024-01-24 DIAGNOSIS — B37.31 CANDIDIASIS OF VAGINA: ICD-10-CM

## 2024-01-24 DIAGNOSIS — B96.89 BACTERIAL VAGINOSIS: ICD-10-CM

## 2024-01-24 DIAGNOSIS — N76.0 BACTERIAL VAGINOSIS: ICD-10-CM

## 2024-01-24 PROCEDURE — 87210 SMEAR WET MOUNT SALINE/INK: CPT

## 2024-01-24 PROCEDURE — 99421 OL DIG E/M SVC 5-10 MIN: CPT | Performed by: PHYSICIAN ASSISTANT

## 2024-01-24 RX ORDER — METRONIDAZOLE 500 MG/1
500 TABLET ORAL 2 TIMES DAILY
Qty: 14 TABLET | Refills: 0 | Status: SHIPPED | OUTPATIENT
Start: 2024-01-24 | End: 2024-01-31

## 2024-01-24 RX ORDER — FLUCONAZOLE 150 MG/1
150 TABLET ORAL DAILY
Qty: 2 TABLET | Refills: 0 | Status: SHIPPED | OUTPATIENT
Start: 2024-01-24 | End: 2024-07-17

## 2024-02-01 ENCOUNTER — LAB (OUTPATIENT)
Dept: LAB | Facility: OTHER | Age: 34
End: 2024-02-01
Payer: COMMERCIAL

## 2024-02-01 DIAGNOSIS — N76.0 BACTERIAL VAGINOSIS: ICD-10-CM

## 2024-02-01 DIAGNOSIS — B96.89 BACTERIAL VAGINOSIS: ICD-10-CM

## 2024-02-01 DIAGNOSIS — B37.31 CANDIDIASIS OF VAGINA: ICD-10-CM

## 2024-02-01 LAB
CLUE CELLS: ABNORMAL
TRICHOMONAS, WET PREP: ABNORMAL
WBC'S/HIGH POWER FIELD, WET PREP: ABNORMAL
YEAST, WET PREP: ABNORMAL

## 2024-02-01 PROCEDURE — 87210 SMEAR WET MOUNT SALINE/INK: CPT

## 2024-06-04 ENCOUNTER — OFFICE VISIT (OUTPATIENT)
Dept: OPTOMETRY | Facility: CLINIC | Age: 34
End: 2024-06-04
Payer: COMMERCIAL

## 2024-06-04 DIAGNOSIS — H52.223 REGULAR ASTIGMATISM OF BOTH EYES: ICD-10-CM

## 2024-06-04 DIAGNOSIS — H52.13 MYOPIA OF BOTH EYES: ICD-10-CM

## 2024-06-04 DIAGNOSIS — Z01.00 ROUTINE EYE EXAM: Primary | ICD-10-CM

## 2024-06-04 PROCEDURE — 92004 COMPRE OPH EXAM NEW PT 1/>: CPT | Performed by: OPTOMETRIST

## 2024-06-04 PROCEDURE — 92015 DETERMINE REFRACTIVE STATE: CPT | Performed by: OPTOMETRIST

## 2024-06-04 ASSESSMENT — REFRACTION_WEARINGRX
SPECS_TYPE: SVL
OS_CYLINDER: +0.50
OS_SPHERE: -1.25
OD_SPHERE: -1.00
OD_AXIS: 160
OD_CYLINDER: +0.25
OS_AXIS: 015

## 2024-06-04 ASSESSMENT — REFRACTION_MANIFEST
OD_SPHERE: -1.00
OS_AXIS: 015
OD_AXIS: 155
OD_SPHERE: -1.00
METHOD_AUTOREFRACTION: 1
OD_CYLINDER: +0.50
OS_CYLINDER: +0.75
OS_AXIS: 012
OS_SPHERE: -1.25
OD_AXIS: 160
OS_SPHERE: -1.00
OD_CYLINDER: +0.50
OS_CYLINDER: +0.75

## 2024-06-04 ASSESSMENT — TONOMETRY
OS_IOP_MMHG: 18
IOP_METHOD: APPLANATION
OD_IOP_MMHG: 18

## 2024-06-04 ASSESSMENT — VISUAL ACUITY
OS_SC: 20/20
OS_CC: 20/20
CORRECTION_TYPE: GLASSES
OD_CC: 20/20
OD_SC: 20/20
METHOD: SNELLEN - LINEAR

## 2024-06-04 ASSESSMENT — KERATOMETRY
OD_AXISANGLE2_DEGREES: 166
OD_K2POWER_DIOPTERS: 41.75
OS_K2POWER_DIOPTERS: 41.75
OS_AXISANGLE2_DEGREES: 180
OS_K1POWER_DIOPTERS: 41.75
OD_K1POWER_DIOPTERS: 42.00

## 2024-06-04 ASSESSMENT — CUP TO DISC RATIO
OD_RATIO: 0.4
OS_RATIO: 0.3

## 2024-06-04 ASSESSMENT — CONF VISUAL FIELD
OD_INFERIOR_NASAL_RESTRICTION: 0
OS_SUPERIOR_TEMPORAL_RESTRICTION: 0
METHOD: COUNTING FINGERS
OS_NORMAL: 1
OD_SUPERIOR_TEMPORAL_RESTRICTION: 0
OS_INFERIOR_TEMPORAL_RESTRICTION: 0
OD_NORMAL: 1
OS_SUPERIOR_NASAL_RESTRICTION: 0
OD_INFERIOR_TEMPORAL_RESTRICTION: 0
OS_INFERIOR_NASAL_RESTRICTION: 0
OD_SUPERIOR_NASAL_RESTRICTION: 0

## 2024-06-04 ASSESSMENT — EXTERNAL EXAM - RIGHT EYE: OD_EXAM: NORMAL

## 2024-06-04 ASSESSMENT — SLIT LAMP EXAM - LIDS
COMMENTS: NORMAL
COMMENTS: NORMAL

## 2024-06-04 ASSESSMENT — EXTERNAL EXAM - LEFT EYE: OS_EXAM: NORMAL

## 2024-06-04 NOTE — LETTER
6/4/2024         RE: Chanel Vázquez  [address] [place] [address]        Dear Colleague,    Thank you for referring your patient, Chanel Vázquez, to the [facility]. Please see a copy of my visit note below.    Chief Complaint   Patient presents with     COMPREHENSIVE EYE EXAM         Last Eye Exam: 4/21/21  Dilated Previously: Yes    What are you currently using to see?  Glasses, per patient doesn't wear often        Distance Vision Acuity: Noticed gradual change in both eyes, notices that she sees better with the glasses     Near Vision Acuity: Satisfied with vision while reading and using computer unaided    Eye Comfort: good  Do you use eye drops? 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       No history of eye surgery  No family history of glaucoma or macular degeneration      OBJECTIVE: See Ophthalmology exam    ASSESSMENT:    ICD-10-CM    1. Routine eye exam  Z01.00       2. Regular astigmatism of both eyes  H52.223       3. Myopia of both eyes  H52.13           PLAN:     Patient Instructions   Fill glasses prescription  Allow 2 weeks to adapt to change in glasses  Return in 1 year for eye exam    Sherly Childs O.D.  [facility]  [address], [place] [id number] [phone]        Again, thank you for allowing me to participate in the care of your patient.        Sincerely,        Sherly Childs, OD

## 2024-06-04 NOTE — PROGRESS NOTES
Chief Complaint   Patient presents with    COMPREHENSIVE EYE EXAM         Last Eye Exam: 4/21/21  Dilated Previously: Yes    What are you currently using to see?  Glasses, per patient doesn't wear often        Distance Vision Acuity: Noticed gradual change in both eyes, notices that she sees better with the glasses     Near Vision Acuity: Satisfied with vision while reading and using computer unaided    Eye Comfort: good  Do you use eye drops? 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       No history of eye surgery  No family history of glaucoma or macular degeneration      OBJECTIVE: See Ophthalmology exam    ASSESSMENT:    ICD-10-CM    1. Routine eye exam  Z01.00       2. Regular astigmatism of both eyes  H52.223       3. Myopia of both eyes  H52.13           PLAN:     Patient Instructions   Fill glasses prescription  Allow 2 weeks to adapt to change in glasses  Return in 1 year for eye exam    Sherly Childs O.D.  [facility]  [address] [place] [address]  [phone]

## 2024-06-04 NOTE — PATIENT INSTRUCTIONS
Fill glasses prescription  Allow 2 weeks to adapt to change in glasses  Return in 1 year for eye exam    Sherly Childs O.D.  [facility]  [address] [place] [address] [phone]

## 2024-07-16 SDOH — HEALTH STABILITY: PHYSICAL HEALTH: ON AVERAGE, HOW MANY MINUTES DO YOU ENGAGE IN EXERCISE AT THIS LEVEL?: 30 MIN

## 2024-07-16 SDOH — HEALTH STABILITY: PHYSICAL HEALTH: ON AVERAGE, HOW MANY DAYS PER WEEK DO YOU ENGAGE IN MODERATE TO STRENUOUS EXERCISE (LIKE A BRISK WALK)?: 4 DAYS

## 2024-07-16 ASSESSMENT — SOCIAL DETERMINANTS OF HEALTH (SDOH): HOW OFTEN DO YOU GET TOGETHER WITH FRIENDS OR RELATIVES?: ONCE A WEEK

## 2024-07-17 ENCOUNTER — OFFICE VISIT (OUTPATIENT)
Dept: FAMILY MEDICINE | Facility: OTHER | Age: 34
End: 2024-07-17
Payer: COMMERCIAL

## 2024-07-17 VITALS
HEIGHT: 65 IN | WEIGHT: 185.5 LBS | RESPIRATION RATE: 17 BRPM | SYSTOLIC BLOOD PRESSURE: 104 MMHG | BODY MASS INDEX: 30.91 KG/M2 | OXYGEN SATURATION: 98 % | DIASTOLIC BLOOD PRESSURE: 80 MMHG | HEART RATE: 70 BPM | TEMPERATURE: 98.4 F

## 2024-07-17 DIAGNOSIS — Z13.220 SCREENING FOR HYPERLIPIDEMIA: ICD-10-CM

## 2024-07-17 DIAGNOSIS — H61.23 BILATERAL IMPACTED CERUMEN: ICD-10-CM

## 2024-07-17 DIAGNOSIS — Z00.00 ROUTINE GENERAL MEDICAL EXAMINATION AT A HEALTH CARE FACILITY: Primary | ICD-10-CM

## 2024-07-17 DIAGNOSIS — E66.811 CLASS 1 OBESITY WITHOUT SERIOUS COMORBIDITY WITH BODY MASS INDEX (BMI) OF 30.0 TO 30.9 IN ADULT, UNSPECIFIED OBESITY TYPE: ICD-10-CM

## 2024-07-17 DIAGNOSIS — Z13.1 SCREENING FOR DIABETES MELLITUS: ICD-10-CM

## 2024-07-17 DIAGNOSIS — Z12.4 CERVICAL CANCER SCREENING: ICD-10-CM

## 2024-07-17 LAB
CHOLEST SERPL-MCNC: 150 MG/DL
CORTIS SERPL-MCNC: 9.4 UG/DL
FASTING STATUS PATIENT QL REPORTED: YES
FASTING STATUS PATIENT QL REPORTED: YES
GLUCOSE SERPL-MCNC: 90 MG/DL (ref 70–99)
HBA1C MFR BLD: 5.4 % (ref 0–5.6)
HDLC SERPL-MCNC: 39 MG/DL
INSULIN SERPL-ACNC: 11.1 UU/ML (ref 2.6–24.9)
LDLC SERPL CALC-MCNC: 86 MG/DL
NONHDLC SERPL-MCNC: 111 MG/DL
T3 SERPL-MCNC: 106 NG/DL (ref 85–202)
T4 FREE SERPL-MCNC: 1.07 NG/DL (ref 0.9–1.7)
TRIGL SERPL-MCNC: 124 MG/DL
TSH SERPL DL<=0.005 MIU/L-ACNC: 1.98 UIU/ML (ref 0.3–4.2)

## 2024-07-17 PROCEDURE — 84480 ASSAY TRIIODOTHYRONINE (T3): CPT | Performed by: PHYSICIAN ASSISTANT

## 2024-07-17 PROCEDURE — 99395 PREV VISIT EST AGE 18-39: CPT | Mod: 25 | Performed by: PHYSICIAN ASSISTANT

## 2024-07-17 PROCEDURE — G0145 SCR C/V CYTO,THINLAYER,RESCR: HCPCS | Performed by: PHYSICIAN ASSISTANT

## 2024-07-17 PROCEDURE — 83525 ASSAY OF INSULIN: CPT | Performed by: PHYSICIAN ASSISTANT

## 2024-07-17 PROCEDURE — 84439 ASSAY OF FREE THYROXINE: CPT | Performed by: PHYSICIAN ASSISTANT

## 2024-07-17 PROCEDURE — 83036 HEMOGLOBIN GLYCOSYLATED A1C: CPT | Performed by: PHYSICIAN ASSISTANT

## 2024-07-17 PROCEDURE — 36415 COLL VENOUS BLD VENIPUNCTURE: CPT | Performed by: PHYSICIAN ASSISTANT

## 2024-07-17 PROCEDURE — 82947 ASSAY GLUCOSE BLOOD QUANT: CPT | Performed by: PHYSICIAN ASSISTANT

## 2024-07-17 PROCEDURE — 82533 TOTAL CORTISOL: CPT | Performed by: PHYSICIAN ASSISTANT

## 2024-07-17 PROCEDURE — 84443 ASSAY THYROID STIM HORMONE: CPT | Performed by: PHYSICIAN ASSISTANT

## 2024-07-17 PROCEDURE — 87624 HPV HI-RISK TYP POOLED RSLT: CPT | Performed by: PHYSICIAN ASSISTANT

## 2024-07-17 PROCEDURE — 69210 REMOVE IMPACTED EAR WAX UNI: CPT | Performed by: PHYSICIAN ASSISTANT

## 2024-07-17 PROCEDURE — 80061 LIPID PANEL: CPT | Performed by: PHYSICIAN ASSISTANT

## 2024-07-17 ASSESSMENT — PAIN SCALES - GENERAL: PAINLEVEL: NO PAIN (0)

## 2024-07-17 NOTE — PATIENT INSTRUCTIONS
Patient Education   Preventive Care Advice   This is general advice given by our system to help you stay healthy. However, your care team may have specific advice just for you. Please talk to your care team about your preventive care needs.  Nutrition  Eat 5 or more servings of fruits and vegetables each day.  Try wheat bread, brown rice and whole grain pasta (instead of white bread, rice, and pasta).  Get enough calcium and vitamin D. Check the label on foods and aim for 100% of the RDA (recommended daily allowance).  Lifestyle  Exercise at least 150 minutes each week  (30 minutes a day, 5 days a week).  Do muscle strengthening activities 2 days a week. These help control your weight and prevent disease.  No smoking.  Wear sunscreen to prevent skin cancer.  Have a dental exam and cleaning every 6 months.  Yearly exams  See your health care team every year to talk about:  Any changes in your health.  Any medicines your care team has prescribed.  Preventive care, family planning, and ways to prevent chronic diseases.  Shots (vaccines)   HPV shots (up to age 26), if you've never had them before.  Hepatitis B shots (up to age 59), if you've never had them before.  COVID-19 shot: Get this shot when it's due.  Flu shot: Get a flu shot every year.  Tetanus shot: Get a tetanus shot every 10 years.  Pneumococcal, hepatitis A, and RSV shots: Ask your care team if you need these based on your risk.  Shingles shot (for age 50 and up)  General health tests  Diabetes screening:  Starting at age 35, Get screened for diabetes at least every 3 years.  If you are younger than age 35, ask your care team if you should be screened for diabetes.  Cholesterol test: At age 39, start having a cholesterol test every 5 years, or more often if advised.  Bone density scan (DEXA): At age 50, ask your care team if you should have this scan for osteoporosis (brittle bones).  Hepatitis C: Get tested at least once in your life.  STIs (sexually  transmitted infections)  Before age 24: Ask your care team if you should be screened for STIs.  After age 24: Get screened for STIs if you're at risk. You are at risk for STIs (including HIV) if:  You are sexually active with more than one person.  You don't use condoms every time.  You or a partner was diagnosed with a sexually transmitted infection.  If you are at risk for HIV, ask about PrEP medicine to prevent HIV.  Get tested for HIV at least once in your life, whether you are at risk for HIV or not.  Cancer screening tests  Cervical cancer screening: If you have a cervix, begin getting regular cervical cancer screening tests starting at age 21.  Breast cancer scan (mammogram): If you've ever had breasts, begin having regular mammograms starting at age 40. This is a scan to check for breast cancer.  Colon cancer screening: It is important to start screening for colon cancer at age 45.  Have a colonoscopy test every 10 years (or more often if you're at risk) Or, ask your provider about stool tests like a FIT test every year or Cologuard test every 3 years.  To learn more about your testing options, visit:   .  For help making a decision, visit:   https://bit.ly/fo33827.  Prostate cancer screening test: If you have a prostate, ask your care team if a prostate cancer screening test (PSA) at age 55 is right for you.  Lung cancer screening: If you are a current or former smoker ages 50 to 80, ask your care team if ongoing lung cancer screenings are right for you.  For informational purposes only. Not to replace the advice of your health care provider. Copyright   2023 [employer]. All rights reserved. Clinically reviewed by the [facility]. Cirqle.nl [id number] - REV 01/24.

## 2024-07-17 NOTE — PROGRESS NOTES
"Preventive Care Visit  [facility]  Sienna Polanco PA-C, Family Medicine  Jul 17, 2024      Assessment & Plan     Routine general medical examination at a health care facility  - Discussed recommended vaccinations.     Cervical cancer screening  Updated.   - Pap Screen with HPV - Recommended Age 30 - 65 Years    Screening for diabetes mellitus  Screening.   - Hemoglobin A1c; Future  - Glucose; Future  - Hemoglobin A1c  - Glucose    Class 1 obesity without serious comorbidity with body mass index (BMI) of 30.0 to 30.9 in adult, unspecified obesity type  We will check some labs. Discussed that hormone labs like estrogen and progesterone are not likely going to be helpful since has no other symptoms of pituitary concern or absence of cycles other than from her IUD which she has still had some cycles on since having placed in 2022.  We will check cortisol and insulin levels and thyroid to see if there are an concerns.  She may want to evaluate her nutrition further and continue with strength training.    - Insulin level; Future  - TSH; Future  - T4, free; Future  - T3, total; Future  - Cortisol; Future  - Insulin level  - TSH  - T4, free  - T3, total  - Cortisol    Screening for hyperlipidemia  Screening.   - Lipid panel reflex to direct LDL Fasting; Future  - Lipid panel reflex to direct LDL Fasting    Bilateral impacted cerumen  Removed, ok to use debrox drops or olive oil monthly if needed to keep wax soft and hopefully it will come out on it's own.   - RI REMOVAL IMPACTED CERUMEN IRRIGATION/LVG UNILAT        BMI  Estimated body mass index is 30.46 kg/m  as calculated from the following:    Height as of this encounter: 1.662 m (5' 5.43\").    Weight as of this encounter: 84.1 kg (185 lb 8 oz).   Weight management plan: Discussed healthy diet and exercise guidelines    Counseling  Appropriate preventive services were addressed with this patient via screening, questionnaire, or discussion as " appropriate for fall prevention, nutrition, physical activity, Tobacco-use cessation, weight loss and cognition.  Checklist reviewing preventive services available has been given to the patient.  Reviewed patient's diet, addressing concerns and/or questions.   She is at risk for psychosocial distress and has been provided with information to reduce risk.           Huber Buchanan is a 34 year old, presenting for the following:  Wellness Visit        7/17/2024     9:01 AM   Additional Questions   Roomed by gayatri frey   Accompanied by self        Health Care Directive  Patient does not have a Health Care Directive or Living Will: Discussed advance care planning with patient; information given to patient to review.    HPI   Patient would like to discuss plugged ears, had URI about a month ago, plugged ever since.    Discuss labs for hormones, diabetes - weight concerns.   Has been working on avoiding processed foods and excessive sugars. She is working out 4 days per week cardio and strength. She is sleeping pretty well. 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  She feels like she is struggling to lose weight. She is at a weight she feels like is where she ended when she was pregnant last. She notes she had some evaluation in the past for the hirsuitism, they thought maybe potential for PCOS but no confirmed diagnosis, she thinks she might have had high testosterone levels in the past.       7/16/2024   General Health   How would you rate your overall physical health? Good   Feel stress (tense, anxious, or unable to sleep) Only a little      (!) STRESS CONCERN      7/16/2024   Nutrition   Three or more servings of calcium each day? Yes   Diet: Regular (no restrictions)   How many servings of fruit and vegetables per day? (!) 2-3   How many sweetened beverages each day? 0-1            7/16/2024   Exercise   Days per week of moderate/strenous exercise 4 days   Average minutes spent exercising at this level  30 min            2024   Social Factors   Frequency of gathering with friends or relatives Once a week   Worry food won't last until get money to buy more No   Food not last or not have enough money for food? No   Do you have housing? (Housing is defined as stable permanent housing and does not include staying ouside in a car, in a tent, in an abandoned building, in an overnight shelter, or couch-surfing.) Yes   Are you worried about losing your housing? No   Lack of transportation? No   Unable to get utilities (heat,electricity)? No            2024   Dental   Dentist two times every year? Yes               Today's PHQ-2 Score:       2024     9:27 AM   PHQ-2 (  [employer])   Q1: Little interest or pleasure in doing things 0   Q2: Feeling down, depressed or hopeless 0   PHQ-2 Score 0   Q1: Little interest or pleasure in doing things Not at all   Q2: Feeling down, depressed or hopeless Not at all   PHQ-2 Score 0           2024   Substance Use   Alcohol more than 3/day or more than 7/wk No   Do you use any other substances recreationally? No        Social History     Tobacco Use    Smoking status: Former     Current packs/day: 0.00     Types: Cigarettes     Start date: 2010     Quit date: 2011     Years since quittin.2    Smokeless tobacco: Never   Vaping Use    Vaping status: Never Used   Substance Use Topics    Alcohol use: Yes     Comment: Occasional    Drug use: Not Currently          Mammogram Screening - Patient under 40 years of age: Routine Mammogram Screening not recommended.         2024   STI Screening   New sexual partner(s) since last STI/HIV test? No        History of abnormal Pap smear: No - age 30- 64 PAP with HPV every 5 years recommended        2021     8:39 AM 12/3/2018     1:08 PM   PAP / HPV   PAP (Historical) NIL  NIL            2024   Contraception/Family Planning   Questions about contraception or family planning No           Reviewed and updated  as needed this visit by Provider                    Past Medical History:   Diagnosis Date    Varicella      Past Surgical History:   Procedure Laterality Date    AS REPAIR CRUCIATE LIGAMENT,KNEE      CERCLAGE CERVICAL N/A 2020    Procedure: CERCLAGE, CERVIX, VAGINAL APPROACH;  Surgeon: Ronny Rosario MD;  Location: [facility]    CERCLAGE CERVICAL N/A 2022    Procedure: CERCLAGE, CERVIX, VAGINAL APPROACH;  Surgeon: Pricilla Ponce MD;  Location: [facility] L[facility]    ELBOW SURGERY      ORTHOPEDIC SURGERY  2010    Left ACL repair, Left arm      OB History    Para Term  AB Living   2 2 2 0 0 2   SAB IAB Ectopic Multiple Live Births   0 0 0 0 2      # Outcome Date GA Lbr Simone/2nd Weight Sex Type Anes PTL Lv   2 Term 10/06/22 39w1d 01:40 / 00:35 3.09 kg (6 lb 13 oz) M Vag-Spont EPI N GLORIA      Name: BHARATIMALE-NAOMI      Apgar1: 9  Apgar5: 9   1 Term 20 37w0d 06:45 / 00:17 2.87 kg (6 lb 5.2 oz) F Vag-Spont EPI N GLORIA      Name: BHARATIFEMALE-NAOMI      Apgar1: 9  Apgar5: 9     BP Readings from Last 3 Encounters:   24 104/80   23 122/80   23 102/72    Wt Readings from Last 3 Encounters:   24 84.1 kg (185 lb 8 oz)   23 77.6 kg (171 lb)   22 83 kg (183 lb)                  Patient Active Problem List   Diagnosis    Short cervical length during pregnancy    Suspected shortening of cervix not found    History of cervical cerclage    History of  delivery     Past Surgical History:   Procedure Laterality Date    AS REPAIR CRUCIATE LIGAMENT,KNEE      CERCLAGE CERVICAL N/A 2020    Procedure: CERCLAGE, CERVIX, VAGINAL APPROACH;  Surgeon: Ronny Rosario MD;  [facility]    CERCLAGE CERVICAL N/A 2022    Procedure: CERCLAGE, CERVIX, VAGINAL APPROACH;  Surgeon: Pricilla Ponce MD;  [facility]    ELBOW SURGERY      ORTHOPEDIC SURGERY  2010    Left ACL repair, Left arm        Social History     Tobacco Use     "Smoking status: Former     Current packs/day: 0.00     Types: Cigarettes     Start date: 2010     Quit date: 2011     Years since quittin.2    Smokeless tobacco: Never   Substance Use Topics    Alcohol use: Yes     Comment: Occasional     Family History   Problem Relation Age of Onset    Depression Mother     Anxiety Disorder Mother     Obesity Mother         Hx of gastric bypass    Asthma Father     Rheumatoid Arthritis Father     Obesity Father     Depression Brother     Anxiety Disorder Brother     Hyperlipidemia Brother     Diabetes Maternal Grandmother     Diabetes Maternal Grandfather     Heart Disease Maternal Grandfather     Diabetes Paternal Grandmother     Rheumatoid Arthritis Paternal Grandfather     Other Cancer Paternal Grandfather         Pancreatic    Cancer Other         Paternal Grandfather    Glaucoma No family hx of     Macular Degeneration No family hx of          Current Outpatient Medications   Medication Sig Dispense Refill    levonorgestrel (MIRENA) 20 MCG/DAY IUD 1 each (20 mcg) by Intrauterine route once       No Known Allergies      Review of Systems  Constitutional, HEENT, cardiovascular, pulmonary, GI, , musculoskeletal, neuro, skin, endocrine and psych systems are negative, except as otherwise noted.     Objective    Exam  /80   Pulse 70   Temp 98.4  F (36.9  C) (Temporal)   Resp 17   Ht 1.662 m (5' 5.43\")   Wt 84.1 kg (185 lb 8 oz)   LMP 2024   SpO2 98%   BMI 30.46 kg/m     Estimated body mass index is 30.46 kg/m  as calculated from the following:    Height as of this encounter: 1.662 m (5' 5.43\").    Weight as of this encounter: 84.1 kg (185 lb 8 oz).    Physical Exam  GENERAL: alert and no distress  EYES: Eyes grossly normal to inspection, PERRL and conjunctivae and sclerae normal  HENT: Cerumen impaction on right, cerumen in the canal on the left, both removed with ear lavage and curette by provider without complication. and TM's normal, nose and " mouth without ulcers or lesions  NECK: no adenopathy, no asymmetry, masses, or scars  RESP: lungs clear to auscultation - no rales, rhonchi or wheezes  BREAST: normal without masses, tenderness or nipple discharge and no palpable axillary masses or adenopathy  CV: regular rate and rhythm, normal S1 S2, no S3 or S4, no murmur, click or rub, no peripheral edema  ABDOMEN: soft, nontender, no hepatosplenomegaly, no masses and bowel sounds normal   (female) w/bimanual: normal female external genitalia, normal urethral meatus, normal vaginal mucosa, and normal cervix/adnexa/uterus without masses or discharge  MS: no gross musculoskeletal defects noted, no edema  SKIN: no suspicious lesions or rashes  NEURO: Normal strength and tone, mentation intact and speech normal  PSYCH: mentation appears normal, affect normal/bright        Signed Electronically by: Sienna Polanco PA-C

## 2024-07-18 LAB
HPV HR 12 DNA CVX QL NAA+PROBE: NEGATIVE
HPV16 DNA CVX QL NAA+PROBE: NEGATIVE
HPV18 DNA CVX QL NAA+PROBE: NEGATIVE
HUMAN PAPILLOMA VIRUS FINAL DIAGNOSIS: NORMAL

## 2024-07-22 LAB
BKR LAB AP GYN ADEQUACY: NORMAL
BKR LAB AP GYN INTERPRETATION: NORMAL
BKR LAB AP LMP: NORMAL
BKR LAB AP PREVIOUS ABNORMAL: NORMAL
PATH REPORT.COMMENTS IMP SPEC: NORMAL
PATH REPORT.COMMENTS IMP SPEC: NORMAL
PATH REPORT.RELEVANT HX SPEC: NORMAL

## 2024-08-27 ENCOUNTER — OFFICE VISIT (OUTPATIENT)
Dept: FAMILY MEDICINE | Facility: OTHER | Age: 34
End: 2024-08-27
Payer: COMMERCIAL

## 2024-08-27 VITALS
HEART RATE: 71 BPM | HEIGHT: 65 IN | DIASTOLIC BLOOD PRESSURE: 70 MMHG | WEIGHT: 187 LBS | BODY MASS INDEX: 31.16 KG/M2 | SYSTOLIC BLOOD PRESSURE: 98 MMHG | OXYGEN SATURATION: 98 % | RESPIRATION RATE: 18 BRPM | TEMPERATURE: 97.6 F

## 2024-08-27 DIAGNOSIS — Z30.432 ENCOUNTER FOR REMOVAL OF INTRAUTERINE CONTRACEPTIVE DEVICE: Primary | ICD-10-CM

## 2024-08-27 PROCEDURE — 58301 REMOVE INTRAUTERINE DEVICE: CPT | Performed by: FAMILY MEDICINE

## 2024-08-27 ASSESSMENT — PAIN SCALES - GENERAL: PAINLEVEL: NO PAIN (0)

## 2024-08-27 NOTE — PROGRESS NOTES
IUD Removal:  SUBJECTIVE:    Is a pregnancy test required: No.  Was a consent obtained?  Yes    Chanel Vázquez is a 34 year old female,, Patient's last menstrual period was 2024. who presents today for IUD removal. Her current IUD was placed 22 ago. She has not had problems with the IUD. She requests removal of the IUD because she desires to conceive    Today's PHQ-2 Score:       2024     9:27 AM   PHQ-2 (  [employer])   Q1: Little interest or pleasure in doing things 0   Q2: Feeling down, depressed or hopeless 0   PHQ-2 Score 0   Q1: Little interest or pleasure in doing things Not at all   Q2: Feeling down, depressed or hopeless Not at all   PHQ-2 Score 0       PROCEDURE:    A speculum exam was performed and the cervix was visualized. The IUD string was visualized. Using ring forceps, the string  was grasped and the IUD removed intact.    POST PROCEDURE:    The patient tolerated the procedure well. Patient was discharged in stable condition.    Call if bleeding, pain or fever occur. and Pregnancy counseling given, including folic acid supplementation 800-1000 mg per day.    Ashly Wilkerson MD

## 2024-10-24 NOTE — PROGRESS NOTES
38w0d  Cerclages removed last week.  Since then, patient reports she's been doing well.  No contractions.  Is having more discharge, but no leakage of fluid.  +FM  Cervix 4cm today.  Discussed option of eIOL after 39w.  After some conversation about this, she would like to schedule IOL when I'm on call at 39w1d.  Has appointment next week.  Will need COVID swab at that time.  RTC weekly until delivery.  Brenda Greer MD    
normal...

## 2024-12-08 ENCOUNTER — E-VISIT (OUTPATIENT)
Dept: URGENT CARE | Facility: CLINIC | Age: 34
End: 2024-12-08
Payer: COMMERCIAL

## 2024-12-08 DIAGNOSIS — R21 RASH: Primary | ICD-10-CM

## 2024-12-08 PROCEDURE — 99207 PR NON-BILLABLE SERV PER CHARTING: CPT | Performed by: PHYSICIAN ASSISTANT

## 2024-12-08 NOTE — PATIENT INSTRUCTIONS
Dear Chanel Vázquez,    We are sorry you are not feeling well. Based on the responses you provided, it is recommended that you be seen in-person in urgent care so we can better evaluate your symptoms. Please click here to find the nearest urgent care location to you.   You will not be charged for this Visit. Thank you for trusting us with your care.    Darling Novak PA-C

## 2025-05-06 ENCOUNTER — OFFICE VISIT (OUTPATIENT)
Dept: OPTOMETRY | Facility: CLINIC | Age: 35
End: 2025-05-06
Payer: COMMERCIAL

## 2025-05-06 DIAGNOSIS — Z01.00 ROUTINE EYE EXAM: Primary | ICD-10-CM

## 2025-05-06 DIAGNOSIS — H52.13 MYOPIA OF BOTH EYES: ICD-10-CM

## 2025-05-06 DIAGNOSIS — H52.223 REGULAR ASTIGMATISM OF BOTH EYES: ICD-10-CM

## 2025-05-06 PROCEDURE — 92015 DETERMINE REFRACTIVE STATE: CPT | Performed by: OPTOMETRIST

## 2025-05-06 PROCEDURE — 92014 COMPRE OPH EXAM EST PT 1/>: CPT | Performed by: OPTOMETRIST

## 2025-05-06 ASSESSMENT — REFRACTION_MANIFEST
OD_CYLINDER: +0.50
OD_SPHERE: -1.00
OD_AXIS: 155
OD_SPHERE: -0.75
OS_SPHERE: -1.00
OS_CYLINDER: +0.50
METHOD_AUTOREFRACTION: 1
OS_AXIS: 024
OD_CYLINDER: +0.50
OS_CYLINDER: +0.50
OS_AXIS: 030
OD_AXIS: 152
OS_SPHERE: -1.00

## 2025-05-06 ASSESSMENT — KERATOMETRY
OS_K1POWER_DIOPTERS: 41.75
OS_AXISANGLE2_DEGREES: 180
OD_K1POWER_DIOPTERS: 41.75
OD_AXISANGLE2_DEGREES: 180
OD_K2POWER_DIOPTERS: 41.75
OS_K2POWER_DIOPTERS: 41.75

## 2025-05-06 ASSESSMENT — TONOMETRY
IOP_METHOD: APPLANATION
OD_IOP_MMHG: 14
OS_IOP_MMHG: 14

## 2025-05-06 ASSESSMENT — CONF VISUAL FIELD
METHOD: COUNTING FINGERS
OD_SUPERIOR_TEMPORAL_RESTRICTION: 0
OD_SUPERIOR_NASAL_RESTRICTION: 0
OD_NORMAL: 1
OD_INFERIOR_TEMPORAL_RESTRICTION: 0
OS_NORMAL: 1
OS_SUPERIOR_TEMPORAL_RESTRICTION: 0
OS_INFERIOR_NASAL_RESTRICTION: 0
OS_INFERIOR_TEMPORAL_RESTRICTION: 0
OS_SUPERIOR_NASAL_RESTRICTION: 0
OD_INFERIOR_NASAL_RESTRICTION: 0

## 2025-05-06 ASSESSMENT — REFRACTION_WEARINGRX
OD_SPHERE: -1.00
OS_AXIS: 015
OD_CYLINDER: +0.50
OD_AXIS: 160
OS_CYLINDER: +0.75
SPECS_TYPE: SVL
OS_SPHERE: -1.00

## 2025-05-06 ASSESSMENT — EXTERNAL EXAM - RIGHT EYE: OD_EXAM: NORMAL

## 2025-05-06 ASSESSMENT — VISUAL ACUITY
METHOD: SNELLEN - LINEAR
OS_SC+: -1
OD_SC: 20/25
OD_SC+: -1
OS_SC: 20/30
OS_SC: 20/20
OD_SC: 20/20

## 2025-05-06 ASSESSMENT — CUP TO DISC RATIO
OD_RATIO: 0.4
OS_RATIO: 0.3

## 2025-05-06 ASSESSMENT — SLIT LAMP EXAM - LIDS
COMMENTS: NORMAL
COMMENTS: NORMAL

## 2025-05-06 ASSESSMENT — EXTERNAL EXAM - LEFT EYE: OS_EXAM: NORMAL

## 2025-05-06 NOTE — PATIENT INSTRUCTIONS
Discussed contact lenses options. Patient decided not to pursue them  Fill glasses prescription  Allow 2 weeks to adapt to change in glasses  Return in 1 year for eye exam    Sherly Childs O.D.       [facility]  [address] [place] [address] [phone]

## 2025-05-06 NOTE — LETTER
5/6/2025      Chanel Vázquez  [address] [place] [address]      Dear Colleague,    Thank you for referring your patient, Chanel Vázquez, to the [facility]. Please see a copy of my visit note below.    Chief Complaint   Patient presents with     COMPREHENSIVE EYE EXAM     Contact Lens Fitting       Decided no contact lenses after discussion of contact lenses       Allergies: Seasonal:  Spring    Environmental:  pollen and dust  Eye Comfort:  good  Motivation:  avoid glasses and wonders if she'd wear the contacts more   Swimming:   No  Visual Demands:  distance    Last Eye Exam: 6/4/24  Dilated Previously: Yes    What are you currently using to see?  Patient has glasses, doesn't wear them as often as she should. Needs correction for distance, and thinks that she might wear contacts more often. Questions about how it would work while she's o the computer, as she does not wear the glasses for that task. She removes the glasses     Wears older glasses because new ones bother her - side vision is distorted        Distance Vision Acuity: Satisfied with vision, more aware that her vision isn't as good as it used to be    Near Vision Acuity: Satisfied with vision while reading and using computer unaided    Eye Comfort: good  Do you use eye drops? 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    Patient Ocular History:   No history of eye surgery  No family history of glaucoma or macular degeneration       OBJECTIVE: See Ophthalmology exam    ASSESSMENT:    ICD-10-CM    1. Routine eye exam  Z01.00       2. Myopia of both eyes  H52.13       3. Regular astigmatism of both eyes  H52.223           PLAN:     Patient Instructions   Discussed contact lenses options. Patient decided not to pursue them  Fill glasses prescription  Allow 2 weeks to adapt  to change in glasses  Return in 1 year for eye exam    Sherly Childs O.D.       [facility]  [address] [place], [place] [id number] [phone]        Again, thank you for allowing me to participate in the care of your patient.        Sincerely,        Sherly Childs, OD    Electronically signed

## 2025-05-06 NOTE — PROGRESS NOTES
Chief Complaint   Patient presents with    COMPREHENSIVE EYE EXAM    Contact Lens Fitting       Decided no contact lenses after discussion of contact lenses       Allergies: Seasonal:  Spring    Environmental:  pollen and dust  Eye Comfort:  good  Motivation:  avoid glasses and wonders if she'd wear the contacts more   Swimming:   No  Visual Demands:  distance    Last Eye Exam: 6/4/24  Dilated Previously: Yes    What are you currently using to see?  Patient has glasses, doesn't wear them as often as she should. Needs correction for distance, and thinks that she might wear contacts more often. Questions about how it would work while she's o the computer, as she does not wear the glasses for that task. She removes the glasses     Wears older glasses because new ones bother her - side vision is distorted        Distance Vision Acuity: Satisfied with vision, more aware that her vision isn't as good as it used to be    Near Vision Acuity: Satisfied with vision while reading and using computer unaided    Eye Comfort: good  Do you use eye drops? 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    Patient Ocular History:   No history of eye surgery  No family history of glaucoma or macular degeneration       OBJECTIVE: See Ophthalmology exam    ASSESSMENT:    ICD-10-CM    1. Routine eye exam  Z01.00       2. Myopia of both eyes  H52.13       3. Regular astigmatism of both eyes  H52.223           PLAN:     Patient Instructions   Discussed contact lenses options. Patient decided not to pursue them  Fill glasses prescription  Allow 2 weeks to adapt to change in glasses  Return in 1 year for eye exam    Sherly Childs O.D.       [facility]  [address] [place] [address] [phone]

## 2025-05-12 ENCOUNTER — LAB (OUTPATIENT)
Dept: LAB | Facility: OTHER | Age: 35
End: 2025-05-12
Payer: COMMERCIAL

## 2025-05-12 DIAGNOSIS — L68.0 HIRSUTISM: ICD-10-CM

## 2025-05-12 LAB
ANION GAP SERPL CALCULATED.3IONS-SCNC: 11 MMOL/L (ref 7–15)
BUN SERPL-MCNC: 13.4 MG/DL (ref 6–20)
CALCIUM SERPL-MCNC: 9.6 MG/DL (ref 8.8–10.4)
CHLORIDE SERPL-SCNC: 102 MMOL/L (ref 98–107)
CREAT SERPL-MCNC: 0.94 MG/DL (ref 0.51–0.95)
EGFRCR SERPLBLD CKD-EPI 2021: 82 ML/MIN/1.73M2
GLUCOSE SERPL-MCNC: 102 MG/DL (ref 70–99)
HCO3 SERPL-SCNC: 24 MMOL/L (ref 22–29)
POTASSIUM SERPL-SCNC: 4.1 MMOL/L (ref 3.4–5.3)
SODIUM SERPL-SCNC: 137 MMOL/L (ref 135–145)

## 2025-05-12 PROCEDURE — 36415 COLL VENOUS BLD VENIPUNCTURE: CPT

## 2025-05-12 PROCEDURE — 84146 ASSAY OF PROLACTIN: CPT

## 2025-05-12 PROCEDURE — 80048 BASIC METABOLIC PNL TOTAL CA: CPT

## 2025-05-13 ENCOUNTER — RESULTS FOLLOW-UP (OUTPATIENT)
Dept: FAMILY MEDICINE | Facility: OTHER | Age: 35
End: 2025-05-13

## 2025-05-13 LAB — PROLACTIN SERPL 3RD IS-MCNC: 12 NG/ML (ref 5–23)

## 2025-07-18 SDOH — HEALTH STABILITY: PHYSICAL HEALTH: ON AVERAGE, HOW MANY DAYS PER WEEK DO YOU ENGAGE IN MODERATE TO STRENUOUS EXERCISE (LIKE A BRISK WALK)?: 3 DAYS

## 2025-07-18 SDOH — HEALTH STABILITY: PHYSICAL HEALTH: ON AVERAGE, HOW MANY MINUTES DO YOU ENGAGE IN EXERCISE AT THIS LEVEL?: 40 MIN

## 2025-07-18 ASSESSMENT — SOCIAL DETERMINANTS OF HEALTH (SDOH): HOW OFTEN DO YOU GET TOGETHER WITH FRIENDS OR RELATIVES?: ONCE A WEEK

## 2025-07-23 ENCOUNTER — OFFICE VISIT (OUTPATIENT)
Dept: FAMILY MEDICINE | Facility: OTHER | Age: 35
End: 2025-07-23
Attending: PHYSICIAN ASSISTANT
Payer: COMMERCIAL

## 2025-07-23 VITALS
WEIGHT: 180 LBS | SYSTOLIC BLOOD PRESSURE: 110 MMHG | TEMPERATURE: 97.7 F | RESPIRATION RATE: 16 BRPM | OXYGEN SATURATION: 99 % | HEIGHT: 66 IN | BODY MASS INDEX: 28.93 KG/M2 | HEART RATE: 90 BPM | DIASTOLIC BLOOD PRESSURE: 86 MMHG

## 2025-07-23 DIAGNOSIS — Z00.00 ROUTINE GENERAL MEDICAL EXAMINATION AT A HEALTH CARE FACILITY: Primary | ICD-10-CM

## 2025-07-23 DIAGNOSIS — E66.3 OVERWEIGHT WITH BODY MASS INDEX (BMI) OF 29 TO 29.9 IN ADULT: ICD-10-CM

## 2025-07-23 DIAGNOSIS — R79.89 ELEVATED PROLACTIN LEVEL: ICD-10-CM

## 2025-07-23 DIAGNOSIS — Z13.220 SCREENING FOR HYPERLIPIDEMIA: ICD-10-CM

## 2025-07-23 DIAGNOSIS — L68.0 HIRSUTISM: ICD-10-CM

## 2025-07-23 DIAGNOSIS — R73.09 ELEVATED GLUCOSE: ICD-10-CM

## 2025-07-23 LAB
CHOLEST SERPL-MCNC: 180 MG/DL
EST. AVERAGE GLUCOSE BLD GHB EST-MCNC: 111 MG/DL
FASTING STATUS PATIENT QL REPORTED: YES
FASTING STATUS PATIENT QL REPORTED: YES
GLUCOSE SERPL-MCNC: 99 MG/DL (ref 70–99)
HBA1C MFR BLD: 5.5 % (ref 0–5.6)
HDLC SERPL-MCNC: 53 MG/DL
INSULIN SERPL-ACNC: 13 UU/ML (ref 2.6–24.9)
LDLC SERPL CALC-MCNC: 96 MG/DL
NONHDLC SERPL-MCNC: 127 MG/DL
PROLACTIN SERPL 3RD IS-MCNC: 18 NG/ML (ref 5–23)
TRIGL SERPL-MCNC: 155 MG/DL

## 2025-07-23 PROCEDURE — 83036 HEMOGLOBIN GLYCOSYLATED A1C: CPT | Performed by: PHYSICIAN ASSISTANT

## 2025-07-23 PROCEDURE — G2211 COMPLEX E/M VISIT ADD ON: HCPCS | Performed by: PHYSICIAN ASSISTANT

## 2025-07-23 PROCEDURE — 83525 ASSAY OF INSULIN: CPT | Performed by: PHYSICIAN ASSISTANT

## 2025-07-23 PROCEDURE — 3044F HG A1C LEVEL LT 7.0%: CPT | Performed by: PHYSICIAN ASSISTANT

## 2025-07-23 PROCEDURE — 84146 ASSAY OF PROLACTIN: CPT | Performed by: PHYSICIAN ASSISTANT

## 2025-07-23 PROCEDURE — 82947 ASSAY GLUCOSE BLOOD QUANT: CPT | Performed by: PHYSICIAN ASSISTANT

## 2025-07-23 PROCEDURE — 3074F SYST BP LT 130 MM HG: CPT | Performed by: PHYSICIAN ASSISTANT

## 2025-07-23 PROCEDURE — 1126F AMNT PAIN NOTED NONE PRSNT: CPT | Performed by: PHYSICIAN ASSISTANT

## 2025-07-23 PROCEDURE — 3079F DIAST BP 80-89 MM HG: CPT | Performed by: PHYSICIAN ASSISTANT

## 2025-07-23 PROCEDURE — 99213 OFFICE O/P EST LOW 20 MIN: CPT | Mod: 25 | Performed by: PHYSICIAN ASSISTANT

## 2025-07-23 PROCEDURE — 36415 COLL VENOUS BLD VENIPUNCTURE: CPT | Performed by: PHYSICIAN ASSISTANT

## 2025-07-23 PROCEDURE — 80061 LIPID PANEL: CPT | Performed by: PHYSICIAN ASSISTANT

## 2025-07-23 PROCEDURE — 99395 PREV VISIT EST AGE 18-39: CPT | Performed by: PHYSICIAN ASSISTANT

## 2025-07-23 RX ORDER — SPIRONOLACTONE 50 MG/1
50 TABLET, FILM COATED ORAL 2 TIMES DAILY
Qty: 180 TABLET | Refills: 3 | Status: SHIPPED | OUTPATIENT
Start: 2025-07-23

## 2025-07-23 RX ORDER — TOPIRAMATE 25 MG/1
25 TABLET, FILM COATED ORAL AT BEDTIME
Qty: 90 TABLET | Refills: 0 | Status: SHIPPED | OUTPATIENT
Start: 2025-07-23

## 2025-07-23 ASSESSMENT — PAIN SCALES - GENERAL: PAINLEVEL_OUTOF10: NO PAIN (0)

## 2025-07-23 NOTE — PATIENT INSTRUCTIONS
Patient Education   Preventive Care Advice   This is general advice given by our system to help you stay healthy. However, your care team may have specific advice just for you. Please talk to your care team about your preventive care needs.  Nutrition  Eat 5 or more servings of fruits and vegetables each day.  Try wheat bread, brown rice and whole grain pasta (instead of white bread, rice, and pasta).  Get enough calcium and vitamin D. Check the label on foods and aim for 100% of the RDA (recommended daily allowance).  Lifestyle  Exercise at least 150 minutes each week  (30 minutes a day, 5 days a week).  Do muscle strengthening activities 2 days a week. These help control your weight and prevent disease.  No smoking.  Wear sunscreen to prevent skin cancer.  Have a dental exam and cleaning every 6 months.  Yearly exams  See your health care team every year to talk about:  Any changes in your health.  Any medicines your care team has prescribed.  Preventive care, family planning, and ways to prevent chronic diseases.  Shots (vaccines)   HPV shots (up to age 26), if you've never had them before.  Hepatitis B shots (up to age 59), if you've never had them before.  COVID-19 shot: Get this shot when it's due.  Flu shot: Get a flu shot every year.  Tetanus shot: Get a tetanus shot every 10 years.  Pneumococcal, hepatitis A, and RSV shots: Ask your care team if you need these based on your risk.  Shingles shot (for age 50 and up)  General health tests  Diabetes screening:  Starting at age 35, Get screened for diabetes at least every 3 years.  If you are younger than age 35, ask your care team if you should be screened for diabetes.  Cholesterol test: At age 39, start having a cholesterol test every 5 years, or more often if advised.  Bone density scan (DEXA): At age 50, ask your care team if you should have this scan for osteoporosis (brittle bones).  Hepatitis C: Get tested at least once in your life.  STIs (sexually  transmitted infections)  Before age 24: Ask your care team if you should be screened for STIs.  After age 24: Get screened for STIs if you're at risk. You are at risk for STIs (including HIV) if:  You are sexually active with more than one person.  You don't use condoms every time.  You or a partner was diagnosed with a sexually transmitted infection.  If you are at risk for HIV, ask about PrEP medicine to prevent HIV.  Get tested for HIV at least once in your life, whether you are at risk for HIV or not.  Cancer screening tests  Cervical cancer screening: If you have a cervix, begin getting regular cervical cancer screening tests starting at age 21.  Breast cancer scan (mammogram): If you've ever had breasts, begin having regular mammograms starting at age 40. This is a scan to check for breast cancer.  Colon cancer screening: It is important to start screening for colon cancer at age 45.  Have a colonoscopy test every 10 years (or more often if you're at risk) Or, ask your provider about stool tests like a FIT test every year or Cologuard test every 3 years.  To learn more about your testing options, visit:   .  For help making a decision, visit:   https://bit.ly/fm17800.  Prostate cancer screening test: If you have a prostate, ask your care team if a prostate cancer screening test (PSA) at age 55 is right for you.  Lung cancer screening: If you are a current or former smoker ages 50 to 80, ask your care team if ongoing lung cancer screenings are right for you.  For informational purposes only. Not to replace the advice of your health care provider. Copyright   2023 [employer]. All rights reserved. Clinically reviewed by the [facility]itions Program. [employer] [place] 039110 - REV 01/24.

## 2025-07-23 NOTE — PROGRESS NOTES
"Preventive Care Visit  [facility]  Sienna Polanco PA-C, Family Medicine  Jul 23, 2025      Assessment & Plan     Routine general medical examination at a health care facility    Elevated glucose  - A1c is normal, monitoring and will evaluate overall IR risk.   - Hemoglobin A1c; Future  - Insulin level; Future  - Glucose; Future  - Hemoglobin A1c  - Insulin level  - Glucose    Elevated prolactin level  Rechecking, consider MRI but she has no galactorrhea symptoms.   - Prolactin; Future  - Prolactin    Hirsutism  Has improved with Spironolactone and OCP. No changes to dose.   - spironolactone (ALDACTONE) 50 MG tablet; Take 1 tablet (50 mg) by mouth 2 times daily.    Screening for hyperlipidemia  Screening.   - Lipid panel reflex to direct LDL Fasting; Future  - Lipid panel reflex to direct LDL Fasting    Overweight with body mass index (BMI) of 29 to 29.9 in adult  Struggling with weight loss, has cravings and food noise.   She is on OCP so low risk of pregnancy  She has no contraindications to either medications.   Did review potential side effects and also off label use of individual drugs versus combination drug Qsymia.   Aim to get around 90 grams protein per day and 30 grams of fiber daily.   Monitor sleep.   Follow-up 4 weeks.   - phentermine (LOMAIRA) 8 MG tablet; Take 0.5 tablets (4 mg) by mouth every morning (before breakfast).  - topiramate (TOPAMAX) 25 MG tablet; Take 1 tablet (25 mg) by mouth at bedtime.  - Adult Nutrition  Referral; Future      BMI  Estimated body mass index is 29.28 kg/m  as calculated from the following:    Height as of this encounter: 1.67 m (5' 5.75\").    Weight as of this encounter: 81.6 kg (180 lb).   Weight management plan: Discussed healthy diet and exercise guidelines    Counseling  Appropriate preventive services were addressed with this patient via screening, questionnaire, or discussion as appropriate for fall prevention, nutrition, physical " activity, Tobacco-use cessation, social engagement, weight loss and cognition.  Checklist reviewing preventive services available has been given to the patient.  Reviewed patient's diet, addressing concerns and/or questions.   She is at risk for lack of exercise and has been provided with information to increase physical activity for the benefit of her well-being.   Reviewed preventive health counseling, as reflected in patient instructions    The longitudinal plan of care for the diagnosis(es)/condition(s) as documented were addressed during this visit. Due to the added complexity in care, I will continue to support Chanel in the subsequent management and with ongoing continuity of care.    Subjective   Chanel is a 35 year old, presenting for the following:  Physical        7/23/2025     8:00 AM   Additional Questions   Roomed by BUD Fairchild   Accompanied by Self        HPI     Patient would like to discuss weight management.         Advance Care Planning    Discussed advance care planning with patient; informed AVS has link to [facility].        7/18/2025   General Health   How would you rate your overall physical health? (!) FAIR   Feel stress (tense, anxious, or unable to sleep) Only a little   (!) STRESS CONCERN      7/18/2025   Nutrition   Three or more servings of calcium each day? Yes   Diet: Regular (no restrictions)   How many servings of fruit and vegetables per day? (!) 2-3   How many sweetened beverages each day? 0-1         7/18/2025   Exercise   Days per week of moderate/strenous exercise 3 days   Average minutes spent exercising at this level 40 min         7/18/2025   Social Factors   Frequency of gathering with friends or relatives Once a week   Worry food won't last until get money to buy more No   Food not last or not have enough money for food? No   Do you have housing? (Housing is defined as stable permanent housing and does not include staying outside in a car, in a tent, in an  abandoned building, in an overnight shelter, or couch-surfing.) Yes   Are you worried about losing your housing? No   Lack of transportation? No   Unable to get utilities (heat,electricity)? No         2025   Dental   Dentist two times every year? Yes       Today's PHQ-2 Score:       2025     8:03 AM   PHQ-2 (  [employer])   Q1: Little interest or pleasure in doing things 0   Q2: Feeling down, depressed or hopeless 0   PHQ-2 Score 0         2025   Substance Use   Alcohol more than 3/day or more than 7/wk No   Do you use any other substances recreationally? No     Social History     Tobacco Use    Smoking status: Former     Current packs/day: 0.00     Types: Cigarettes     Start date: 2010     Quit date: 2011     Years since quittin.2    Smokeless tobacco: Never   Vaping Use    Vaping status: Never Used   Substance Use Topics    Alcohol use: Yes     Comment: Occasional    Drug use: Not Currently          Mammogram Screening - Patient under 40 years of age: Routine Mammogram Screening not recommended.         2025   STI Screening   New sexual partner(s) since last STI/HIV test? No     History of abnormal Pap smear: No - age 30- 64 PAP with HPV every 5 years recommended        Latest Ref Rng & Units 2024     9:36 AM 2021     8:39 AM 12/3/2018     1:08 PM   PAP / HPV   PAP  Negative for Intraepithelial Lesion or Malignancy (NILM)      PAP (Historical)   NIL  NIL    HPV 16 DNA Negative Negative      HPV 18 DNA Negative Negative      Other HR HPV Negative Negative              2025   Contraception/Family Planning   Questions about contraception or family planning No   What are your periods like? Regular        Reviewed and updated as needed this visit by Provider                    Past Medical History:   Diagnosis Date    Varicella      Past Surgical History:   Procedure Laterality Date    AS REPAIR CRUCIATE LIGAMENT,KNEE      CERCLAGE CERVICAL N/A 2020     Procedure: CERCLAGE, CERVIX, VAGINAL APPROACH;  Surgeon: Ronny Rosario MD;  Location: [facility]    CERCLAGE CERVICAL N/A 2022    Procedure: CERCLAGE, CERVIX, VAGINAL APPROACH;  Surgeon: Pricilla Ponce MD;  Location: [facility]    ELBOW SURGERY      ORTHOPEDIC SURGERY  2010    Left ACL repair, Left arm      BP Readings from Last 3 Encounters:   25 110/86   25 118/78   24 98/70    Wt Readings from Last 3 Encounters:   25 81.6 kg (180 lb)   25 82.1 kg (181 lb)   24 84.8 kg (187 lb)                  Patient Active Problem List   Diagnosis    Short cervical length during pregnancy    Suspected shortening of cervix not found    History of cervical cerclage    History of  delivery     Past Surgical History:   Procedure Laterality Date    AS REPAIR CRUCIATE LIGAMENT,KNEE      CERCLAGE CERVICAL N/A 2020    Procedure: CERCLAGE, CERVIX, VAGINAL APPROACH;  Surgeon: Ronny Rosario MD;  Location: [facility]    CERCLAGE CERVICAL N/A 2022    Procedure: CERCLAGE, CERVIX, VAGINAL APPROACH;  Surgeon: Pricilla Ponce MD;  Location: [facility]    ELBOW SURGERY      ORTHOPEDIC SURGERY  2010    Left ACL repair, Left arm        Social History     Tobacco Use    Smoking status: Former     Current packs/day: 0.00     Types: Cigarettes     Start date: 2010     Quit date: 2011     Years since quittin.2    Smokeless tobacco: Never   Substance Use Topics    Alcohol use: Yes     Comment: Occasional     Family History   Problem Relation Age of Onset    Eye Surgery Mother         LASIK    Depression Mother     Anxiety Disorder Mother     Obesity Mother         Hx of gastric bypass    Asthma Father     Rheumatoid Arthritis Father     Obesity Father     Diabetes Maternal Grandmother     Diabetes Maternal Grandfather     Heart Disease Maternal Grandfather     Diabetes Paternal Grandmother     Rheumatoid Arthritis Paternal Grandfather     Other Cancer  "Paternal Grandfather         Pancreatic    Depression Brother     Anxiety Disorder Brother     Hyperlipidemia Brother     Cancer Other         Paternal Grandfather    Glaucoma No family hx of     Macular Degeneration No family hx of          Current Outpatient Medications   Medication Sig Dispense Refill    drospirenone-ethinyl estradiol (DOUGLAS) 3-0.02 MG tablet Take 1 tablet by mouth daily. 84 tablet 3    phentermine (LOMAIRA) 8 MG tablet Take 0.5 tablets (4 mg) by mouth every morning (before breakfast). 30 tablet 0    spironolactone (ALDACTONE) 50 MG tablet Take 1 tablet (50 mg) by mouth 2 times daily. 180 tablet 3    topiramate (TOPAMAX) 25 MG tablet Take 1 tablet (25 mg) by mouth at bedtime. 90 tablet 0     No Known Allergies      Review of Systems  Constitutional, HEENT, cardiovascular, pulmonary, GI, , musculoskeletal, neuro, skin, endocrine and psych systems are negative, except as otherwise noted.     Objective    Exam  /86   Pulse 90   Temp 97.7  F (36.5  C) (Temporal)   Resp 16   Ht 1.67 m (5' 5.75\")   Wt 81.6 kg (180 lb)   LMP 07/14/2025 (Approximate)   SpO2 99%   BMI 29.28 kg/m     Estimated body mass index is 29.28 kg/m  as calculated from the following:    Height as of this encounter: 1.67 m (5' 5.75\").    Weight as of this encounter: 81.6 kg (180 lb).    Physical Exam  GENERAL: alert and no distress  EYES: Eyes grossly normal to inspection, PERRL and conjunctivae and sclerae normal  HENT: ear canals and TM's normal, nose and mouth without ulcers or lesions  NECK: no adenopathy, no asymmetry, masses, or scars  RESP: lungs clear to auscultation - no rales, rhonchi or wheezes  BREAST: normal without masses, tenderness or nipple discharge and no palpable axillary masses or adenopathy  CV: regular rate and rhythm, normal S1 S2, no S3 or S4, no murmur, click or rub, no peripheral edema  ABDOMEN: soft, nontender, no hepatosplenomegaly, no masses and bowel sounds normal  MS: no gross " musculoskeletal defects noted, no edema  SKIN: no suspicious lesions or rashes  NEURO: Normal strength and tone, mentation intact and speech normal  PSYCH: mentation appears normal, affect normal/bright        Signed Electronically by: Sienna Polanco PA-C

## 2025-08-14 ENCOUNTER — MYC MEDICAL ADVICE (OUTPATIENT)
Dept: FAMILY MEDICINE | Facility: OTHER | Age: 35
End: 2025-08-14
Payer: COMMERCIAL

## (undated) DEVICE — SOL WATER IRRIG 1000ML BOTTLE 07139-09

## (undated) DEVICE — LUBRICATING JELLY 2.7GM T00137

## (undated) DEVICE — Device

## (undated) DEVICE — SU ETHILON 2 CT-2 30" D-6865

## (undated) DEVICE — PREP SKIN SCRUB TRAY 4461A

## (undated) DEVICE — GLOVE ESTEEM POWDER FREE SMT 7.0  2D72PT70

## (undated) DEVICE — LIGHT HANDLE X2

## (undated) DEVICE — TUBING SUCTION 10'X3/16" N510

## (undated) DEVICE — CATH TRAY FOLEY 16FR BARDEX W/DRAIN BAG STATLOCK 300316A

## (undated) DEVICE — PREP POVIDONE IODINE USP 7.5% CLEANING SOL 64538161

## (undated) DEVICE — DRAPE POUCH IRR 1016

## (undated) DEVICE — NDL COUNTER 20CT 31142493

## (undated) DEVICE — SOL NACL 0.9% IRRIG 1000ML BOTTLE 2F7124

## (undated) DEVICE — SPONGE RAY-TEC 4X8" 7318

## (undated) DEVICE — GLOVE ESTEEM POWDER FREE SMT 6.0  2D72PT60

## (undated) DEVICE — PREP POVIDONE IODINE USP 10% TOPICAL SOL 64537161

## (undated) DEVICE — SYR BULB IRRIG 50ML LATEX FREE 0035280

## (undated) DEVICE — GLOVE PROTEXIS BLUE W/NEU-THERA 6.0  2D73EB60

## (undated) DEVICE — SUCTION TIP YANKAUER W/O VENT K86

## (undated) DEVICE — DRAPE GYN/UROLOGY FLUID POUCH TUR 29455

## (undated) DEVICE — GLOVE PROTEXIS BLUE W/NEU-THERA 7.0  2D73EB70

## (undated) RX ORDER — ONDANSETRON 2 MG/ML
INJECTION INTRAMUSCULAR; INTRAVENOUS
Status: DISPENSED
Start: 2022-04-08

## (undated) RX ORDER — FENTANYL CITRATE-0.9 % NACL/PF 10 MCG/ML
PLASTIC BAG, INJECTION (ML) INTRAVENOUS
Status: DISPENSED
Start: 2022-04-08